# Patient Record
Sex: FEMALE | Race: BLACK OR AFRICAN AMERICAN | Employment: FULL TIME | URBAN - METROPOLITAN AREA
[De-identification: names, ages, dates, MRNs, and addresses within clinical notes are randomized per-mention and may not be internally consistent; named-entity substitution may affect disease eponyms.]

---

## 2017-01-16 ENCOUNTER — OFFICE VISIT (OUTPATIENT)
Dept: FAMILY MEDICINE CLINIC | Age: 32
End: 2017-01-16

## 2017-01-16 VITALS
SYSTOLIC BLOOD PRESSURE: 110 MMHG | BODY MASS INDEX: 30.65 KG/M2 | RESPIRATION RATE: 16 BRPM | DIASTOLIC BLOOD PRESSURE: 69 MMHG | WEIGHT: 202.2 LBS | HEART RATE: 86 BPM | OXYGEN SATURATION: 100 % | TEMPERATURE: 97.6 F | HEIGHT: 68 IN

## 2017-01-16 DIAGNOSIS — F41.8 DEPRESSION WITH ANXIETY: Primary | ICD-10-CM

## 2017-01-16 RX ORDER — VENLAFAXINE HYDROCHLORIDE 37.5 MG/1
37.5 CAPSULE, EXTENDED RELEASE ORAL DAILY
Qty: 30 CAP | Refills: 1 | Status: SHIPPED | OUTPATIENT
Start: 2017-01-16 | End: 2017-02-14 | Stop reason: SDUPTHER

## 2017-01-16 RX ORDER — ESCITALOPRAM OXALATE 10 MG/1
10 TABLET ORAL DAILY
COMMUNITY
Start: 2016-12-06 | End: 2017-01-16 | Stop reason: ALTCHOICE

## 2017-01-16 RX ORDER — AZITHROMYCIN 250 MG/1
TABLET, FILM COATED ORAL
COMMUNITY
Start: 2016-12-06 | End: 2017-01-16 | Stop reason: ALTCHOICE

## 2017-01-16 NOTE — MR AVS SNAPSHOT
Visit Information Date & Time Provider Department Dept. Phone Encounter #  
 1/16/2017  2:55 PM Dai Osullivan NP [facility] [phone] [id number] Follow-up Instructions Return in about 4 weeks (around 2/13/2017) for med f/u. Upcoming Health Maintenance Date Due  
 PAP AKA CERVICAL CYTOLOGY 2/2/2018 DTaP/Tdap/Td series (2 - Td) 1/16/2027 Allergies as of 1/16/2017  Review Complete On: 1/16/2017 By: Dai Osullivan NP No Known Allergies Current Immunizations  Never Reviewed No immunizations on file. Not reviewed this visit You Were Diagnosed With   
  
 Codes Comments Depression with anxiety    -  Primary ICD-10-CM: F41.8 ICD-9-CM: 300.4 Vitals BP Pulse Temp Resp Height(growth percentile) Weight(growth percentile) 110/69 (BP 1 Location: Right arm, BP Patient Position: Sitting) 86 97.6 °F (36.4 °C) (Oral) 16 5' 7.5\" (1.715 m) 202 lb 3.2 oz (91.7 kg) LMP SpO2 BMI Smoking Status 01/03/2017 (Approximate) 100% 31.2 kg/m2 Never Smoker BMI and BSA Data Body Mass Index Body Surface Area  
 31.2 kg/m 2 2.09 m 2 Preferred Pharmacy Pharmacy Name Phone [facility] [address] [place] [address] [phone] Your Updated Medication List  
  
   
This list is accurate as of: 1/16/17  3:43 PM.  Always use your most recent med list.  
  
  
  
  
 venlafaxine-SR 37.5 mg capsule Commonly known as:  EFFEXOR-XR Take 1 Cap by mouth daily. Prescriptions Sent to Pharmacy Refills  
 venlafaxine-SR (EFFEXOR-XR) 37.5 mg capsule 1 Sig: Take 1 Cap by mouth daily. Class: Normal  
 Pharmacy: [facility] [address] [place] [address] Ph #: [phone] Route: Oral  
  
We Performed the Following REFERRAL TO PSYCHOLOGY [JMX13 Custom] Comments:  
 Please evaluate patient for depression/anxiety counseling.  Please refer to [facility]. Follow-up Instructions Return in about 4 weeks (around 2/13/2017) for med f/u. Referral Information Referral ID Referred By Referred To  
  
 [id number] [facility] Not Available Visits Status Start Date End Date 1 New Request 1/16/17 1/16/18 If your referral has a status of pending review or denied, additional information will be sent to support the outcome of this decision. Patient Instructions Recovering From Depression: Care Instructions Your Care Instructions Taking good care of yourself is important as you recover from depression. In time, your symptoms will fade as your treatment takes hold. Do not give up. Instead, focus your energy on getting better. Your mood will improve. It just takes some time. Focus on things that can help you feel better, such as being with friends and family, eating well, and getting enough rest. But take things slowly. Do not do too much too soon. You will begin to feel better gradually. Follow-up care is a key part of your treatment and safety. Be sure to make and go to all appointments, and call your doctor if you are having problems. It's also a good idea to know your test results and keep a list of the medicines you take. How can you care for yourself at home? Be realistic · If you have a large task to do, break it up into smaller steps you can handle, and just do what you can. · You may want to put off important decisions until your depression has lifted. If you have plans that will have a major impact on your life, such as marriage, divorce, or a job change, try to wait a bit. Talk it over with friends and loved ones who can help you look at the overall picture first. 
· Reaching out to people for help is important. Do not isolate yourself. Let your family and friends help you. Find someone you can trust and confide in, and talk to that person. · Be patient, and be kind to yourself. Remember that depression is not your fault and is not something you can overcome with willpower alone. Treatment is necessary for depression, just like for any other illness. Feeling better takes time, and your mood will improve little by little. Stay active · Stay busy and get outside. Take a walk, or try some other light exercise. · Talk with your doctor about an exercise program. Exercise can help with mild depression. · Go to a movie or concert. Take part in a Taoism activity or other social gathering. Go to a [employer] game. · Ask a friend to have dinner with you. Take care of yourself · Eat a balanced diet with plenty of fresh fruits and vegetables, whole grains, and lean protein. If you have lost your appetite, eat small snacks rather than large meals. · Avoid drinking alcohol or using illegal drugs. Do not take medicines that have not been prescribed for you. They may interfere with medicines you may be taking for depression, or they may make your depression worse. · Take your medicines exactly as they are prescribed. You may start to feel better within 1 to 3 weeks of taking antidepressant medicine. But it can take as many as 6 to 8 weeks to see more improvement. If you have questions or concerns about your medicines, or if you do not notice any improvement by 3 weeks, talk to your doctor. · If you have any side effects from your medicine, tell your doctor. Antidepressants can make you feel tired, dizzy, or nervous. Some people have dry mouth, constipation, headaches, sexual problems, or diarrhea. Many of these side effects are mild and will go away on their own after you have been taking the medicine for a few weeks. Some may last longer. Talk to your doctor if side effects are bothering you too much. You might be able to try a different medicine. · Get enough sleep. If you have problems sleeping: ¨ Go to bed at the same time every night, and get up at the same time every morning. ¨ Keep your bedroom dark and quiet. ¨ Do not exercise after 5:00 p.m. ¨ Avoid drinks with caffeine after 5:00 p.m. · Avoid sleeping pills unless they are prescribed by the doctor treating your depression. Sleeping pills may make you groggy during the day, and they may interact with other medicine you are taking. · If you have any other illnesses, such as diabetes, heart disease, or high blood pressure, make sure to continue with your treatment. Tell your doctor about all of the medicines you take, including those with or without a prescription. · Keep the numbers for these national suicide hotlines: 0-142-800-TALK (0-874.543.3316) and 1-319-JAETWTT (7-278.418.6580). If you or someone you know talks about suicide or feeling hopeless, get help right away. When should you call for help? Call 911 anytime you think you may need emergency care. For example, call if: 
· You feel like hurting yourself or someone else. · Someone you know has depression and is about to attempt or is attempting suicide. Call your doctor now or seek immediate medical care if: 
· You hear voices. · Someone you know has depression and: 
¨ Starts to give away his or her possessions. ¨ Uses illegal drugs or drinks alcohol heavily. ¨ Talks or writes about death, including writing suicide notes or talking about guns, knives, or pills. ¨ Starts to spend a lot of time alone. ¨ Acts very aggressively or suddenly appears calm. Watch closely for changes in your health, and be sure to contact your doctor if: 
· You do not get better as expected. Where can you learn more? Go to http://mitch-radha.info/. Enter F228 in the search box to learn more about \"Recovering From Depression: Care Instructions. \" Current as of: July 26, 2016 Content Version: 11.1 © 7993-8196 [employer].  Care instructions adapted under license by [address] (which disclaims liability or warranty for this information). If you have questions about a medical condition or this instruction, always ask your healthcare professional. [address] any warranty or liability for your use of this information. Introducing \[facility]\"" & HEALTH SERVICES! Raheem Harrell introduces Antengo patient portal. Now you can access parts of your medical record, email your doctor's office, and request medication refills online. 1. In your internet browser, go to [email or web address] 2. Click on the First Time User? Click Here link in the Sign In box. You will see the New Member Sign Up page. 3. Enter your Antengo Access Code exactly as it appears below. You will not need to use this code after youve completed the sign-up process. If you do not sign up before the expiration date, you must request a new code. · Antengo Access Code: [email or web address] Expires: 4/16/2017  3:11 PM 
 
4. Enter the last four digits of your Social Security Number (xxxx) and Date of Birth (mm/dd/yyyy) as indicated and click Submit. You will be taken to the next sign-up page. 5. Create a Antengo ID. This will be your Antengo login ID and cannot be changed, so think of one that is secure and easy to remember. 6. Create a Antengo password. You can change your password at any time. 7. Enter your Password Reset Question and Answer. This can be used at a later time if you forget your password. 8. Enter your e-mail address. You will receive e-mail notification when new information is available in [address] 9. Click Sign Up. You can now view and download portions of your medical record. 10. Click the Download Summary menu link to download a portable copy of your medical information. If you have questions, please visit the Frequently Asked Questions section of the Antengo website.  Remember, Antengo is NOT to be used for urgent needs. For medical emergencies, dial 911. Now available from your iPhone and Android! Please provide this summary of care documentation to your next provider. Your primary care clinician is listed as Herrera Arita. If you have any questions after today's visit, please call [phone].

## 2017-01-16 NOTE — PROGRESS NOTES
HISTORY OF PRESENT ILLNESS  Rigoberto Varela is a 33 y.o. female. HPI  The patient presents today to establish care. Previous PCP was n/a. She is a  at [employer]. Medical Problems:  1. Denies    Current Specialists:  1. Guadalupe Mosher - OB/GYN at [facility] (at [facility])    Acute complaints today:  1. Anxiety/Depression - Thinks that her symptoms have gotten worse over the past year. Has had symptoms since her kids were born; she split with her  shortly after that. She moved, changed job, her dad is legally blind and has moved in. Her stepkids also come frequently. Work is also very stressful, as it has grown a lot. She has financial stressorss as well. She feels very overwhelmed. She is sleeping a lot. Has a short temper  Takes lexapro 10mg once daily, started by her OB/GYN March 2016. Feels like it was helping somewhat, but not enough. Some days are better than others. Preventative Care  Flu shot: already got this season  TDaP: declines  Pap smear: February 2015 with OB/GYN; normal  Denies any family hx of breast or colon CA. Healthy Habits  Patient is exercising 0x per week. Patient endorses unhealthy diet; avoids fatty/greasy foods, sugar-sweetened beverages. Eats a lot of take-out foot at work. Denies tobacco use. Social alcohol use. Denies illicit drug use. Sexually active with 1 male partner in last 12 months.; no concern for STIs today. History reviewed. No pertinent past medical history.   Past Surgical History   Procedure Laterality Date    Hx gyn  2011     Family History   Problem Relation Age of Onset    Hypertension Mother    [facility] Anxiety Mother     Neuropathy Mother     Elevated Lipids Mother     COPD Father    [facility] Glaucoma Father    [facility] Depression Father     Hypertension Sister     No Known Problems Sister     Lupus Sister     Thyroid Disease Sister      Social History     Social History    Marital status: SINGLE     Spouse name: N/A    Number of children: N/A    Years of education: N/A     Social History Main Topics    Smoking status: Never Smoker    Smokeless tobacco: Never Used    Alcohol use Yes      Comment: occasional    Drug use: No    Sexual activity: Yes     Partners: Male     Other Topics Concern    None     Social History Narrative    None     Patient Active Problem List   Diagnosis Code    Depression with anxiety F41.8     Outpatient Encounter Prescriptions as of 1/16/2017   Medication Sig Dispense Refill    venlafaxine-SR (EFFEXOR-XR) 37.5 mg capsule Take 1 Cap by mouth daily. 30 Cap 1    [DISCONTINUED] azithromycin (ZITHROMAX) 250 mg tablet       [DISCONTINUED] escitalopram oxalate (LEXAPRO) 10 mg tablet 10 mg daily. No facility-administered encounter medications on file as of 1/16/2017. Visit Vitals    /69 (BP 1 Location: Right arm, BP Patient Position: Sitting)    Pulse 86    Temp 97.6 °F (36.4 °C) (Oral)    Resp 16    Ht 5' 7.5\" (1.715 m)    Wt 202 lb 3.2 oz (91.7 kg)    LMP 01/03/2017 (Approximate)    SpO2 100%    BMI 31.2 kg/m2         Review of Systems   Constitutional: Negative for chills and fever. Respiratory: Negative for cough and shortness of breath. Cardiovascular: Negative for chest pain and palpitations. Psychiatric/Behavioral: Positive for depression. Negative for hallucinations, substance abuse and suicidal ideas. The patient is nervous/anxious. The patient does not have insomnia. Physical Exam   Constitutional: She is oriented to person, place, and time. She appears well-developed and well-nourished. No distress. Visibly anxious; tearful   Cardiovascular: Normal rate, normal heart sounds and intact distal pulses. Pulmonary/Chest: Effort normal and breath sounds normal. No respiratory distress. She has no wheezes. Neurological: She is alert and oriented to person, place, and time. Skin: Skin is warm and dry. She is not diaphoretic. Psychiatric: She has a normal mood and affect. Her behavior is normal. Judgment normal.   Nursing note and vitals reviewed. ASSESSMENT and PLAN    ICD-10-CM ICD-9-CM    1. Depression with anxiety F41.8 300.4 venlafaxine-SR (EFFEXOR-XR) 37.5 mg capsule      REFERRAL TO PSYCHOLOGY     1. Establish care  The patient's medications, allergies, and history were all updated today. 2. Depression with Anxiety  PHQ-9 score of 16 today indicates moderate depression; reinforced with history today. Discussed at length. Will switch from lexapro to effexor 37.5mg. Will also refer to counseling. Also discussed importance of social support, exercise, and having a feeling of purpose in management of depression/anxiety. Discussed possible SEs of new medication, and to seek care immediately if symptoms worsen or if SI develops. Return in 4 weeks for medication follow-up. Will plan for labs and a physical after depression/anxiety is better controlled. Follow-up Disposition:  Return in about 4 weeks (around 2/13/2017) for med f/u.

## 2017-01-16 NOTE — PATIENT INSTRUCTIONS
Recovering From Depression: Care Instructions  Your Care Instructions  Taking good care of yourself is important as you recover from depression. In time, your symptoms will fade as your treatment takes hold. Do not give up. Instead, focus your energy on getting better. Your mood will improve. It just takes some time. Focus on things that can help you feel better, such as being with friends and family, eating well, and getting enough rest. But take things slowly. Do not do too much too soon. You will begin to feel better gradually. Follow-up care is a key part of your treatment and safety. Be sure to make and go to all appointments, and call your doctor if you are having problems. It's also a good idea to know your test results and keep a list of the medicines you take. How can you care for yourself at home? Be realistic  · If you have a large task to do, break it up into smaller steps you can handle, and just do what you can. · You may want to put off important decisions until your depression has lifted. If you have plans that will have a major impact on your life, such as marriage, divorce, or a job change, try to wait a bit. Talk it over with friends and loved ones who can help you look at the overall picture first.  · Reaching out to people for help is important. Do not isolate yourself. Let your family and friends help you. Find someone you can trust and confide in, and talk to that person. · Be patient, and be kind to yourself. Remember that depression is not your fault and is not something you can overcome with willpower alone. Treatment is necessary for depression, just like for any other illness. Feeling better takes time, and your mood will improve little by little. Stay active  · Stay busy and get outside. Take a walk, or try some other light exercise. · Talk with your doctor about an exercise program. Exercise can help with mild depression. · Go to a movie or concert.  Take part in a Religious activity or other social gathering. Go to a [employer] game. · Ask a friend to have dinner with you. Take care of yourself  · Eat a balanced diet with plenty of fresh fruits and vegetables, whole grains, and lean protein. If you have lost your appetite, eat small snacks rather than large meals. · Avoid drinking alcohol or using illegal drugs. Do not take medicines that have not been prescribed for you. They may interfere with medicines you may be taking for depression, or they may make your depression worse. · Take your medicines exactly as they are prescribed. You may start to feel better within 1 to 3 weeks of taking antidepressant medicine. But it can take as many as 6 to 8 weeks to see more improvement. If you have questions or concerns about your medicines, or if you do not notice any improvement by 3 weeks, talk to your doctor. · If you have any side effects from your medicine, tell your doctor. Antidepressants can make you feel tired, dizzy, or nervous. Some people have dry mouth, constipation, headaches, sexual problems, or diarrhea. Many of these side effects are mild and will go away on their own after you have been taking the medicine for a few weeks. Some may last longer. Talk to your doctor if side effects are bothering you too much. You might be able to try a different medicine. · Get enough sleep. If you have problems sleeping:  ¨ Go to bed at the same time every night, and get up at the same time every morning. ¨ Keep your bedroom dark and quiet. ¨ Do not exercise after 5:00 p.m. ¨ Avoid drinks with caffeine after 5:00 p.m. · Avoid sleeping pills unless they are prescribed by the doctor treating your depression. Sleeping pills may make you groggy during the day, and they may interact with other medicine you are taking. · If you have any other illnesses, such as diabetes, heart disease, or high blood pressure, make sure to continue with your treatment.  Tell your doctor about all of the medicines you take, including those with or without a prescription. · Keep the numbers for these national suicide hotlines: 5-125-592-TALK (0-497.664.8422) and 4-307-YDPQZCX (0-239.224.5999). If you or someone you know talks about suicide or feeling hopeless, get help right away. When should you call for help? Call 911 anytime you think you may need emergency care. For example, call if:  · You feel like hurting yourself or someone else. · Someone you know has depression and is about to attempt or is attempting suicide. Call your doctor now or seek immediate medical care if:  · You hear voices. · Someone you know has depression and:  ¨ Starts to give away his or her possessions. ¨ Uses illegal drugs or drinks alcohol heavily. ¨ Talks or writes about death, including writing suicide notes or talking about guns, knives, or pills. ¨ Starts to spend a lot of time alone. ¨ Acts very aggressively or suddenly appears calm. Watch closely for changes in your health, and be sure to contact your doctor if:  · You do not get better as expected. Where can you learn more? Go to http://mitch-radha.info/. Enter Z146 in the search box to learn more about \"Recovering From Depression: Care Instructions. \"  Current as of: July 26, 2016  Content Version: 11.1  © 2237-8789 [employer]. Care instructions adapted under license by [employer] (which disclaims liability or warranty for this information). If you have questions about a medical condition or this instruction, always ask your healthcare professional. [place] [address] any warranty or liability for your use of this information.

## 2017-01-16 NOTE — PROGRESS NOTES
Chief Complaint   Patient presents with    Establish Care     anxiety and depression     Patient here to establish care. Previous PCP: none  at  n/a  Patient sees the following specialist Dr. Benjamin De La Fuente (OB/GYN)    Release of Medical Information signed by patient today: yes   Patient Concerns: Depression and anxiety  Patient concerned about constant movement possible related to anxiety. Samira Fields

## 2017-02-14 ENCOUNTER — OFFICE VISIT (OUTPATIENT)
Dept: FAMILY MEDICINE CLINIC | Age: 32
End: 2017-02-14

## 2017-02-14 VITALS
TEMPERATURE: 97.2 F | HEIGHT: 67 IN | WEIGHT: 202.4 LBS | RESPIRATION RATE: 16 BRPM | OXYGEN SATURATION: 99 % | DIASTOLIC BLOOD PRESSURE: 65 MMHG | BODY MASS INDEX: 31.77 KG/M2 | SYSTOLIC BLOOD PRESSURE: 127 MMHG | HEART RATE: 77 BPM

## 2017-02-14 DIAGNOSIS — Z00.00 PHYSICAL EXAM, ANNUAL: ICD-10-CM

## 2017-02-14 DIAGNOSIS — E66.9 OBESITY (BMI 30-39.9): ICD-10-CM

## 2017-02-14 DIAGNOSIS — F41.8 DEPRESSION WITH ANXIETY: Primary | ICD-10-CM

## 2017-02-14 RX ORDER — VENLAFAXINE HYDROCHLORIDE 37.5 MG/1
37.5 CAPSULE, EXTENDED RELEASE ORAL DAILY
Qty: 30 CAP | Refills: 0 | Status: SHIPPED | OUTPATIENT
Start: 2017-02-14 | End: 2017-04-04 | Stop reason: SDUPTHER

## 2017-02-14 NOTE — PROGRESS NOTES
HISTORY OF PRESENT ILLNESS  Tameka Mueller is a 33 y.o. female. HPI    Here for follow-up on her anxiety/depression. Was started on effexor 37.5mg daily at her last visit, changed from lexapro. PHQ-9 score was 16 at her last visit, PHQ-9 score 11 today. Is having some headaches off-and-on since starting the medication; not unbearable. Denies photophobia, weakness, \"worst headache of my life\". Symptoms resolve with taking BC powder, but headaches are happening nearly everyday. Takes the medication with a banana or an apple, doesn't drink much water during the day but instead lots of coffee. Has not started counseling yet, as she'd like to get the whole family involved in counseling. States that she is feeling much better on the new medication; not sure if she'd like to increase the dose at this point or not. Recently joined the gym and plans to start exercising regularly. History reviewed. No pertinent past medical history.   Past Surgical History   Procedure Laterality Date    Hx gyn  2011     Family History   Problem Relation Age of Onset    Hypertension Mother    Bettyjo Haus Anxiety Mother     Neuropathy Mother     Elevated Lipids Mother     COPD Father    Bettyjo Haus Glaucoma Father    Bettyjo Haus Depression Father     Hypertension Sister     No Known Problems Sister     Lupus Sister     Thyroid Disease Sister      Social History     Social History    Marital status: SINGLE     Spouse name: N/A    Number of children: N/A    Years of education: N/A     Social History Main Topics    Smoking status: Never Smoker    Smokeless tobacco: Never Used    Alcohol use Yes      Comment: occasional    Drug use: No    Sexual activity: Yes     Partners: Male     Other Topics Concern    None     Social History Narrative     Patient Active Problem List   Diagnosis Code    Depression with anxiety F41.8     Outpatient Encounter Prescriptions as of 2/14/2017   Medication Sig Dispense Refill    venlafaxine-SR (EFFEXOR-XR) 37.5 mg capsule Take 1 Cap by mouth daily. 30 Cap 0    [DISCONTINUED] venlafaxine-SR (EFFEXOR-XR) 37.5 mg capsule Take 1 Cap by mouth daily. 30 Cap 1     No facility-administered encounter medications on file as of 2/14/2017. Visit Vitals    /65 (BP 1 Location: Right arm, BP Patient Position: Sitting)    Pulse 77    Temp 97.2 °F (36.2 °C) (Oral)    Resp 16    Ht 5' 7\" (1.702 m)    Wt 202 lb 6.4 oz (91.8 kg)    LMP 01/03/2017 (Approximate)    SpO2 99%    BMI 31.7 kg/m2         Review of Systems   HENT: Negative for hearing loss. Cardiovascular: Negative for chest pain. Neurological: Positive for headaches. Negative for dizziness. Psychiatric/Behavioral: Positive for depression. Negative for substance abuse and suicidal ideas. The patient is nervous/anxious. The patient does not have insomnia. Physical Exam   Constitutional: She is oriented to person, place, and time. She appears well-developed and well-nourished. No distress. Cardiovascular: Normal rate, regular rhythm and normal heart sounds. Pulmonary/Chest: Effort normal and breath sounds normal. No respiratory distress. Neurological: She is alert and oriented to person, place, and time. Skin: Skin is warm and dry. She is not diaphoretic. Psychiatric: She has a normal mood and affect. Her behavior is normal. Judgment normal.   Nursing note and vitals reviewed. ASSESSMENT and PLAN    ICD-10-CM ICD-9-CM    1. Depression with anxiety F41.8 300.4 venlafaxine-SR (EFFEXOR-XR) 37.5 mg capsule   2. Physical exam, annual Z00.00 V70.0 CBC WITH AUTOMATED DIFF      METABOLIC PANEL, COMPREHENSIVE      TSH 3RD GENERATION      VITAMIN D, 25 HYDROXY      URINALYSIS W/ RFLX MICROSCOPIC      LIPID PANEL AND CHOL/HDL RATIO     1. Depression with anxiety  Symptoms definitely improved on effexor. Discussed increasing dose today, but patient would like to wait for now.  Recommended taking the medication with more food (especially protein) and increasing intake of water to help mitigate headache symptoms. If symptoms do not improve, will likely have to switch medication. Encouraged her to continue     Follow-up Disposition:  Return in about 1 month (around 3/14/2017) for fasting labs and CPE/lab review 1 week later.

## 2017-02-14 NOTE — PATIENT INSTRUCTIONS

## 2017-02-14 NOTE — MR AVS SNAPSHOT
Visit Information Date & Time Provider Department Dept. Phone Encounter #  
 2/14/2017  2:55 PM Nixon Wong NP [facility] [phone] [id number] Follow-up Instructions Return in about 1 month (around 3/14/2017) for fasting labs and CPE/lab review 1 week later. Upcoming Health Maintenance Date Due  
 PAP AKA CERVICAL CYTOLOGY 2/2/2018 DTaP/Tdap/Td series (2 - Td) 1/16/2027 Allergies as of 2/14/2017  Review Complete On: 2/14/2017 By: Nixon Wong NP No Known Allergies Current Immunizations  Never Reviewed No immunizations on file. Not reviewed this visit You Were Diagnosed With   
  
 Codes Comments Depression with anxiety    -  Primary ICD-10-CM: F41.8 ICD-9-CM: 300.4 Physical exam, annual     ICD-10-CM: Z00.00 ICD-9-CM: V70.0 Vitals BP Pulse Temp Resp Height(growth percentile) Weight(growth percentile) 127/65 (BP 1 Location: Right arm, BP Patient Position: Sitting) 77 97.2 °F (36.2 °C) (Oral) 16 5' 7\" (1.702 m) 202 lb 6.4 oz (91.8 kg) LMP SpO2 BMI Smoking Status 01/03/2017 (Approximate) 99% 31.7 kg/m2 Never Smoker BMI and BSA Data Body Mass Index Body Surface Area 31.7 kg/m 2 2.08 m 2 Preferred Pharmacy [facility] Name Phone [facility] [address] [place] [address] [phone] Your Updated Medication List  
  
   
This list is accurate as of: 2/14/17  3:29 PM.  Always use your most recent med list.  
  
  
  
  
 venlafaxine-SR 37.5 mg capsule Commonly known as:  EFFEXOR-XR Take 1 Cap by mouth daily. Prescriptions Sent to Pharmacy Refills  
 venlafaxine-SR (EFFEXOR-XR) 37.5 mg capsule 0 Sig: Take 1 Cap by mouth daily. Class: Normal  
 Pharmacy: [facility] [address] [place] [address], [address] [place] [address] #: [phone] Route: Oral  
  
Follow-up Instructions Return in about 1 month (around 3/14/2017) for fasting labs and CPE/lab review 1 week later. To-Do List   
 02/14/2017 Lab:  CBC WITH AUTOMATED DIFF   
  
 02/14/2017 Lab:  LIPID PANEL AND CHOL/HDL RATIO   
  
 02/14/2017 Lab:  METABOLIC PANEL, COMPREHENSIVE   
  
 02/14/2017 Lab:  TSH 3RD GENERATION   
  
 02/14/2017 Lab:  URINALYSIS W/ RFLX MICROSCOPIC   
  
 02/14/2017 Lab:  VITAMIN D, 25 HYDROXY Patient Instructions Anxiety Disorder: Care Instructions Your Care Instructions Anxiety is a normal reaction to stress. Difficult situations can cause you to have symptoms such as sweaty palms and a nervous feeling. In an anxiety disorder, the symptoms are far more severe. Constant worry, muscle tension, trouble sleeping, nausea and diarrhea, and other symptoms can make normal daily activities difficult or impossible. These symptoms may occur for no reason, and they can affect your work, school, or social life. Medicines, counseling, and self-care can all help. Follow-up care is a key part of your treatment and safety. Be sure to make and go to all appointments, and call your doctor if you are having problems. It's also a good idea to know your test results and keep a list of the medicines you take. How can you care for yourself at home? · Take medicines exactly as directed. Call your doctor if you think you are having a problem with your medicine. · Go to your counseling sessions and follow-up appointments. · Recognize and accept your anxiety. Then, when you are in a situation that makes you anxious, say to yourself, \"This is not an emergency. I feel uncomfortable, but I am not in danger. I can keep going even if I feel anxious. \" · Be kind to your body: ¨ Relieve tension with exercise or a massage. ¨ Get enough rest. 
¨ Avoid alcohol, caffeine, nicotine, and illegal drugs. They can increase your anxiety level and cause sleep problems. ¨ Learn and do relaxation techniques. See below for more about these techniques. · Engage your mind. Get out and do something you enjoy. Go to a funny movie, or take a walk or hike. Plan your day. Having too much or too little to do can make you anxious. · Keep a record of your symptoms. Discuss your fears with a good friend or family member, or join a support group for people with similar problems. Talking to others sometimes relieves stress. · Get involved in social groups, or volunteer to help others. Being alone sometimes makes things seem worse than they are. · Get at least 30 minutes of exercise on most days of the week to relieve stress. Walking is a good choice. You also may want to do other activities, such as running, swimming, cycling, or playing tennis or team sports. Relaxation techniques Do relaxation exercises 10 to 20 minutes a day. You can play soothing, relaxing music while you do them, if you wish. · Tell others in your house that you are going to do your relaxation exercises. Ask them not to disturb you. · Find a comfortable place, away from all distractions and noise. · Lie down on your back, or sit with your back straight. · Focus on your breathing. Make it slow and steady. · Breathe in through your nose. Breathe out through either your nose or mouth. · Breathe deeply, filling up the area between your navel and your rib cage. Breathe so that your belly goes up and down. · Do not hold your breath. · Breathe like this for 5 to 10 minutes. Notice the feeling of calmness throughout your whole body. As you continue to breathe slowly and deeply, relax by doing the following for another 5 to 10 minutes: · Tighten and relax each muscle group in your body. You can begin at your toes and work your way up to your head. · Imagine your muscle groups relaxing and becoming heavy. · Empty your mind of all thoughts. · Let yourself relax more and more deeply. · Become aware of the state of calmness that surrounds you. · When your relaxation time is over, you can bring yourself back to alertness by moving your fingers and toes and then your hands and feet and then stretching and moving your entire body. Sometimes people fall asleep during relaxation, but they usually wake up shortly afterward. · Always give yourself time to return to full alertness before you drive a car or do anything that might cause an accident if you are not fully alert. Never play a relaxation tape while you drive a car. When should you call for help? Call 911 anytime you think you may need emergency care. For example, call if: 
· You feel you cannot stop from hurting yourself or someone else. Keep the numbers for these national suicide hotlines: 7-345-103-TALK (1-979.213.8755) and 7-475-AFFFYOQ (7-199.759.9437). If you or someone you know talks about suicide or feeling hopeless, get help right away. Watch closely for changes in your health, and be sure to contact your doctor if: 
· You have anxiety or fear that affects your life. · You have symptoms of anxiety that are new or different from those you had before. Where can you learn more? Go to http://mitch-radha.info/. Enter P754 in the search box to learn more about \"Anxiety Disorder: Care Instructions. \" Current as of: July 26, 2016 Content Version: 11.1 © 0614-0105 [employer]. Care instructions adapted under license by [employer] (which disclaims liability or warranty for this information). If you have questions about a medical condition or this instruction, always ask your healthcare professional. [place] [address] any warranty or liability for your use of this information. Introducing hospitals & HEALTH SERVICES! Cheri Paul introduces [employer] patient portal. Now you can access parts of your medical record, email your doctor's office, and request medication refills online. 1. In your internet browser, go to [email or web address] 2. Click on the First Time User? Click Here link in the Sign In box. You will see the New Member Sign Up page. 3. Enter your Shoptiques Access Code exactly as it appears below. You will not need to use this code after youve completed the sign-up process. If you do not sign up before the expiration date, you must request a new code. · Shoptiques Access Code: [email or web address] Expires: 4/16/2017  3:11 PM 
 
4. Enter the last four digits of your Social Security Number (xxxx) and Date of Birth (mm/dd/yyyy) as indicated and click Submit. You will be taken to the next sign-up page. 5. Create a BettrLifet ID. This will be your Shoptiques login ID and cannot be changed, so think of one that is secure and easy to remember. 6. Create a Shoptiques password. You can change your password at any time. 7. Enter your Password Reset Question and Answer. This can be used at a later time if you forget your password. 8. Enter your e-mail address. You will receive e-mail notification when new information is available in [address] 9. Click Sign Up. You can now view and download portions of your medical record. 10. Click the Download Summary menu link to download a portable copy of your medical information. If you have questions, please visit the Frequently Asked Questions section of the Shoptiques website. Remember, Shoptiques is NOT to be used for urgent needs. For medical emergencies, dial 911. Now available from your iPhone and Android! Please provide this summary of care documentation to your next provider. Your primary care clinician is listed as Wil Dawson. If you have any questions after today's visit, please call [phone].

## 2017-02-14 NOTE — PROGRESS NOTES
Chief Complaint   Patient presents with    Medication Management     Patient states headaches and low energy level. Works at [email or web address].

## 2017-02-23 ENCOUNTER — DOCUMENTATION ONLY (OUTPATIENT)
Dept: FAMILY MEDICINE CLINIC | Age: 32
End: 2017-02-23

## 2017-02-23 NOTE — PROGRESS NOTES
Records recevied from Dr. Reyes Flaming, OB/GYN. Normal thyroid and vit D labs drawn May 2016. Vit B12 high (previous note recommends starting vit B12 OTC). Low Hgb (consistent with SAYDA) February 2016. Normal pap, negative HPV February 3, 2016. Will send records for scanning.

## 2017-03-07 ENCOUNTER — DOCUMENTATION ONLY (OUTPATIENT)
Dept: FAMILY MEDICINE CLINIC | Age: 32
End: 2017-03-07

## 2017-03-07 NOTE — PROGRESS NOTES
Patient contacted office today to verify if able to go to [facility] Draw station. Lab orders verified in patient's chart as to be done. Patient instructed by psr to fast and drink plenty of water.

## 2017-03-15 ENCOUNTER — TELEPHONE (OUTPATIENT)
Dept: FAMILY MEDICINE CLINIC | Age: 32
End: 2017-03-15

## 2017-03-15 NOTE — TELEPHONE ENCOUNTER
Chago calling from [facility] [phone] is requesting the paper work to be fax to there office

## 2017-03-15 NOTE — TELEPHONE ENCOUNTER
Spoke with Jenni Hess at [employer] to send a fax copy to [employer] at [phone] for patient's orders. Jenni Hess states she does not have access to [address] [place] [address] and orders.   Patient drawn this morning per Jenni Hess at [facility].

## 2017-03-20 ENCOUNTER — TELEPHONE (OUTPATIENT)
Dept: FAMILY MEDICINE CLINIC | Age: 32
End: 2017-03-20

## 2017-03-20 NOTE — TELEPHONE ENCOUNTER
Message left on patient voice mail [phone] asking for return call. Patient has not had labwork drawn per instructions.

## 2017-03-22 ENCOUNTER — TELEPHONE (OUTPATIENT)
Dept: FAMILY MEDICINE CLINIC | Age: 32
End: 2017-03-22

## 2017-03-22 NOTE — TELEPHONE ENCOUNTER
----- Message from Rach Bates sent at 3/22/2017  7:19 AM EDT -----  Regarding: Dr. Cam Del Rosario  Contact: [phone]  Patient stated she went to [facility] this morning to have lab work done and the order was not there, Patient is requesting to have order fax to her attention @ [phone]), Best contact number for patient is [phone].

## 2017-03-22 NOTE — TELEPHONE ENCOUNTER
Per patient request lab orders faxed to patient at [phone]. Phone number [phone] called. Unable leave message as mail box was full per voice mail.

## 2017-03-25 LAB
25(OH)D3+25(OH)D2 SERPL-MCNC: 29.9 NG/ML (ref 30–100)
ALBUMIN SERPL-MCNC: 4.2 G/DL (ref 3.5–5.5)
ALBUMIN/GLOB SERPL: 1.2 {RATIO} (ref 1.2–2.2)
ALP SERPL-CCNC: 67 IU/L (ref 39–117)
ALT SERPL-CCNC: 19 IU/L (ref 0–32)
APPEARANCE UR: CLEAR
AST SERPL-CCNC: 17 IU/L (ref 0–40)
BASOPHILS # BLD AUTO: 0 X10E3/UL (ref 0–0.2)
BASOPHILS NFR BLD AUTO: 1 %
BILIRUB SERPL-MCNC: 0.2 MG/DL (ref 0–1.2)
BILIRUB UR QL STRIP: NEGATIVE
BUN SERPL-MCNC: 11 MG/DL (ref 6–20)
BUN/CREAT SERPL: 15 (ref 8–20)
CALCIUM SERPL-MCNC: 8.9 MG/DL (ref 8.7–10.2)
CHLORIDE SERPL-SCNC: 96 MMOL/L (ref 96–106)
CHOLEST SERPL-MCNC: 149 MG/DL (ref 100–199)
CHOLEST/HDLC SERPL: 2.4 RATIO UNITS (ref 0–4.4)
CO2 SERPL-SCNC: 25 MMOL/L (ref 18–29)
COLOR UR: YELLOW
CREAT SERPL-MCNC: 0.71 MG/DL (ref 0.57–1)
EOSINOPHIL # BLD AUTO: 0.1 X10E3/UL (ref 0–0.4)
EOSINOPHIL NFR BLD AUTO: 2 %
ERYTHROCYTE [DISTWIDTH] IN BLOOD BY AUTOMATED COUNT: 14.9 % (ref 12.3–15.4)
EST. AVERAGE GLUCOSE BLD GHB EST-MCNC: 117 MG/DL
GLOBULIN SER CALC-MCNC: 3.4 G/DL (ref 1.5–4.5)
GLUCOSE SERPL-MCNC: 88 MG/DL (ref 65–99)
GLUCOSE UR QL: NEGATIVE
HBA1C MFR BLD: 5.7 % (ref 4.8–5.6)
HCT VFR BLD AUTO: 35.9 % (ref 34–46.6)
HDLC SERPL-MCNC: 61 MG/DL
HGB BLD-MCNC: 10.6 G/DL (ref 11.1–15.9)
HGB UR QL STRIP: NEGATIVE
IMM GRANULOCYTES # BLD: 0 X10E3/UL (ref 0–0.1)
IMM GRANULOCYTES NFR BLD: 0 %
INTERPRETATION, 910389: NORMAL
KETONES UR QL STRIP: NEGATIVE
LDLC SERPL CALC-MCNC: 81 MG/DL (ref 0–99)
LEUKOCYTE ESTERASE UR QL STRIP: NEGATIVE
LYMPHOCYTES # BLD AUTO: 1.6 X10E3/UL (ref 0.7–3.1)
LYMPHOCYTES NFR BLD AUTO: 26 %
MCH RBC QN AUTO: 22.9 PG (ref 26.6–33)
MCHC RBC AUTO-ENTMCNC: 29.5 G/DL (ref 31.5–35.7)
MCV RBC AUTO: 78 FL (ref 79–97)
MICRO URNS: NORMAL
MONOCYTES # BLD AUTO: 0.5 X10E3/UL (ref 0.1–0.9)
MONOCYTES NFR BLD AUTO: 9 %
NEUTROPHILS # BLD AUTO: 3.8 X10E3/UL (ref 1.4–7)
NEUTROPHILS NFR BLD AUTO: 62 %
NITRITE UR QL STRIP: NEGATIVE
PH UR STRIP: 6.5 [PH] (ref 5–7.5)
PLATELET # BLD AUTO: 318 X10E3/UL (ref 150–379)
POTASSIUM SERPL-SCNC: 4.4 MMOL/L (ref 3.5–5.2)
PROT SERPL-MCNC: 7.6 G/DL (ref 6–8.5)
PROT UR QL STRIP: NEGATIVE
RBC # BLD AUTO: 4.63 X10E6/UL (ref 3.77–5.28)
SODIUM SERPL-SCNC: 135 MMOL/L (ref 134–144)
SP GR UR: 1 (ref 1–1.03)
TRIGL SERPL-MCNC: 36 MG/DL (ref 0–149)
TSH SERPL DL<=0.005 MIU/L-ACNC: 0.75 UIU/ML (ref 0.45–4.5)
UROBILINOGEN UR STRIP-MCNC: 0.2 MG/DL (ref 0.2–1)
VLDLC SERPL CALC-MCNC: 7 MG/DL (ref 5–40)
WBC # BLD AUTO: 6 X10E3/UL (ref 3.4–10.8)

## 2017-04-04 ENCOUNTER — OFFICE VISIT (OUTPATIENT)
Dept: FAMILY MEDICINE CLINIC | Age: 32
End: 2017-04-04

## 2017-04-04 VITALS
BODY MASS INDEX: 31.96 KG/M2 | HEIGHT: 67 IN | SYSTOLIC BLOOD PRESSURE: 115 MMHG | TEMPERATURE: 98.7 F | OXYGEN SATURATION: 99 % | HEART RATE: 87 BPM | WEIGHT: 203.6 LBS | RESPIRATION RATE: 16 BRPM | DIASTOLIC BLOOD PRESSURE: 65 MMHG

## 2017-04-04 DIAGNOSIS — F41.8 DEPRESSION WITH ANXIETY: ICD-10-CM

## 2017-04-04 DIAGNOSIS — R73.03 PREDIABETES: ICD-10-CM

## 2017-04-04 DIAGNOSIS — Z00.00 PHYSICAL EXAM, ANNUAL: Primary | ICD-10-CM

## 2017-04-04 DIAGNOSIS — E55.9 VITAMIN D DEFICIENCY: ICD-10-CM

## 2017-04-04 DIAGNOSIS — L70.0 ACNE VULGARIS: ICD-10-CM

## 2017-04-04 DIAGNOSIS — D50.9 IRON DEFICIENCY ANEMIA, UNSPECIFIED IRON DEFICIENCY ANEMIA TYPE: ICD-10-CM

## 2017-04-04 DIAGNOSIS — G43.909 MIGRAINE WITHOUT STATUS MIGRAINOSUS, NOT INTRACTABLE, UNSPECIFIED MIGRAINE TYPE: ICD-10-CM

## 2017-04-04 DIAGNOSIS — E66.9 OBESITY (BMI 30-39.9): ICD-10-CM

## 2017-04-04 RX ORDER — SUMATRIPTAN 50 MG/1
50 TABLET, FILM COATED ORAL
Qty: 9 TAB | Refills: 0 | Status: SHIPPED | OUTPATIENT
Start: 2017-04-04 | End: 2017-06-05

## 2017-04-04 RX ORDER — ERGOCALCIFEROL 1.25 MG/1
50000 CAPSULE ORAL
Qty: 12 CAP | Refills: 1 | Status: SHIPPED | OUTPATIENT
Start: 2017-04-04 | End: 2018-02-16

## 2017-04-04 RX ORDER — NITROFURANTOIN 25; 75 MG/1; MG/1
CAPSULE ORAL
COMMUNITY
Start: 2017-03-17 | End: 2017-04-04 | Stop reason: ALTCHOICE

## 2017-04-04 RX ORDER — METRONIDAZOLE 500 MG/1
TABLET ORAL
COMMUNITY
Start: 2017-03-21 | End: 2017-04-04 | Stop reason: ALTCHOICE

## 2017-04-04 RX ORDER — VENLAFAXINE HYDROCHLORIDE 37.5 MG/1
37.5 CAPSULE, EXTENDED RELEASE ORAL DAILY
Qty: 90 CAP | Refills: 1 | Status: SHIPPED | OUTPATIENT
Start: 2017-04-04 | End: 2017-10-23 | Stop reason: SDUPTHER

## 2017-04-04 NOTE — PATIENT INSTRUCTIONS
Migraine Headache: Care Instructions  Your Care Instructions  Migraines are painful, throbbing headaches that often start on one side of the head. They may cause nausea and vomiting and make you sensitive to light, sound, or smell. Without treatment, migraines can last from 4 hours to a few days. Medicines can help prevent migraines or stop them after they have started. Your doctor can help you find which ones work best for you. Follow-up care is a key part of your treatment and safety. Be sure to make and go to all appointments, and call your doctor if you are having problems. It's also a good idea to know your test results and keep a list of the medicines you take. How can you care for yourself at home? · Do not drive if you have taken a prescription pain medicine. · Rest in a quiet, dark room until your headache is gone. Close your eyes, and try to relax or go to sleep. Don't watch TV or read. · Put a cold, moist cloth or cold pack on the painful area for 10 to 20 minutes at a time. Put a thin cloth between the cold pack and your skin. · Use a warm, moist towel or a heating pad set on low to relax tight shoulder and neck muscles. · Have someone gently massage your neck and shoulders. · Take your medicines exactly as prescribed. Call your doctor if you think you are having a problem with your medicine. You will get more details on the specific medicines your doctor prescribes. · Be careful not to take pain medicine more often than the instructions allow. You could get worse or more frequent headaches when the medicine wears off. To prevent migraines  · Keep a headache diary so you can figure out what triggers your headaches. Avoiding triggers may help you prevent headaches. Record when each headache began, how long it lasted, and what the pain was like.  (Was it throbbing, aching, stabbing, or dull?) Write down any other symptoms you had with the headache, such as nausea, flashing lights or dark spots, or sensitivity to bright light or loud noise. Note if the headache occurred near your period. List anything that might have triggered the headache. Triggers may include certain foods (chocolate, cheese, wine) or odors, smoke, bright light, stress, or lack of sleep. · If your doctor has prescribed medicine for your migraines, take it as directed. You may have medicine that you take only when you get a migraine and medicine that you take all the time to help prevent migraines. ¨ If your doctor has prescribed medicine for when you get a headache, take it at the first sign of a migraine, unless your doctor has given you other instructions. ¨ If your doctor has prescribed medicine to prevent migraines, take it exactly as prescribed. Call your doctor if you think you are having a problem with your medicine. · Find healthy ways to deal with stress. Migraines are most common during or right after stressful times. Take time to relax before and after you do something that has caused a migraine in the past.  · Try to keep your muscles relaxed by keeping good posture. Check your jaw, face, neck, and shoulder muscles for tension. Try to relax them. When you sit at a desk, change positions often. And make sure to stretch for 30 seconds each hour. · Get plenty of sleep and exercise. · Eat meals on a regular schedule. Avoid foods and drinks that often trigger migraines. These include chocolate, alcohol (especially red wine and port), aspartame, monosodium glutamate (MSG), and some additives found in foods (such as hot dogs, mcgowan, cold cuts, aged cheeses, and pickled foods). · Limit caffeine. Don't drink too much coffee, tea, or soda. But don't quit caffeine suddenly. That can also give you migraines. · Do not smoke or allow others to smoke around you. If you need help quitting, talk to your doctor about stop-smoking programs and medicines. These can increase your chances of quitting for good.   · If you are taking birth control pills or hormone therapy, talk to your doctor about whether they are triggering your migraines. When should you call for help? Call 911 anytime you think you may need emergency care. For example, call if:  · You have signs of a stroke. These may include:  ¨ Sudden numbness, paralysis, or weakness in your face, arm, or leg, especially on only one side of your body. ¨ Sudden vision changes. ¨ Sudden trouble speaking. ¨ Sudden confusion or trouble understanding simple statements. ¨ Sudden problems with walking or balance. ¨ A sudden, severe headache that is different from past headaches. Call your doctor now or seek immediate medical care if:  · You have new or worse nausea and vomiting. · You have a new or higher fever. · Your headache gets much worse. Watch closely for changes in your health, and be sure to contact your doctor if:  · You are not getting better after 2 days (48 hours). Where can you learn more? Go to http://mitch-radha.info/. Enter Y960 in the search box to learn more about \"Migraine Headache: Care Instructions. \"  Current as of: October 14, 2016  Content Version: 11.2  © 3316-8334 [employer]. Care instructions adapted under license by [employer] (which disclaims liability or warranty for this information). If you have questions about a medical condition or this instruction, always ask your healthcare professional. [address] any warranty or liability for your use of this information.

## 2017-04-04 NOTE — PROGRESS NOTES
HISTORY OF PRESENT ILLNESS  Rachele Hamman is a 33 y.o. female. HPI    Here for physical exam and lab review. Had fasting labs drawn last week. Anxiety/Depression  Currently taking effexor 37.5mg once daily, started in January. PHQ-9 score of 16 initially, 11 at last visit, 5 today. Feels so much better since starting the effexor and exercising. Has had symptoms since her kids were born; she split with her  shortly after that. She moved, changed job, her dad is legally blind and has moved in. Her stepkids also come frequently. Work is also very stressful, as it has grown a lot. She has financial stressors as well. Some days are better than others. Headaches  Started getting headaches earlier this year, right before starting effexor. Pain is over top of her eyes. Isn't sure if it is an eye headache; going to eye doctor next month. Pain is almost a \"stabbing\" pain, forces her to close her eyes. Occurs 1 - 2x per week, sometimes more depending on the week. Has not noticed any triggers; thought it was stress or the effexor, but not noticing any triggers at this point. Will take BC powder, lay down in a dark room, put a warm compress and they will subside eventually. (+) photophobia  (-) phonophobia, nausea, vomiting, weakness/numbness/tingling  Starts off mild and then progresses; denies thunderclap headache or \"worst headache of her life. \"   No history of headaches in the past. No family of any brain disorders. Adult Acne  Thought the lexapro was causing adult acne, but it has persisted even since switching to effexor. Using Sukhjinder's vinegar and black soap. Was using clearasil, which hasn't helped. Worse around her menses. Painful acne. Has never seen a dermatologist, but would like to. Preventative Care  Flu shot: already got this season  TDaP: declines  Pap smear: February 2016 with OB/GYN; normal with negative HPV  Denies any family hx of breast or colon CA.      Healthy Habits  Patient is exercising 0x per week, going to the gym twice a week on Tuesdays and Thursdays. She is doing some light cardio and weight-training. Feels good,feels \"more alive. \"   Patient endorses unhealthy diet; avoids fatty/greasy foods, sugar-sweetened beverages. Eats a lot of take-out food at work. Has been working on portion control over the past 2 weeks. Denies tobacco use. Social alcohol use. Denies illicit drug use. Sexually active with 1 male partner in last 12 months.; no concern for STIs today. Lab Review  Normals: CMP, UA, TSH, lipid panel  Abnormals: low Hgb (10.6), impaired fasting glucose (A1C 5.7%), low vit D     Lab Results  Component Value Date/Time   WBC 6.0 03/24/2017 12:00 AM   HGB 10.6 03/24/2017 12:00 AM   HCT 35.9 03/24/2017 12:00 AM   PLATELET 138 29/40/7185 12:00 AM   MCV 78 03/24/2017 12:00 AM       Lab Results  Component Value Date/Time   Cholesterol, total 149 03/24/2017 12:00 AM   HDL Cholesterol 61 03/24/2017 12:00 AM   LDL, calculated 81 03/24/2017 12:00 AM   Triglyceride 36 03/24/2017 12:00 AM       Lab Results  Component Value Date/Time   TSH 0.746 03/24/2017 12:00 AM      Lab Results   Component Value Date/Time    Sodium 135 03/24/2017 12:00 AM    Potassium 4.4 03/24/2017 12:00 AM    Chloride 96 03/24/2017 12:00 AM    CO2 25 03/24/2017 12:00 AM    Glucose 88 03/24/2017 12:00 AM    BUN 11 03/24/2017 12:00 AM    Creatinine 0.71 03/24/2017 12:00 AM    BUN/Creatinine ratio 15 03/24/2017 12:00 AM    GFR est  03/24/2017 12:00 AM    GFR est non- 03/24/2017 12:00 AM    Calcium 8.9 03/24/2017 12:00 AM    Bilirubin, total 0.2 03/24/2017 12:00 AM    ALT (SGPT) 19 03/24/2017 12:00 AM    AST (SGOT) 17 03/24/2017 12:00 AM    Alk.  phosphatase 67 03/24/2017 12:00 AM    Protein, total 7.6 03/24/2017 12:00 AM    Albumin 4.2 03/24/2017 12:00 AM    A-G Ratio 1.2 03/24/2017 12:00 AM      Lab Results   Component Value Date/Time    Hemoglobin A1c 5.7 03/24/2017 12:00 AM Past Medical History:   Diagnosis Date    Depression      Past Surgical History:   Procedure Laterality Date    HX GYN  2011     Family History   Problem Relation Age of Onset    Hypertension Mother    [employer] [place] Anxiety Mother     Neuropathy Mother     Elevated Lipids Mother     COPD Father    [employer] [place] Glaucoma Father     Depression Father     Hypertension Sister     No Known Problems Sister     Lupus Sister     Thyroid Disease Sister      Social History     Social History    Marital status: SINGLE     Spouse name: N/A    Number of children: N/A    Years of education: N/A     Social History Main Topics    Smoking status: Never Smoker    Smokeless tobacco: Never Used    Alcohol use Yes      Comment: occasional    Drug use: No    Sexual activity: Yes     Partners: Male     Birth control/ protection: Surgical     Other Topics Concern    None     Social History Narrative     Patient Active Problem List   Diagnosis Code    Depression with anxiety F41.8    Obesity (BMI 30-39. 9) E66.9    Iron deficiency anemia D50.9    Prediabetes R73.03    Vitamin D deficiency E55.9     Outpatient Encounter Prescriptions as of 4/4/2017   Medication Sig Dispense Refill    SUMAtriptan (IMITREX) 50 mg tablet Take 1 Tab by mouth once as needed for Migraine for up to 1 dose. Indications: MIGRAINE 9 Tab 0    Iron, Cbn & Gluc-FA-B12-C-DSS (FERRALET 90 DUAL-IRON DELIVERY) 90-1-12-50 mg-mg-mcg-mg tab Take 1 Tab by mouth daily. 30 Tab 3    ergocalciferol (ERGOCALCIFEROL) 50,000 unit capsule Take 1 Cap by mouth every seven (7) days. 12 Cap 1    salicylic acid 2% 2 % topical cream Apply  to affected area daily. 18 g 1    venlafaxine-SR (EFFEXOR-XR) 37.5 mg capsule Take 1 Cap by mouth daily. 90 Cap 1    [DISCONTINUED] metroNIDAZOLE (FLAGYL) 500 mg tablet       [DISCONTINUED] nitrofurantoin, macrocrystal-monohydrate, (MACROBID) 100 mg capsule       [DISCONTINUED] venlafaxine-SR (EFFEXOR-XR) 37.5 mg capsule Take 1 Cap by mouth daily. 30 Cap 0     No facility-administered encounter medications on file as of 4/4/2017. Visit Vitals    /65 (BP 1 Location: Right arm, BP Patient Position: Sitting)    Pulse 87    Temp 98.7 °F (37.1 °C) (Oral)    Resp 16    Ht 5' 7\" (1.702 m)    Wt 203 lb 9.6 oz (92.4 kg)    LMP 03/24/2017 (Approximate)    SpO2 99%    BMI 31.89 kg/m2         Review of Systems   Constitutional: Negative for chills, fever and malaise/fatigue. HENT: Negative for congestion, ear pain and sore throat. Eyes: Negative for blurred vision, double vision, pain and discharge. Respiratory: Negative for cough, shortness of breath and wheezing. Cardiovascular: Negative for chest pain, palpitations and leg swelling. Gastrointestinal: Negative for abdominal pain, blood in stool, constipation, diarrhea, heartburn, melena, nausea and vomiting. Genitourinary: Negative for dysuria, frequency and hematuria. Musculoskeletal: Negative for myalgias. Neurological: Positive for headaches. Negative for tingling, tremors, sensory change, speech change, focal weakness, seizures, loss of consciousness and weakness. Endo/Heme/Allergies: Does not bruise/bleed easily. Psychiatric/Behavioral: Positive for depression. Negative for substance abuse and suicidal ideas. The patient is not nervous/anxious. Physical Exam   Constitutional: She is oriented to person, place, and time. She appears well-developed and well-nourished. No distress. HENT:   Head: Normocephalic and atraumatic. Right Ear: External ear normal.   Left Ear: External ear normal.   Nose: Nose normal.   Mouth/Throat: Oropharynx is clear and moist. No oropharyngeal exudate. Eyes: Conjunctivae and EOM are normal. Pupils are equal, round, and reactive to light. Right eye exhibits no discharge. Left eye exhibits no discharge. Neck: Normal range of motion. Neck supple. No thyromegaly present.    Cardiovascular: Normal rate, regular rhythm, normal heart sounds and intact distal pulses. No murmur heard. Pulmonary/Chest: Effort normal and breath sounds normal. No respiratory distress. She has no wheezes. She has no rales. Abdominal: Soft. Bowel sounds are normal. She exhibits no distension and no mass. There is no tenderness. There is no rebound and no guarding. Musculoskeletal: Normal range of motion. Lymphadenopathy:     She has no cervical adenopathy. Neurological: She is alert and oriented to person, place, and time. She has normal reflexes. No cranial nerve deficit. Coordination normal.   Skin: Skin is warm and dry. She is not diaphoretic. Psychiatric: She has a normal mood and affect. Her behavior is normal. Judgment normal.   Nursing note and vitals reviewed. ASSESSMENT and PLAN    ICD-10-CM ICD-9-CM    1. Physical exam, annual Z00.00 V70.0    2. Iron deficiency anemia, unspecified iron deficiency anemia type D50.9 280.9 Iron, Cbn & Gluc-FA-B12-C-DSS (FERRALET 90 DUAL-IRON DELIVERY) 90-1-12-50 mg-mg-mcg-mg tab      FERRITIN      IRON PROFILE      CBC WITH AUTOMATED DIFF   3. Prediabetes R73.03 790.29 AMB POC HEMOGLOBIN R6H      METABOLIC PANEL, COMPREHENSIVE   4. Depression with anxiety F41.8 300.4 venlafaxine-SR (EFFEXOR-XR) 37.5 mg capsule   5. Obesity (BMI 30-39. 9) E66.9 278.00    6. Vitamin D deficiency E55.9 268.9 ergocalciferol (ERGOCALCIFEROL) 50,000 unit capsule      VITAMIN D, 25 HYDROXY   7. Migraine without status migrainosus, not intractable, unspecified migraine type G43.909 346.90 SUMAtriptan (IMITREX) 50 mg tablet   8. Acne vulgaris A70.1 566.2 salicylic acid 2% 2 % topical cream      REFERRAL TO DERMATOLOGY     1. CPE  Normal CPE today. Breast/pelvic exam done at gyn last week, so deferred today. Preventative care UTD. 2. Depression with anxiety  Greatly improved on effexor. Continue at current dose. 3. Iron deficiency anemia  Patient endorses hx of this, was on iron in the past but it made her constipated.  Will trial Ferralet; Rx coupon given. If too expensive, will start iron and docusate separately. 4. Migraines  No alarm findings in history or on exam, though new onset is concerning. For now, will try abortive therapy with imitrex. If no improvement, will send for brain imaging and/or to neurology. Patient declines prophylactic medication at this time. 5. Adult acne  Stop OTC home remedies. Trial salicylic acid cream. Referral placed to dermatology. 6. Vit D deficiency  Start high-dose vit D supplementation once weekly. 7. Pre-diabetes  Discussed diet/exercise changes. Recheck in 3 months. Return in 3 months for non-fasting labs and med f/u 1 week later. Follow-up Disposition:  Return in about 3 months (around 7/4/2017) for non-fasting labs and med f/u 1 week later.    Minus KEYANA Carpenter

## 2017-04-04 NOTE — PROGRESS NOTES
Chief Complaint   Patient presents with    Complete Physical     lab review     Patient here for CPE. Pap smear and breast exam done by GYN. Patient would life referral to dermatology for adult acne. Patient concerned about migraines.

## 2017-04-04 NOTE — MR AVS SNAPSHOT
Visit Information Date & Time Provider Department Dept. Phone Encounter #  
 4/4/2017  3:30 PM Claudean Jeans, NP [facility] [phone] [id number] Follow-up Instructions Return in about 3 months (around 7/4/2017) for non-fasting labs and med f/u 1 week later. Upcoming Health Maintenance Date Due  
 PAP AKA CERVICAL CYTOLOGY 2/2/2018 DTaP/Tdap/Td series (2 - Td) 1/16/2027 Allergies as of 4/4/2017  Review Complete On: 4/4/2017 By: Tera Guzman LPN No Known Allergies Current Immunizations  Never Reviewed No immunizations on file. Not reviewed this visit You Were Diagnosed With   
  
 Codes Comments Physical exam, annual    -  Primary ICD-10-CM: Z00.00 ICD-9-CM: V70.0 Iron deficiency anemia, unspecified iron deficiency anemia type     ICD-10-CM: D50.9 ICD-9-CM: 280.9 Prediabetes     ICD-10-CM: R73.03 
ICD-9-CM: 790.29 Depression with anxiety     ICD-10-CM: F41.8 ICD-9-CM: 300.4 Obesity (BMI 30-39. 9)     ICD-10-CM: E66.9 ICD-9-CM: 278.00 Vitamin D deficiency     ICD-10-CM: E55.9 ICD-9-CM: 268.9 Migraine without status migrainosus, not intractable, unspecified migraine type     ICD-10-CM: G43.909 ICD-9-CM: 346.90 Acne vulgaris     ICD-10-CM: L70.0 ICD-9-CM: 706.1 Vitals BP Pulse Temp Resp Height(growth percentile) Weight(growth percentile)  
 115/65 (BP 1 Location: Right arm, BP Patient Position: Sitting) 87 98.7 °F (37.1 °C) (Oral) 16 5' 7\" (1.702 m) 203 lb 9.6 oz (92.4 kg) LMP SpO2 BMI OB Status Smoking Status 03/24/2017 (Approximate) 99% 31.89 kg/m2 Having regular periods Never Smoker BMI and BSA Data Body Mass Index Body Surface Area  
 31.89 kg/m 2 2.09 m 2 Preferred Pharmacy Pharmacy Name Phone [facility] [address] [place] [address] [phone] Your Updated Medication List  
  
   
 This list is accurate as of: 4/4/17  4:10 PM.  Always use your most recent med list.  
  
  
  
  
 ergocalciferol 50,000 unit capsule Commonly known as:  ERGOCALCIFEROL Take 1 Cap by mouth every seven (7) days. Iron, Cbn & Gluc-FA-B12-C-DSS 90-1-12-50 mg-mg-mcg-mg Tab Commonly known as:  FERRALET 90 DUAL-IRON DELIVERY Take 1 Tab by mouth daily. salicylic acid 2% 2 % topical cream  
Apply  to affected area daily. SUMAtriptan 50 mg tablet Commonly known as:  IMITREX Take 1 Tab by mouth once as needed for Migraine for up to 1 dose. Indications: MIGRAINE  
  
 venlafaxine-SR 37.5 mg capsule Commonly known as:  EFFEXOR-XR Take 1 Cap by mouth daily. Prescriptions Sent to Pharmacy Refills SUMAtriptan (IMITREX) 50 mg tablet 0 Sig: Take 1 Tab by mouth once as needed for Migraine for up to 1 dose. Indications: MIGRAINE Class: Normal  
 Pharmacy: [address] Ph #: [phone] Route: Oral  
 Iron, Cbn & Gluc-FA-B12-C-DSS (FERRALET 90 DUAL-IRON DELIVERY) 90-1-12-50 mg-mg-mcg-mg tab 3 Sig: Take 1 Tab by mouth daily. Class: Normal  
 Pharmacy: [address] Ph #: [phone] Route: Oral  
 ergocalciferol (ERGOCALCIFEROL) 50,000 unit capsule 1 Sig: Take 1 Cap by mouth every seven (7) days. Class: Normal  
 Pharmacy: [address] Ph #: [phone] Route: Oral  
 salicylic acid 2% 2 % topical cream 1 Sig: Apply  to affected area daily. Class: Normal  
 Pharmacy: [address] Ph #: [phone] Route: Topical  
 venlafaxine-SR (EFFEXOR-XR) 37.5 mg capsule 1 Sig: Take 1 Cap by mouth daily. Class: Normal  
 Pharmacy: [address] Ph #: [phone] Route: Oral  
  
We Performed the Following REFERRAL TO DERMATOLOGY [REF19 Custom] Comments:  
 Please evaluate patient for acne. Follow-up Instructions Return in about 3 months (around 7/4/2017) for non-fasting labs and med f/u 1 week later. To-Do List   
 07/04/2017 Point of Care Testing:  AMB POC HEMOGLOBIN A1C   
  
 07/04/2017 Lab:  CBC WITH AUTOMATED DIFF   
  
 07/04/2017 Lab:  FERRITIN   
  
 07/04/2017 Lab:  IRON PROFILE   
  
 07/04/2017 Lab:  METABOLIC PANEL, COMPREHENSIVE   
  
 07/04/2017 Lab:  VITAMIN D, 25 HYDROXY Referral Information Referral ID Referred By Referred To  
  
 [id number] [place], [address]  Magui Chaparro MD   
   [address] [facility] [address] [place], [facility] Phone: [phone] Fax: [phone] Visits Status Start Date End Date 1 New Request 4/4/17 4/4/18 If your referral has a status of pending review or denied, additional information will be sent to support the outcome of this decision. Patient Instructions Migraine Headache: Care Instructions Your Care Instructions Migraines are painful, throbbing headaches that often start on one side of the head. They may cause nausea and vomiting and make you sensitive to light, sound, or smell. Without treatment, migraines can last from 4 hours to a few days. Medicines can help prevent migraines or stop them after they have started. Your doctor can help you find which ones work best for you. Follow-up care is a key part of your treatment and safety. Be sure to make and go to all appointments, and call your doctor if you are having problems. It's also a good idea to know your test results and keep a list of the medicines you take. How can you care for yourself at home? · Do not drive if you have taken a prescription pain medicine. · Rest in a quiet, dark room until your headache is gone. Close your eyes, and try to relax or go to sleep. Don't watch TV or read. · Put a cold, moist cloth or cold pack on the painful area for 10 to 20 minutes at a time. Put a thin cloth between the cold pack and your skin. · Use a warm, moist towel or a heating pad set on low to relax tight shoulder and neck muscles. · Have someone gently massage your neck and shoulders. · Take your medicines exactly as prescribed. Call your doctor if you think you are having a problem with your medicine. You will get more details on the specific medicines your doctor prescribes. · Be careful not to take pain medicine more often than the instructions allow. You could get worse or more frequent headaches when the medicine wears off. To prevent migraines · Keep a headache diary so you can figure out what triggers your headaches. Avoiding triggers may help you prevent headaches. Record when each headache began, how long it lasted, and what the pain was like. (Was it throbbing, aching, stabbing, or dull?) Write down any other symptoms you had with the headache, such as nausea, flashing lights or dark spots, or sensitivity to bright light or loud noise. Note if the headache occurred near your period. List anything that might have triggered the headache. Triggers may include certain foods (chocolate, cheese, wine) or odors, smoke, bright light, stress, or lack of sleep. · If your doctor has prescribed medicine for your migraines, take it as directed. You may have medicine that you take only when you get a migraine and medicine that you take all the time to help prevent migraines. ¨ If your doctor has prescribed medicine for when you get a headache, take it at the first sign of a migraine, unless your doctor has given you other instructions. ¨ If your doctor has prescribed medicine to prevent migraines, take it exactly as prescribed. Call your doctor if you think you are having a problem with your medicine. · Find healthy ways to deal with stress.  Migraines are most common during or right after stressful times. Take time to relax before and after you do something that has caused a migraine in the past. 
· Try to keep your muscles relaxed by keeping good posture. Check your jaw, face, neck, and shoulder muscles for tension. Try to relax them. When you sit at a desk, change positions often. And make sure to stretch for 30 seconds each hour. · Get plenty of sleep and exercise. · Eat meals on a regular schedule. Avoid foods and drinks that often trigger migraines. These include chocolate, alcohol (especially red wine and port), aspartame, monosodium glutamate (MSG), and some additives found in foods (such as hot dogs, mcgowan, cold cuts, aged cheeses, and pickled foods). · Limit caffeine. Don't drink too much coffee, tea, or soda. But don't quit caffeine suddenly. That can also give you migraines. · Do not smoke or allow others to smoke around you. If you need help quitting, talk to your doctor about stop-smoking programs and medicines. These can increase your chances of quitting for good. · If you are taking birth control pills or hormone therapy, talk to your doctor about whether they are triggering your migraines. When should you call for help? Call 911 anytime you think you may need emergency care. For example, call if: 
· You have signs of a stroke. These may include: 
¨ Sudden numbness, paralysis, or weakness in your face, arm, or leg, especially on only one side of your body. ¨ Sudden vision changes. ¨ Sudden trouble speaking. ¨ Sudden confusion or trouble understanding simple statements. ¨ Sudden problems with walking or balance. ¨ A sudden, severe headache that is different from past headaches. Call your doctor now or seek immediate medical care if: 
· You have new or worse nausea and vomiting. · You have a new or higher fever. · Your headache gets much worse. Watch closely for changes in your health, and be sure to contact your doctor if: · You are not getting better after 2 days (48 hours). Where can you learn more? Go to http://mitch-radha.info/. Enter D048 in the search box to learn more about \"Migraine Headache: Care Instructions. \" Current as of: October 14, 2016 Content Version: 11.2 © 3894-8422 [employer]. Care instructions adapted under license by [employer] (which disclaims liability or warranty for this information). If you have questions about a medical condition or this instruction, always ask your healthcare professional. [place] [address] any warranty or liability for your use of this information. Introducing [facility]! [address] introduces [facility] patient portal. Now you can access parts of your medical record, email your doctor's office, and request medication refills online. 1. In your internet browser, go to [email or web address] 2. Click on the First Time User? Click Here link in the Sign In box. You will see the New Member Sign Up page. 3. Enter your [facility] Access Code exactly as it appears below. You will not need to use this code after youve completed the sign-up process. If you do not sign up before the expiration date, you must request a new code. · [facility] Access Code: [email or web address] Expires: 4/16/2017  4:11 PM 
 
4. Enter the last four digits of your Social Security Number (xxxx) and Date of Birth (mm/dd/yyyy) as indicated and click Submit. You will be taken to the next sign-up page. 5. Create a [employer] ID. This will be your [facility] login ID and cannot be changed, so think of one that is secure and easy to remember. 6. Create a [facility] password. You can change your password at any time. 7. Enter your Password Reset Question and Answer. This can be used at a later time if you forget your password. 8. Enter your e-mail address.  You will receive e-mail notification when new information is available in [employer]. 9. Click Sign Up. You can now view and download portions of your medical record. 10. Click the Download Summary menu link to download a portable copy of your medical information. If you have questions, please visit the Frequently Asked Questions section of the [employer] website. Remember, [employer] is NOT to be used for urgent needs. For medical emergencies, dial 911. Now available from your iPhone and Android! Please provide this summary of care documentation to your next provider. Your primary care clinician is listed as Amber Maciel. If you have any questions after today's visit, please call [phone].

## 2017-04-13 ENCOUNTER — TELEPHONE (OUTPATIENT)
Dept: FAMILY MEDICINE CLINIC | Age: 32
End: 2017-04-13

## 2017-04-13 NOTE — TELEPHONE ENCOUNTER
----- Message from Reid Michaud sent at 4/12/2017  5:01 PM EDT -----  Regarding: Abhishek Murillo NP/prescription request  Pt needs for Abhishek Murillo NP to call the [address] [place] [address] p) [phone] to provide clarification on the directions for the Toprocall cream. Pt can be reached at [phone].

## 2017-04-13 NOTE — TELEPHONE ENCOUNTER
Spoke with [employer] regarding salicylic acid topical cream 2% for patient's acne. Per Pharmacist at [facility] this strength is not available for the warehouse that [facility] gets their supplies from. Pharmacist gave two options 1)change strength to 1% or 3% or 5% or option 2) check with different pharmacy who uses a different supplier and may have med. Pharmacist at [facility] notified that prescriber is out of office until 4/18. Patient also notified that pcp is out of office until 4/18 and will instruct nurse of preference upon her return to office. Per patient if a second pharmacy is needed we may use [facility] on [address]. Patient notified that I will call her on Tues 4/18 with a status update. Patient verbalized understanding.

## 2017-04-18 ENCOUNTER — TELEPHONE (OUTPATIENT)
Dept: FAMILY MEDICINE CLINIC | Age: 32
End: 2017-04-18

## 2017-04-18 NOTE — TELEPHONE ENCOUNTER
Per [facility] ( patient's back up plan pharmacy) they do not have the salicylic acid in stock. [facility] uses the same  for medications as [facility]. Spoke with pharmacist at [facility] to confirm salicylic acid prescription strength changed from 2% to 3% per Heather Raymond NP. Message left on patient's voice mail requesting a return call.

## 2017-04-18 NOTE — TELEPHONE ENCOUNTER
Patient notified that prescription for salicylic acid strength changed from 2% to 3% . Prescription strength changed to 3% due to lower strength not available. Patient verbalized understanding.

## 2017-06-05 ENCOUNTER — OFFICE VISIT (OUTPATIENT)
Dept: FAMILY MEDICINE CLINIC | Age: 32
End: 2017-06-05

## 2017-06-05 VITALS
OXYGEN SATURATION: 99 % | RESPIRATION RATE: 16 BRPM | TEMPERATURE: 98.3 F | DIASTOLIC BLOOD PRESSURE: 62 MMHG | SYSTOLIC BLOOD PRESSURE: 104 MMHG | BODY MASS INDEX: 31.67 KG/M2 | HEART RATE: 82 BPM | HEIGHT: 67 IN | WEIGHT: 201.8 LBS

## 2017-06-05 DIAGNOSIS — R30.0 DYSURIA: ICD-10-CM

## 2017-06-05 DIAGNOSIS — N30.01 ACUTE CYSTITIS WITH HEMATURIA: Primary | ICD-10-CM

## 2017-06-05 LAB
BILIRUB UR QL STRIP: NEGATIVE
GLUCOSE UR-MCNC: NEGATIVE MG/DL
KETONES P FAST UR STRIP-MCNC: NEGATIVE MG/DL
PH UR STRIP: 5.5 [PH] (ref 4.6–8)
PROT UR QL STRIP: NEGATIVE MG/DL
SP GR UR STRIP: 1.01 (ref 1–1.03)
UA UROBILINOGEN AMB POC: NORMAL (ref 0.2–1)
URINALYSIS CLARITY POC: CLEAR
URINALYSIS COLOR POC: YELLOW
URINE BLOOD POC: NORMAL
URINE LEUKOCYTES POC: NORMAL
URINE NITRITES POC: NEGATIVE

## 2017-06-05 RX ORDER — NITROFURANTOIN 25; 75 MG/1; MG/1
100 CAPSULE ORAL 2 TIMES DAILY
Qty: 14 CAP | Refills: 0 | Status: SHIPPED | OUTPATIENT
Start: 2017-06-05 | End: 2017-06-12

## 2017-06-05 RX ORDER — FLUCONAZOLE 150 MG/1
TABLET ORAL
COMMUNITY
Start: 2017-04-10 | End: 2017-06-05 | Stop reason: ALTCHOICE

## 2017-06-05 RX ORDER — CLINDAMYCIN PHOSPHATE AND BENZOYL PEROXIDE 10; 50 MG/G; MG/G
GEL TOPICAL
COMMUNITY
Start: 2017-05-18 | End: 2018-06-15 | Stop reason: SDUPTHER

## 2017-06-05 RX ORDER — PHENAZOPYRIDINE HYDROCHLORIDE 200 MG/1
200 TABLET, FILM COATED ORAL
Qty: 9 TAB | Refills: 0 | Status: SHIPPED | OUTPATIENT
Start: 2017-06-05 | End: 2017-06-08

## 2017-06-05 RX ORDER — TRETINOIN 1 MG/G
CREAM TOPICAL
COMMUNITY
Start: 2017-05-17 | End: 2018-06-15 | Stop reason: SDUPTHER

## 2017-06-05 NOTE — MR AVS SNAPSHOT
Visit Information Date & Time Provider Department Dept. Phone Encounter #  
 6/5/2017  3:00 PM Serena Constantino NP [facility] [phone] [id number] Follow-up Instructions Return if symptoms worsen or fail to improve. Your Appointments 7/7/2017  3:00 PM  
Any with Serena Constantino NP [facility]) Appt Note:  med, lab f/u  
 [address] [facility] [id number] [phone] [address] Upcoming Health Maintenance Date Due INFLUENZA AGE 9 TO ADULT 8/1/2017 PAP AKA CERVICAL CYTOLOGY 2/2/2018 DTaP/Tdap/Td series (2 - Td) 1/16/2027 Allergies as of 6/5/2017  Review Complete On: 6/5/2017 By: Zayda Leyva LPN No Known Allergies Current Immunizations  Never Reviewed No immunizations on file. Not reviewed this visit You Were Diagnosed With   
  
 Codes Comments Acute cystitis with hematuria    -  Primary ICD-10-CM: N30.01 
ICD-9-CM: 595.0 Dysuria     ICD-10-CM: R30.0 ICD-9-CM: 456. 1 Vitals BP Pulse Temp Resp Height(growth percentile) Weight(growth percentile) 104/62 (BP 1 Location: Right arm, BP Patient Position: Sitting) 82 98.3 °F (36.8 °C) (Oral) 16 5' 7\" (1.702 m) 201 lb 12.8 oz (91.5 kg) LMP SpO2 BMI OB Status Smoking Status 05/25/2017 (Approximate) 99% 31.61 kg/m2 Having regular periods Never Smoker BMI and BSA Data Body Mass Index Body Surface Area  
 31.61 kg/m 2 2.08 m 2 Preferred Pharmacy Pharmacy Name Phone [facility]armen [facility]ez [facility], [address] [place] [address] [phone] Your Updated Medication List  
  
   
This list is accurate as of: 6/5/17  3:22 PM.  Always use your most recent med list.  
  
  
  
  
 clindamycin-benzoyl Peroxide 1.2 %(1 % base) -5 % SR topical gel Commonly known as:  DUAC  
  
 ergocalciferol 50,000 unit capsule Commonly known as:  ERGOCALCIFEROL Take 1 Cap by mouth every seven (7) days. Iron, Cbn & Gluc-FA-B12-C-DSS 90-1-12-50 mg-mg-mcg-mg Tab Commonly known as:  FERRALET 90 DUAL-IRON DELIVERY Take 1 Tab by mouth daily. nitrofurantoin (macrocrystal-monohydrate) 100 mg capsule Commonly known as:  MACROBID Take 1 Cap by mouth two (2) times a day for 7 days. Indications: BACTERIAL URINARY TRACT INFECTION  
  
 phenazopyridine 200 mg tablet Commonly known as:  PYRIDIUM Take 1 Tab by mouth three (3) times daily as needed for Pain for up to 3 days. Indications: Dysuria  
  
 tretinoin 0.1 % topical cream  
Commonly known as:  RETIN-A  
  
 venlafaxine-SR 37.5 mg capsule Commonly known as:  EFFEXOR-XR Take 1 Cap by mouth daily. Prescriptions Sent to Pharmacy Refills  
 nitrofurantoin, macrocrystal-monohydrate, (MACROBID) 100 mg capsule 0 Sig: Take 1 Cap by mouth two (2) times a day for 7 days. Indications: BACTERIAL URINARY TRACT INFECTION Class: Normal  
 Pharmacy: [address] Ph #: [phone] Route: Oral  
 phenazopyridine (PYRIDIUM) 200 mg tablet 0 Sig: Take 1 Tab by mouth three (3) times daily as needed for Pain for up to 3 days. Indications: Dysuria Class: Normal  
 Pharmacy: [address] Ph #: [phone] Route: Oral  
  
We Performed the Following AMB POC URINALYSIS DIP STICK AUTO W/ MICRO [42059 CPT(R)] CULTURE, URINE [id number] CPT(R)] Follow-up Instructions Return if symptoms worsen or fail to improve. Patient Instructions Urinary Tract Infection in Women: Care Instructions Your Care Instructions A urinary tract infection, or UTI, is a general term for an infection anywhere between the kidneys and the urethra (where urine comes out). Most UTIs are bladder infections. They often cause pain or burning when you urinate. UTIs are caused by bacteria and can be cured with antibiotics. Be sure to complete your treatment so that the infection goes away. Follow-up care is a key part of your treatment and safety. Be sure to make and go to all appointments, and call your doctor if you are having problems. It's also a good idea to know your test results and keep a list of the medicines you take. How can you care for yourself at home? · Take your antibiotics as directed. Do not stop taking them just because you feel better. You need to take the full course of antibiotics. · Drink extra water and other fluids for the next day or two. This may help wash out the bacteria that are causing the infection. (If you have kidney, heart, or liver disease and have to limit fluids, talk with your doctor before you increase your fluid intake.) · Avoid drinks that are carbonated or have caffeine. They can irritate the bladder. · Urinate often. Try to empty your bladder each time. · To relieve pain, take a hot bath or lay a heating pad set on low over your lower belly or genital area. Never go to sleep with a heating pad in place. To prevent UTIs · Drink plenty of water each day. This helps you urinate often, which clears bacteria from your system. (If you have kidney, heart, or liver disease and have to limit fluids, talk with your doctor before you increase your fluid intake.) · Urinate when you need to. · Urinate right after you have sex. · Change sanitary pads often. · Avoid douches, bubble baths, feminine hygiene sprays, and other feminine hygiene products that have deodorants. · After going to the bathroom, wipe from front to back. When should you call for help? Call your doctor now or seek immediate medical care if: · Symptoms such as fever, chills, nausea, or vomiting get worse or appear for the first time. · You have new pain in your back just below your rib cage. This is called flank pain. · There is new blood or pus in your urine. · You have any problems with your antibiotic medicine. Watch closely for changes in your health, and be sure to contact your doctor if: 
· You are not getting better after taking an antibiotic for 2 days. · Your symptoms go away but then come back. Where can you learn more? Go to http://mitch-radha.info/. Enter J904 in the search box to learn more about \"Urinary Tract Infection in Women: Care Instructions. \" Current as of: November 28, 2016 Content Version: 11.2 © 6261-8812 [employer]. Care instructions adapted under license by [employer] (which disclaims liability or warranty for this information). If you have questions about a medical condition or this instruction, always ask your healthcare professional. [address] any warranty or liability for your use of this information. Introducing [facility] & HEALTH SERVICES! Oksana Beebe introduces [employer] patient portal. Now you can access parts of your medical record, email your doctor's office, and request medication refills online. 1. In your internet browser, go to [email or web address] 2. Click on the First Time User? Click Here link in the Sign In box. You will see the New Member Sign Up page. 3. Enter your [employer] Access Code exactly as it appears below. You will not need to use this code after youve completed the sign-up process. If you do not sign up before the expiration date, you must request a new code. · [employer] Access Code: [email or web address] Expires: 9/3/2017  3:09 PM 
 
4. Enter the last four digits of your Social Security Number (xxxx) and Date of Birth (mm/dd/yyyy) as indicated and click Submit. You will be taken to the next sign-up page. 5. Create a [employer] ID. This will be your [employer] login ID and cannot be changed, so think of one that is secure and easy to remember. 6. Create a Public Mobile password. You can change your password at any time. 7. Enter your Password Reset Question and Answer. This can be used at a later time if you forget your password. 8. Enter your e-mail address. You will receive e-mail notification when new information is available in [address] 9. Click Sign Up. You can now view and download portions of your medical record. 10. Click the Download Summary menu link to download a portable copy of your medical information. If you have questions, please visit the Frequently Asked Questions section of the [employer] website. Remember, [employer] is NOT to be used for urgent needs. For medical emergencies, dial 911. Now available from your iPhone and Android! Please provide this summary of care documentation to your next provider. Your primary care clinician is listed as Mayda Frank. If you have any questions after today's visit, please call [phone].

## 2017-06-05 NOTE — PROGRESS NOTES
Chief Complaint   Patient presents with    Urinary Frequency     Patient here for urinary frequency, pressure and burning sensation.

## 2017-06-05 NOTE — PROGRESS NOTES
HISTORY OF PRESENT ILLNESS  Abby Boss is a 33 y.o. female. HPI  Here for an acute visit. Has a history of bladder infections and feels like she is developing one. Patient is here with complaints of dysuria. Symptoms x 1 day. (+) burning, urgency, frequency, lower abdominal pressure   (-) fevers, chills, flank pain, hematuria, vaginal discharge    No history of recent catheterization. Patient does not douche routinely. Also, everything else is going well. Doing family counseling once weekly, which is going very well, and feels great on the effexor. Saw Dr. Dinorah Valderrama, dermatology, who put her on Retin-A and a benzoyl peroxide cream, which are really helping her acne. Past Medical History:   Diagnosis Date    Depression      Past Surgical History:   Procedure Laterality Date    HX GYN  2011     Family History   Problem Relation Age of Onset    Hypertension Mother    Mitra Juan Francisco Anxiety Mother     Neuropathy Mother     Elevated Lipids Mother     COPD Father    Mitra Juan Francisco Glaucoma Father     Depression Father     Hypertension Sister     No Known Problems Sister     Lupus Sister     Thyroid Disease Sister      Social History     Social History    Marital status: SINGLE     Spouse name: N/A    Number of children: N/A    Years of education: N/A     Social History Main Topics    Smoking status: Never Smoker    Smokeless tobacco: Never Used    Alcohol use Yes      Comment: occasional    Drug use: No    Sexual activity: Yes     Partners: Male     Birth control/ protection: Surgical     Other Topics Concern    None     Social History Narrative     Patient Active Problem List   Diagnosis Code    Depression with anxiety F41.8    Obesity (BMI 30-39. 9) E66.9    Iron deficiency anemia D50.9    Prediabetes R73.03    Vitamin D deficiency E55.9     Outpatient Encounter Prescriptions as of 6/5/2017   Medication Sig Dispense Refill    clindamycin-benzoyl Peroxide (DUAC) 1.2 %(1 % base) -5 % SR topical gel       tretinoin (RETIN-A) 0.1 % topical cream       nitrofurantoin, macrocrystal-monohydrate, (MACROBID) 100 mg capsule Take 1 Cap by mouth two (2) times a day for 7 days. Indications: BACTERIAL URINARY TRACT INFECTION 14 Cap 0    phenazopyridine (PYRIDIUM) 200 mg tablet Take 1 Tab by mouth three (3) times daily as needed for Pain for up to 3 days. Indications: Dysuria 9 Tab 0    Iron, Cbn & Gluc-FA-B12-C-DSS (FERRALET 90 DUAL-IRON DELIVERY) 90-1-12-50 mg-mg-mcg-mg tab Take 1 Tab by mouth daily. 30 Tab 3    ergocalciferol (ERGOCALCIFEROL) 50,000 unit capsule Take 1 Cap by mouth every seven (7) days. 12 Cap 1    venlafaxine-SR (EFFEXOR-XR) 37.5 mg capsule Take 1 Cap by mouth daily. 90 Cap 1    [DISCONTINUED] fluconazole (DIFLUCAN) 150 mg tablet       [DISCONTINUED] SUMAtriptan (IMITREX) 50 mg tablet Take 1 Tab by mouth once as needed for Migraine for up to 1 dose. Indications: MIGRAINE 9 Tab 0    [DISCONTINUED] salicylic acid 2% 2 % topical cream Apply  to affected area daily. 18 g 1     No facility-administered encounter medications on file as of 6/5/2017. Visit Vitals    /62 (BP 1 Location: Right arm, BP Patient Position: Sitting)    Pulse 82    Temp 98.3 °F (36.8 °C) (Oral)    Resp 16    Ht 5' 7\" (1.702 m)    Wt 201 lb 12.8 oz (91.5 kg)    LMP 05/25/2017 (Approximate)    SpO2 99%    BMI 31.61 kg/m2         Review of Systems   Constitutional: Negative for chills, fever and malaise/fatigue. Genitourinary: Positive for dysuria, frequency and urgency. Negative for flank pain and hematuria. Physical Exam   Constitutional: She is oriented to person, place, and time. She appears well-developed and well-nourished. No distress. HENT:   Head: Normocephalic and atraumatic. Cardiovascular: Normal rate, regular rhythm and normal heart sounds. Abdominal:   Mild suprapubic tenderness; no CVA tenderness   Neurological: She is alert and oriented to person, place, and time.    Skin: Skin is warm and dry. She is not diaphoretic. Psychiatric: She has a normal mood and affect. Her behavior is normal. Judgment normal.   Nursing note and vitals reviewed. ASSESSMENT and PLAN    ICD-10-CM ICD-9-CM    1. Acute cystitis with hematuria N30.01 595.0 CULTURE, URINE      nitrofurantoin, macrocrystal-monohydrate, (MACROBID) 100 mg capsule      phenazopyridine (PYRIDIUM) 200 mg tablet   2. Dysuria R30.0 788.1 AMB POC URINALYSIS DIP STICK AUTO W/ MICRO     1. Acute cystitis  Treat with macrobid and pyridium; will send urine for culture. Follow up PRN. Follow-up Disposition:  Return if symptoms worsen or fail to improve.    Rob Lock, NP

## 2017-06-09 ENCOUNTER — TELEPHONE (OUTPATIENT)
Dept: FAMILY MEDICINE CLINIC | Age: 32
End: 2017-06-09

## 2017-06-09 NOTE — TELEPHONE ENCOUNTER
Patient notified of normal urine culture. Patient states the pressure feels the same as symptoms at March office visit. Patient to finish medication and if no better on Monday contact office and per pcp will put in a referral for urology. Patient verbalized understanding.

## 2017-06-09 NOTE — TELEPHONE ENCOUNTER
Pt would like a call back about her most resent results. And also she states that she feels pressure when she urinate.

## 2017-07-13 LAB — BACTERIA UR CULT: NORMAL

## 2017-10-23 DIAGNOSIS — F41.8 DEPRESSION WITH ANXIETY: ICD-10-CM

## 2017-10-23 RX ORDER — VENLAFAXINE HYDROCHLORIDE 37.5 MG/1
37.5 CAPSULE, EXTENDED RELEASE ORAL DAILY
Qty: 90 CAP | Refills: 0 | Status: SHIPPED | OUTPATIENT
Start: 2017-10-23 | End: 2018-02-16 | Stop reason: DRUGHIGH

## 2017-10-23 NOTE — TELEPHONE ENCOUNTER
Patient notified of prescription refill sent to pharmacy on file. Patient also notified of need for follow up appointment on meds for February upon pcp return from maternity leave. Patient verbalized understanding. Alan Loza

## 2018-02-16 ENCOUNTER — OFFICE VISIT (OUTPATIENT)
Dept: FAMILY MEDICINE CLINIC | Age: 33
End: 2018-02-16

## 2018-02-16 VITALS
RESPIRATION RATE: 16 BRPM | HEART RATE: 93 BPM | OXYGEN SATURATION: 100 % | WEIGHT: 198.8 LBS | SYSTOLIC BLOOD PRESSURE: 117 MMHG | HEIGHT: 67 IN | BODY MASS INDEX: 31.2 KG/M2 | DIASTOLIC BLOOD PRESSURE: 59 MMHG | TEMPERATURE: 98.6 F

## 2018-02-16 DIAGNOSIS — E66.9 OBESITY (BMI 30-39.9): ICD-10-CM

## 2018-02-16 DIAGNOSIS — R73.03 PREDIABETES: ICD-10-CM

## 2018-02-16 DIAGNOSIS — D50.9 IRON DEFICIENCY ANEMIA, UNSPECIFIED IRON DEFICIENCY ANEMIA TYPE: ICD-10-CM

## 2018-02-16 DIAGNOSIS — E55.9 VITAMIN D DEFICIENCY: ICD-10-CM

## 2018-02-16 DIAGNOSIS — F41.8 DEPRESSION WITH ANXIETY: Primary | ICD-10-CM

## 2018-02-16 LAB — HBA1C MFR BLD HPLC: 5.1 %

## 2018-02-16 RX ORDER — CLINDAMYCIN PHOSPHATE 20 MG/G
CREAM VAGINAL
COMMUNITY
Start: 2018-02-05 | End: 2018-02-16

## 2018-02-16 RX ORDER — VENLAFAXINE HYDROCHLORIDE 75 MG/1
75 CAPSULE, EXTENDED RELEASE ORAL DAILY
Qty: 30 CAP | Refills: 2 | Status: SHIPPED | OUTPATIENT
Start: 2018-02-16 | End: 2018-03-26 | Stop reason: SDUPTHER

## 2018-02-16 NOTE — MR AVS SNAPSHOT
[facility] 
 
 
 [address] [facility] [address] [id number] [phone] Patient: Kaycee Gellre MRN: [id number] :1985 Visit Information Date & Time Provider Department Dept. Phone Encounter #  
 2018  3:00 PM Henry Grace NP [facility] [phone] [id number] Follow-up Instructions Return in about 4 weeks (around 3/16/2018) for CPE/lab review. Upcoming Health Maintenance Date Due  
 PAP AKA CERVICAL CYTOLOGY 2018 DTaP/Tdap/Td series (2 - Td) 2027 Allergies as of 2018  Review Complete On: 2018 By: Elena Carlson LPN No Known Allergies Current Immunizations  Never Reviewed No immunizations on file. Not reviewed this visit You Were Diagnosed With   
  
 Codes Comments Depression with anxiety    -  Primary ICD-10-CM: F41.8 ICD-9-CM: 300.4 Iron deficiency anemia, unspecified iron deficiency anemia type     ICD-10-CM: D50.9 ICD-9-CM: 280.9 Prediabetes     ICD-10-CM: R73.03 
ICD-9-CM: 790.29 Vitamin D deficiency     ICD-10-CM: E55.9 ICD-9-CM: 268.9 Obesity (BMI 30-39. 9)     ICD-10-CM: E66.9 ICD-9-CM: 278.00 Vitals BP Pulse Temp Resp Height(growth percentile) Weight(growth percentile) 117/59 (BP 1 Location: Right arm, BP Patient Position: Sitting) 93 98.6 °F (37 °C) (Oral) 16 5' 7\" (1.702 m) 198 lb 12.8 oz (90.2 kg) LMP SpO2 BMI OB Status Smoking Status 2018 100% 31.14 kg/m2 Having regular periods Never Smoker BMI and BSA Data Body Mass Index Body Surface Area  
 31.14 kg/m 2 2.06 m 2 Preferred Pharmacy Pharmacy Name Phone [facility] Pool [address] , [address] [facility] [phone] Your Updated Medication List  
  
   
This list is accurate as of: 18  3:36 PM.  Always use your most recent med list.  
  
  
  
  
 clindamycin-benzoyl Peroxide 1.2 %(1 % base) -5 % SR topical gel Commonly known as:  DUAC  
  
 tretinoin 0.1 % topical cream  
Commonly known as:  RETIN-A Apply  to affected area two (2) times daily as needed. venlafaxine-SR 75 mg capsule Commonly known as:  EFFEXOR-XR Take 1 Cap by mouth daily. Prescriptions Sent to Pharmacy Refills  
 venlafaxine-SR (EFFEXOR-XR) 75 mg capsule 2 Sig: Take 1 Cap by mouth daily. Class: Normal  
 Pharmacy: Maryanne Esposito [address] [facility], [address] [place] [address] Ph #: [phone] Route: Oral  
  
We Performed the Following AMB POC HEMOGLOBIN A1C [75984 CPT(R)] CBC W/O DIFF [80169 CPT(R)] FERRITIN [06133 CPT(R)] IRON PROFILE [id number] CPT(R)] LIPID PANEL AND CHOL/HDL RATIO [42133 CPT(R)] METABOLIC PANEL, COMPREHENSIVE [52107 CPT(R)] REFERRAL TO BEHAVIORAL HEALTH [REF8 Custom] VITAMIN D, 25 HYDROXY [id number] CPT(R)] Follow-up Instructions Return in about 4 weeks (around 3/16/2018) for CPE/lab review. Referral Information Referral ID Referred By Referred To  
  
 [id number] [place], [address] [place], [address] Phone: [phone] Fax: [phone] Visits Status Start Date End Date 1 New Request 2/16/18 2/16/19 If your referral has a status of pending review or denied, additional information will be sent to support the outcome of this decision. Introducing [facility] & HEALTH SERVICES! Derrell Koch introduces [employer] patient portal. Now you can access parts of your medical record, email your doctor's office, and request medication refills online. 1. In your internet browser, go to [email or web address] 2. Click on the First Time User? Click Here link in the Sign In box. You will see the New Member Sign Up page. 3. Enter your [employer] Access Code exactly as it appears below.  You will not need to use this code after youve completed the sign-up process. If you do not sign up before the expiration date, you must request a new code. · [employer] Access Code: [email or web address] Expires: 5/17/2018  3:02 PM 
 
4. Enter the last four digits of your Social Security Number (xxxx) and Date of Birth (mm/dd/yyyy) as indicated and click Submit. You will be taken to the next sign-up page. 5. Create a [employer] ID. This will be your [employer] login ID and cannot be changed, so think of one that is secure and easy to remember. 6. Create a [employer] password. You can change your password at any time. 7. Enter your Password Reset Question and Answer. This can be used at a later time if you forget your password. 8. Enter your e-mail address. You will receive e-mail notification when new information is available in [address] 9. Click Sign Up. You can now view and download portions of your medical record. 10. Click the Download Summary menu link to download a portable copy of your medical information. If you have questions, please visit the Frequently Asked Questions section of the [employer] website. Remember, [employer] is NOT to be used for urgent needs. For medical emergencies, dial 911. Now available from your iPhone and Android! Please provide this summary of care documentation to your next provider. Your primary care clinician is listed as Jagjit Arredondo. If you have any questions after today's visit, please call [phone].

## 2018-02-16 NOTE — PROGRESS NOTES
HISTORY OF PRESENT ILLNESS  Toro Early is a 27 y.o. female. HPI  Here for medication follow-up. Missed her last regular appt in summer 2017. Was due for repeat labs at that time, but would like to do them today. Anxiety/Depression  Currently taking effexor 37.5mg once daily. Had wanted to be off the medication, but feels like things are really rough right now. PHQ-9 score of 8 today, EMEKA score of 15 (severe anxiety). Has had symptoms since her kids were born; she split with her  shortly after that. She moved, changed job, her dad is legally blind and has moved in. Her stepkids also come frequently. Work is also very stressful, as it has grown a lot. She has financial stressors as well. Some days are better than others. Dad was just diagnosed with prostate CA, and 's job ended. Having marital difficulties. Feels like her anxiety is worse now, especially with all the stressors going on at home and at work. Thinks that she might need something for when her anxiety gets \"out of control. \" [place] seeing a counselor, [place] counselor. Acne  Saw Dr. Gibran Link, who prescribed topical therapy, which is really helping. Preventative Care  Flu shot: already got this season  TDaP: declines  Pap smear: February 2016 with OB/GYN; normal with negative HPV; has appt with OB/GYN Mar 2018  Denies any family hx of breast or colon CA. Healthy Habits  Patient is exercising 0x per week, [place]. Patient endorses unhealthy diet; avoids fatty/greasy foods, sugar-sweetened beverages. Eats a lot of take-out food at work. Has been working on portion control over the past 2 weeks. Denies tobacco use. Social alcohol use. Denies illicit drug use. Sexually active with 1 male partner in last 12 months.; no concern for STIs today.      Past Medical History:   Diagnosis Date    Depression      Past Surgical History:   Procedure Laterality Date    HX GYN  2011     Family History   Problem Relation Age of Onset    Hypertension Mother    Leanor Feli Anxiety Mother     Neuropathy Mother     Elevated Lipids Mother     COPD Father     Glaucoma Father     Depression Father     Prostate Cancer Father     Hypertension Sister     No Known Problems Sister     Lupus Sister     Thyroid Disease Sister      Social History     Social History    Marital status: SINGLE     Spouse name: N/A    Number of children: N/A    Years of education: N/A     Social History Main Topics    Smoking status: Never Smoker    Smokeless tobacco: Never Used    Alcohol use Yes      Comment: occasional    Drug use: No    Sexual activity: Yes     Partners: Male     Birth control/ protection: Surgical     Other Topics Concern    None     Social History Narrative     Patient Active Problem List   Diagnosis Code    Depression with anxiety F41.8    Obesity (BMI 30-39. 9) E66.9    Iron deficiency anemia D50.9    Prediabetes R73.03    Vitamin D deficiency E55.9     Outpatient Encounter Prescriptions as of 2/16/2018   Medication Sig Dispense Refill    venlafaxine-SR (EFFEXOR-XR) 75 mg capsule Take 1 Cap by mouth daily. 30 Cap 2    clindamycin-benzoyl Peroxide (DUAC) 1.2 %(1 % base) -5 % SR topical gel       [DISCONTINUED] clindamycin (CLEOCIN) 2 % vaginal cream       [DISCONTINUED] venlafaxine-SR (EFFEXOR-XR) 37.5 mg capsule Take 1 Cap by mouth daily. 90 Cap 0    tretinoin (RETIN-A) 0.1 % topical cream Apply  to affected area two (2) times daily as needed.  [DISCONTINUED] Iron, Cbn & Gluc-FA-B12-C-DSS (FERRALET 90 DUAL-IRON DELIVERY) 90-1-12-50 mg-mg-mcg-mg tab Take 1 Tab by mouth daily. 30 Tab 3    [DISCONTINUED] ergocalciferol (ERGOCALCIFEROL) 50,000 unit capsule Take 1 Cap by mouth every seven (7) days. 12 Cap 1     No facility-administered encounter medications on file as of 2/16/2018.       Visit Vitals    /59 (BP 1 Location: Right arm, BP Patient Position: Sitting)    Pulse 93    Temp 98.6 °F (37 °C) (Oral)    Resp 16    Ht 5' 7\" (1.702 m)    Wt 198 lb 12.8 oz (90.2 kg)    LMP 02/08/2018    SpO2 100%    BMI 31.14 kg/m2               Review of Systems   Constitutional: Negative for chills, fever and malaise/fatigue. Eyes: Negative for blurred vision and double vision. Respiratory: Negative for cough and shortness of breath. Cardiovascular: Negative for chest pain, palpitations, orthopnea and leg swelling. Neurological: Negative for dizziness and headaches. Psychiatric/Behavioral: Positive for depression. Negative for substance abuse and suicidal ideas. The patient is nervous/anxious. The patient does not have insomnia. Physical Exam   Constitutional: She is oriented to person, place, and time. She appears well-developed and well-nourished. No distress. HENT:   Head: Normocephalic and atraumatic. Cardiovascular: Normal rate, regular rhythm and normal heart sounds. Pulmonary/Chest: Effort normal and breath sounds normal. No respiratory distress. She has no wheezes. Neurological: She is alert and oriented to person, place, and time. Skin: Skin is warm and dry. She is not diaphoretic. Psychiatric: She has a normal mood and affect. Her behavior is normal. Judgment normal.   tearful   Nursing note and vitals reviewed. ASSESSMENT and PLAN    ICD-10-CM ICD-9-CM    1. Depression with anxiety F41.8 300.4 REFERRAL TO BEHAVIORAL HEALTH      venlafaxine-SR [facility] 75 mg capsule   2. Iron deficiency anemia, unspecified iron deficiency anemia type Q46.0 670.2 METABOLIC PANEL, COMPREHENSIVE      CBC W/O DIFF      FERRITIN      IRON PROFILE   3. Prediabetes E71.60 148.13 METABOLIC PANEL, COMPREHENSIVE      AMB POC HEMOGLOBIN A1C   4. Vitamin D deficiency E55.9 268.9 VITAMIN D, 25 HYDROXY   5. Obesity (BMI 30-39. 9) T00.7 028.59 METABOLIC PANEL, COMPREHENSIVE      LIPID PANEL AND CHOL/HDL RATIO Depression/anxiety poorly controlled. Will increase effexor to 75mg daily, and refer to Chyna Gay for counseling/access to resources (lost her counselor after she moved, but thinks it would be helpful). Discussed at length that if her goal is to get off effexor, we can try to do that at some point in the future, but it seems like now isn't the time to do that, with all the stressors in her life. Also encouraged exercise, social support. Will check labs today and have patient return in 1 month for CPE with lab review, along with med follow-up. Follow-up Disposition:  Return in about 4 weeks (around 3/16/2018) for CPE/lab review.    Jeremaih Hassan NP

## 2018-02-18 LAB
25(OH)D3+25(OH)D2 SERPL-MCNC: 17.9 NG/ML (ref 30–100)
ALBUMIN SERPL-MCNC: 3.9 G/DL (ref 3.5–5.5)
ALBUMIN/GLOB SERPL: 1.3 {RATIO} (ref 1.2–2.2)
ALP SERPL-CCNC: 61 IU/L (ref 39–117)
ALT SERPL-CCNC: 17 IU/L (ref 0–32)
AST SERPL-CCNC: 17 IU/L (ref 0–40)
BILIRUB SERPL-MCNC: <0.2 MG/DL (ref 0–1.2)
BUN SERPL-MCNC: 14 MG/DL (ref 6–20)
BUN/CREAT SERPL: 18 (ref 9–23)
CALCIUM SERPL-MCNC: 9 MG/DL (ref 8.7–10.2)
CHLORIDE SERPL-SCNC: 99 MMOL/L (ref 96–106)
CHOLEST SERPL-MCNC: 157 MG/DL (ref 100–199)
CHOLEST/HDLC SERPL: 2.5 RATIO UNITS (ref 0–4.4)
CO2 SERPL-SCNC: 26 MMOL/L (ref 18–29)
CREAT SERPL-MCNC: 0.76 MG/DL (ref 0.57–1)
ERYTHROCYTE [DISTWIDTH] IN BLOOD BY AUTOMATED COUNT: 15.4 % (ref 12.3–15.4)
FERRITIN SERPL-MCNC: 5 NG/ML (ref 15–150)
GFR SERPLBLD CREATININE-BSD FMLA CKD-EPI: 103 ML/MIN/1.73
GFR SERPLBLD CREATININE-BSD FMLA CKD-EPI: 118 ML/MIN/1.73
GLOBULIN SER CALC-MCNC: 3.1 G/DL (ref 1.5–4.5)
GLUCOSE SERPL-MCNC: 84 MG/DL (ref 65–99)
HCT VFR BLD AUTO: 31.2 % (ref 34–46.6)
HDLC SERPL-MCNC: 62 MG/DL
HGB BLD-MCNC: 9.3 G/DL (ref 11.1–15.9)
INTERPRETATION, 910389: NORMAL
IRON SATN MFR SERPL: 3 % (ref 15–55)
IRON SERPL-MCNC: 14 UG/DL (ref 27–159)
LDLC SERPL CALC-MCNC: 84 MG/DL (ref 0–99)
MCH RBC QN AUTO: 22.1 PG (ref 26.6–33)
MCHC RBC AUTO-ENTMCNC: 29.8 G/DL (ref 31.5–35.7)
MCV RBC AUTO: 74 FL (ref 79–97)
PLATELET # BLD AUTO: 387 X10E3/UL (ref 150–379)
POTASSIUM SERPL-SCNC: 4.3 MMOL/L (ref 3.5–5.2)
PROT SERPL-MCNC: 7 G/DL (ref 6–8.5)
RBC # BLD AUTO: 4.2 X10E6/UL (ref 3.77–5.28)
SODIUM SERPL-SCNC: 140 MMOL/L (ref 134–144)
TIBC SERPL-MCNC: 417 UG/DL (ref 250–450)
TRIGL SERPL-MCNC: 55 MG/DL (ref 0–149)
UIBC SERPL-MCNC: 403 UG/DL (ref 131–425)
VLDLC SERPL CALC-MCNC: 11 MG/DL (ref 5–40)
WBC # BLD AUTO: 6 X10E3/UL (ref 3.4–10.8)

## 2018-02-19 ENCOUNTER — TELEPHONE (OUTPATIENT)
Dept: FAMILY MEDICINE CLINIC | Age: 33
End: 2018-02-19

## 2018-02-19 NOTE — TELEPHONE ENCOUNTER
Message left on patient voice mail requesting a return call regarding lab results and low iron. Patient needs to start iron supplement OTC.

## 2018-02-19 NOTE — TELEPHONE ENCOUNTER
----- Message from Tigist Bowling NP sent at 2/19/2018  7:33 AM EST -----  Please call patient and have her start an OTC iron supplement (ferrous sulfate 325mg) twice daily right away. Her iron is VERY low, which is making her hemoglobin low, which is likely contributing to her fatigue. We should definitely plan to recheck when she comes back next month.   Thanks -  CAB

## 2018-02-19 NOTE — PROGRESS NOTES
Please call patient and have her start an OTC iron supplement (ferrous sulfate 325mg) twice daily right away. Her iron is VERY low, which is making her hemoglobin low, which is likely contributing to her fatigue. We should definitely plan to recheck when she comes back next month.   Thanks -  CAB

## 2018-02-20 NOTE — TELEPHONE ENCOUNTER
Patient notified of iron level and to start ferrous sulfate 325 mg one tablet twice daily. This can purchased OTC. Will recheck blood work at next visit. Patient verbalized understanding.

## 2018-03-26 ENCOUNTER — OFFICE VISIT (OUTPATIENT)
Dept: FAMILY MEDICINE CLINIC | Age: 33
End: 2018-03-26

## 2018-03-26 VITALS
HEIGHT: 67 IN | SYSTOLIC BLOOD PRESSURE: 102 MMHG | TEMPERATURE: 99 F | DIASTOLIC BLOOD PRESSURE: 58 MMHG | RESPIRATION RATE: 16 BRPM | HEART RATE: 78 BPM | BODY MASS INDEX: 31.14 KG/M2 | WEIGHT: 198.4 LBS | OXYGEN SATURATION: 98 %

## 2018-03-26 DIAGNOSIS — E66.9 OBESITY (BMI 30-39.9): ICD-10-CM

## 2018-03-26 DIAGNOSIS — F32.1 MODERATE MAJOR DEPRESSION (HCC): ICD-10-CM

## 2018-03-26 DIAGNOSIS — Z00.00 PHYSICAL EXAM, ANNUAL: Primary | ICD-10-CM

## 2018-03-26 DIAGNOSIS — B96.89 BACTERIAL VAGINOSIS: ICD-10-CM

## 2018-03-26 DIAGNOSIS — E55.9 VITAMIN D DEFICIENCY: ICD-10-CM

## 2018-03-26 DIAGNOSIS — N76.0 BACTERIAL VAGINOSIS: ICD-10-CM

## 2018-03-26 DIAGNOSIS — F41.8 DEPRESSION WITH ANXIETY: ICD-10-CM

## 2018-03-26 DIAGNOSIS — D50.9 IRON DEFICIENCY ANEMIA, UNSPECIFIED IRON DEFICIENCY ANEMIA TYPE: ICD-10-CM

## 2018-03-26 RX ORDER — ACETAMINOPHEN 500 MG
2000 TABLET ORAL DAILY
Qty: 30 CAP | Refills: 11 | Status: SHIPPED | OUTPATIENT
Start: 2018-03-26 | End: 2021-04-03

## 2018-03-26 RX ORDER — VENLAFAXINE HYDROCHLORIDE 75 MG/1
75 CAPSULE, EXTENDED RELEASE ORAL DAILY
Qty: 30 CAP | Refills: 3 | Status: SHIPPED | OUTPATIENT
Start: 2018-03-26 | End: 2018-10-16 | Stop reason: SDUPTHER

## 2018-03-26 NOTE — PROGRESS NOTES
HISTORY OF PRESENT ILLNESS  Cristina Wooten is a 27 y.o. female. HPI  Here for CPE and med follow-up. Thinks she may have a bacterial vaginal infection. Saw OB/GYN in early February, was Rx'ed clindamycin capsules but only for 3 days. Still having itching. Has also tried Monistat. Having brownish discharge with an \"unpleasant odor. \"     Anxiety/Depression  Currently taking effexor 75mg once daily, increased at her last visit 6 weeks ago. Had wanted to be off the medication, but feels like things are really rough right now. 2/16/18:  PHQ-9 score of 8 today, EMEKA score of 15 (severe anxiety). Has had symptoms since her kids were born; she split with her  shortly after that. She moved, changed job, her dad is legally blind and has moved in. Her stepkids also come frequently. Work is also very stressful, as it has grown a lot. She has financial stressors as well. Some days are better than others. Dad was just diagnosed with prostate CA, and 's job ended. Having marital difficulties. Feels like her anxiety is worse now, especially with all the stressors going on at home and at work. Thinks that she might need something for when her anxiety gets \"out of control. \" [place] seeing a counselor, [place] counselor. Update 3/26/18: Thinks that the dose of effexor is effective; sees significant improvement since dose was increased 6 weeks ago. Feels much better today. Going to see a counselor early next month. PHQ-9 score of 5 today, EMEKA score of 10. Doesn't think the dose needs to be increased. Iron deficiency anemia  Was found on routine labs Feb 2018. Taking iron once daily at this point; difficult on her stomach, but taking daily. Acne  Saw Dr. Claire Olivas, who prescribed topical therapy, which is really helping.      Preventative Care  Flu shot: already got this season  TDaP: declines  Pap smear: February 2016 with OB/GYN; normal with negative HPV; has appt with OB/GYN Mar 2018  Denies any family hx of breast or colon CA. Healthy Habits  Patient is exercising 0x per week, occasionally walks around her [place] complex. Patient endorses unhealthy diet; avoids fatty/greasy foods, sugar-sweetened beverages. Eats a lot of take-out food at work. Has been working on portion control over the past few weeks. Denies tobacco use. Social alcohol use. Denies illicit drug use. Sexually active with 1 male partner in last 12 months.; no concern for STIs today. Hx tubal ligation. Lab Review  Normals: CMP, HgA1C, lipid panel  Abnormals: vit D (17.9), CBC (Hgb 9.3, ), ferritin (5)    Past Medical History:   Diagnosis Date    Anemia     iron deficiency anemia    Depression      Past Surgical History:   Procedure Laterality Date    HX GYN  2011     Family History   Problem Relation Age of Onset    Hypertension Mother    [facility] Anxiety Mother     Neuropathy Mother     Elevated Lipids Mother     COPD Father     Glaucoma Father     Depression Father     Prostate Cancer Father     Hypertension Sister     No Known Problems Sister     Lupus Sister     Thyroid Disease Sister      Social History     Social History    Marital status: SINGLE     Spouse name: N/A    Number of children: N/A    Years of education: N/A     Social History Main Topics    Smoking status: Never Smoker    Smokeless tobacco: Never Used    Alcohol use Yes      Comment: occasional    Drug use: No    Sexual activity: Yes     Partners: Male     Birth control/ protection: Surgical     Other Topics Concern    None     Social History Narrative     Patient Active Problem List   Diagnosis Code    Depression with anxiety F41.8    Obesity (BMI 30-39. 9) E66.9    Iron deficiency anemia D50.9    Prediabetes R73.03    Vitamin D deficiency E55.9    Moderate major depression ([facility] [address] F32.1     Outpatient Encounter Prescriptions as of 3/26/2018   Medication Sig Dispense Refill  Ferrous Sulfate (SLOW FE) 47.5 mg iron TbER tablet Take 1 Tab by mouth daily. Indications: Iron Deficiency Anemia 30 Tab 2    Cholecalciferol, Vitamin D3, (VITAMIN D3) 2,000 unit cap capsule Take 2,000 Units by mouth daily. 30 Cap 11    venlafaxine-SR (EFFEXOR-XR) 75 mg capsule Take 1 Cap by mouth daily. 30 Cap 3    clindamycin (CLEOCIN) 100 mg vaginal suppository Insert 1 Suppository into vagina nightly for 7 days. 7 Suppository 0    clindamycin-benzoyl Peroxide (DUAC) 1.2 %(1 % base) -5 % SR topical gel       tretinoin (RETIN-A) 0.1 % topical cream Apply  to affected area two (2) times daily as needed.  [DISCONTINUED] venlafaxine-SR (EFFEXOR-XR) 75 mg capsule Take 1 Cap by mouth daily. 30 Cap 2     No facility-administered encounter medications on file as of 3/26/2018. Visit Vitals    /58 (BP 1 Location: Right arm, BP Patient Position: Sitting)    Pulse 78    Temp 99 °F (37.2 °C) (Oral)    Resp 16    Ht 5' 7\" (1.702 m)    Wt 198 lb 6.4 oz (90 kg)    LMP 03/12/2018 (Approximate)    SpO2 98%    BMI 31.07 kg/m2         Review of Systems   Constitutional: Negative for chills, diaphoresis, fever, malaise/fatigue and weight loss. HENT: Negative for congestion, ear pain, hearing loss, sinus pain and sore throat. Eyes: Negative for blurred vision, double vision and photophobia. Respiratory: Negative for cough, sputum production, shortness of breath and wheezing. Cardiovascular: Negative for chest pain, palpitations, orthopnea and leg swelling. Gastrointestinal: Negative for abdominal pain, blood in stool, constipation, diarrhea, heartburn, melena, nausea and vomiting. Genitourinary: Negative for dysuria, frequency, hematuria and urgency. Musculoskeletal: Negative for falls and myalgias. Skin: Positive for itching. Negative for rash. Neurological: Negative for dizziness, tingling, sensory change, speech change, focal weakness, weakness and headaches. Endo/Heme/Allergies: Negative for environmental allergies and polydipsia. Does not bruise/bleed easily. Psychiatric/Behavioral: Negative for depression, substance abuse and suicidal ideas. The patient does not have insomnia. Physical Exam   Constitutional: She is oriented to person, place, and time. She appears well-developed and well-nourished. No distress. HENT:   Head: Normocephalic and atraumatic. Right Ear: External ear normal.   Left Ear: External ear normal.   Nose: Nose normal.   Mouth/Throat: Oropharynx is clear and moist. No oropharyngeal exudate. Eyes: Conjunctivae and EOM are normal. Pupils are equal, round, and reactive to light. Right eye exhibits no discharge. Left eye exhibits no discharge. Neck: Normal range of motion. Neck supple. No thyromegaly present. Cardiovascular: Normal rate, regular rhythm, normal heart sounds and intact distal pulses. No murmur heard. Pulmonary/Chest: Effort normal and breath sounds normal. No respiratory distress. She has no wheezes. Abdominal: Soft. Bowel sounds are normal. She exhibits no distension and no mass. There is no tenderness. There is no rebound and no guarding. Genitourinary:   Genitourinary Comments: Breast exam declined as patient seeing gyn next week  Whitish-grey vaginal discharge, + whiff test   Musculoskeletal: Normal range of motion. Lymphadenopathy:     She has no cervical adenopathy. Neurological: She is alert and oriented to person, place, and time. She has normal reflexes. No cranial nerve deficit. Skin: Skin is warm and dry. She is not diaphoretic. Psychiatric: She has a normal mood and affect. Her behavior is normal. Judgment normal.   Nursing note and vitals reviewed. ASSESSMENT and PLAN    ICD-10-CM ICD-9-CM    1. Physical exam, annual Z00.00 V70.0    2.  Iron deficiency anemia, unspecified iron deficiency anemia type D50.9 280.9 Ferrous Sulfate (SLOW FE) 47.5 mg iron TbER tablet      CBC WITH AUTOMATED DIFF   3. Vitamin D deficiency E55.9 268.9 Cholecalciferol, Vitamin D3, (VITAMIN D3) 2,000 unit cap capsule   4. Moderate major depression (HCC) F32.1 296.22    5. Obesity (BMI 30-39. 9) E66.9 278.00    6. Depression with anxiety F41.8 300.4 venlafaxine-SR (EFFEXOR-XR) 75 mg capsule   7. Bacterial vaginosis N76.0 616.10 clindamycin (CLEOCIN) 100 mg vaginal suppository    B96.89 041.9      Normal CPE today; breast exam declined as she is seeing gyn next week. Labs reviewed at length and overall look great. Start vit D 2000 units once daily. Switch to slow-Fe today for improved GI tolerability. Check repeat CBC today. Advised patient to discuss with OB/GYN Cali Casas) next week as well. Depression significantly improved on effexor 75mg once daily; continue. Rx for clindamycin for BV. Return in 3 months for lab/med follow-up, or sooner if needed based on lab results. Follow-up Disposition:  Return in about 3 months (around 6/26/2018) for med f/u.    Lizzeth Evangelista NP

## 2018-03-26 NOTE — PATIENT INSTRUCTIONS

## 2018-03-26 NOTE — MR AVS SNAPSHOT
[facility] 
 
 
 [address] [facility] [id number] [phone] Patient: Ned Hunter MRN: [id number] :1985 Visit Information Date & Time Provider Department Dept. Phone Encounter #  
 3/26/2018  3:30 PM Yuri Howell NP [facility] [phone] [id number] Follow-up Instructions Return in about 3 months (around 2018) for med f/u. Upcoming Health Maintenance Date Due  
 PAP AKA CERVICAL CYTOLOGY 2018 DTaP/Tdap/Td series (2 - Td) 2027 Allergies as of 3/26/2018  Review Complete On: 3/26/2018 By: Yuri Howell NP No Known Allergies Current Immunizations  Never Reviewed No immunizations on file. Not reviewed this visit You Were Diagnosed With   
  
 Codes Comments Physical exam, annual    -  Primary ICD-10-CM: Z00.00 ICD-9-CM: V70.0 Iron deficiency anemia, unspecified iron deficiency anemia type     ICD-10-CM: D50.9 ICD-9-CM: 280.9 Vitamin D deficiency     ICD-10-CM: E55.9 ICD-9-CM: 268.9 Moderate major depression (HCC)     ICD-10-CM: F32.1 ICD-9-CM: 296.22 Obesity (BMI 30-39. 9)     ICD-10-CM: E66.9 ICD-9-CM: 278.00 Depression with anxiety     ICD-10-CM: F41.8 ICD-9-CM: 300.4 Bacterial vaginosis     ICD-10-CM: N76.0, B96.89 
ICD-9-CM: 616.10, 041.9 Vitals BP Pulse Temp Resp Height(growth percentile) Weight(growth percentile) 102/58 (BP 1 Location: Right arm, BP Patient Position: Sitting) 78 99 °F (37.2 °C) (Oral) 16 5' 7\" (1.702 m) 198 lb 6.4 oz (90 kg) LMP SpO2 BMI OB Status Smoking Status 2018 (Approximate) 98% 31.07 kg/m2 Having regular periods Never Smoker BMI and BSA Data Body Mass Index Body Surface Area 31.07 kg/m 2 2.06 m 2 Preferred Pharmacy Pharmacy Name Phone [facility] [address], [address] [facility] [phone] Your Updated Medication List  
  
 This list is accurate as of 3/26/18  4:10 PM.  Always use your most recent med list.  
  
  
  
  
 Cholecalciferol (Vitamin D3) 2,000 unit Cap capsule Commonly known as:  VITAMIN D3 Take 2,000 Units by mouth daily. clindamycin 100 mg vaginal suppository Commonly known as:  CLEOCIN Insert 1 Suppository into vagina nightly for 7 days. clindamycin-benzoyl Peroxide 1.2 %(1 % base) -5 % SR topical gel Commonly known as:  DUAC Ferrous Sulfate 47.5 mg iron Tber tablet Commonly known as:  SLOW FE Take 1 Tab by mouth daily. Indications: Iron Deficiency Anemia  
  
 tretinoin 0.1 % topical cream  
Commonly known as:  RETIN-A Apply  to affected area two (2) times daily as needed. venlafaxine-SR 75 mg capsule Commonly known as:  EFFEXOR-XR Take 1 Cap by mouth daily. Prescriptions Sent to Pharmacy Refills Ferrous Sulfate (SLOW FE) 47.5 mg iron TbER tablet 2 Sig: Take 1 Tab by mouth daily. Indications: Iron Deficiency Anemia Class: Normal  
 Pharmacy: [address] Ph #: [phone] Route: Oral  
 Cholecalciferol, Vitamin D3, (VITAMIN D3) 2,000 unit cap capsule 11 Sig: Take 2,000 Units by mouth daily. Class: Normal  
 Pharmacy: [address] Ph #: [phone] Route: Oral  
 venlafaxine-SR (EFFEXOR-XR) 75 mg capsule 3 Sig: Take 1 Cap by mouth daily. Class: Normal  
 Pharmacy: [address] Ph #: [phone] Route: Oral  
 clindamycin (CLEOCIN) 100 mg vaginal suppository 0 Sig: Insert 1 Suppository into vagina nightly for 7 days. Class: Normal  
 Pharmacy: [address] Ph #: [phone] Route: Vaginal  
  
We Performed the Following CBC WITH AUTOMATED DIFF [35376 CPT(R)] Follow-up Instructions Return in about 3 months (around 6/26/2018) for med f/u. Patient Instructions Well Visit, Ages 25 to 48: Care Instructions Your Care Instructions Physical exams can help you stay healthy. Your doctor has checked your overall health and may have suggested ways to take good care of yourself. He or she also may have recommended tests. At home, you can help prevent illness with healthy eating, regular exercise, and other steps. Follow-up care is a key part of your treatment and safety. Be sure to make and go to all appointments, and call your doctor if you are having problems. It's also a good idea to know your test results and keep a list of the medicines you take. How can you care for yourself at home? · Reach and stay at a healthy weight. This will lower your risk for many problems, such as obesity, diabetes, heart disease, and high blood pressure. · Get at least 30 minutes of physical activity on most days of the week. Walking is a good choice. You also may want to do other activities, such as running, swimming, cycling, or playing tennis or team sports. Discuss any changes in your exercise program with your doctor. · Do not smoke or allow others to smoke around you. If you need help quitting, talk to your doctor about stop-smoking programs and medicines. These can increase your chances of quitting for good. · Talk to your doctor about whether you have any risk factors for sexually transmitted infections (STIs). Having one sex partner (who does not have STIs and does not have sex with anyone else) is a good way to avoid these infections. · Use birth control if you do not want to have children at this time. Talk with your doctor about the choices available and what might be best for you. · Protect your skin from too much sun.  When you're outdoors from 10 a.m. to 4 p.m., stay in the shade or cover up with clothing and a hat with a wide brim. Wear sunglasses that block UV rays. Even when it's cloudy, put broad-spectrum sunscreen (SPF 30 or higher) on any exposed skin. · See a dentist one or two times a year for checkups and to have your teeth cleaned. · Wear a seat belt in the car. · Drink alcohol in moderation, if at all. That means no more than 2 drinks a day for men and 1 drink a day for women. Follow your doctor's advice about when to have certain tests. These tests can spot problems early. For everyone · Cholesterol. Have the fat (cholesterol) in your blood tested after age 21. Your doctor will tell you how often to have this done based on your age, family history, or other things that can increase your risk for heart disease. · Blood pressure. Have your blood pressure checked during a routine doctor visit. Your doctor will tell you how often to check your blood pressure based on your age, your blood pressure results, and other factors. · Vision. Talk with your doctor about how often to have a glaucoma test. 
· Diabetes. Ask your doctor whether you should have tests for diabetes. · Colon cancer. Have a test for colon cancer at age 48. You may have one of several tests. If you are younger than 48, you may need a test earlier if you have any risk factors. Risk factors include whether you already had a precancerous polyp removed from your colon or whether your parent, brother, sister, or child has had colon cancer. For women · Breast exam and mammogram. Talk to your doctor about when you should have a clinical breast exam and a mammogram. Medical experts differ on whether and how often women under 50 should have these tests. Your doctor can help you decide what is right for you. · Pap test and pelvic exam. Begin Pap tests at age 24. A Pap test is the best way to find cervical cancer.  The test often is part of a pelvic exam. Ask how often to have this test. 
 · Tests for sexually transmitted infections (STIs). Ask whether you should have tests for STIs. You may be at risk if you have sex with more than one person, especially if your partners do not wear condoms. For men · Tests for sexually transmitted infections (STIs). Ask whether you should have tests for STIs. You may be at risk if you have sex with more than one person, especially if you do not wear a condom. · Testicular cancer exam. Ask your doctor whether you should check your testicles regularly. · Prostate exam. Talk to your doctor about whether you should have a blood test (called a PSA test) for prostate cancer. Experts differ on whether and when men should have this test. Some experts suggest it if you are older than 39 and are -American or have a father or brother who got prostate cancer when he was younger than 72. When should you call for help? Watch closely for changes in your health, and be sure to contact your doctor if you have any problems or symptoms that concern you. Where can you learn more? Go to http://mitch-radha.info/. Enter P072 in the search box to learn more about \"Well Visit, Ages 25 to 48: Care Instructions. \" Current as of: May 12, 2017 Content Version: 11.4 © 8526-0450 Healthwise, [employer] Care instructions adapted under license by [employer] (which disclaims liability or warranty for this information). If you have questions about a medical condition or this instruction, always ask your healthcare professional. [place] [address] any warranty or liability for your use of this information. Introducing hospitals & HEALTH SERVICES! Sharri Dominguez introduces [employer] patient portal. Now you can access parts of your medical record, email your doctor's office, and request medication refills online. 1. In your internet browser, go to [email or web address] 2. Click on the First Time User? Click Here link in the Sign In box. You will see the New Member Sign Up page. 3. Enter your HedgeChatter Access Code exactly as it appears below. You will not need to use this code after youve completed the sign-up process. If you do not sign up before the expiration date, you must request a new code. · HedgeChatter Access Code: [email or web address] Expires: 5/17/2018  4:02 PM 
 
4. Enter the last four digits of your Social Security Number (xxxx) and Date of Birth (mm/dd/yyyy) as indicated and click Submit. You will be taken to the next sign-up page. 5. Create a HedgeChatter ID. This will be your HedgeChatter login ID and cannot be changed, so think of one that is secure and easy to remember. 6. Create a HedgeChatter password. You can change your password at any time. 7. Enter your Password Reset Question and Answer. This can be used at a later time if you forget your password. 8. Enter your e-mail address. You will receive e-mail notification when new information is available in [address] 9. Click Sign Up. You can now view and download portions of your medical record. 10. Click the Download Summary menu link to download a portable copy of your medical information. If you have questions, please visit the Frequently Asked Questions section of the HedgeChatter website. Remember, HedgeChatter is NOT to be used for urgent needs. For medical emergencies, dial 911. Now available from your iPhone and Android! Please provide this summary of care documentation to your next provider. Your primary care clinician is listed as Da Coburn. If you have any questions after today's visit, please call [phone].

## 2018-03-26 NOTE — PROGRESS NOTES
Chief Complaint   Patient presents with    Complete Physical     Patient here for cpe and follow up on meds. Patient sees gyn next week and last eye  Appointment was in Jan/2018. Patient does request medicine for vaginal itching.

## 2018-03-27 ENCOUNTER — TELEPHONE (OUTPATIENT)
Dept: FAMILY MEDICINE CLINIC | Age: 33
End: 2018-03-27

## 2018-03-27 LAB
BASOPHILS # BLD AUTO: 0 X10E3/UL (ref 0–0.2)
BASOPHILS NFR BLD AUTO: 1 %
EOSINOPHIL # BLD AUTO: 0.2 X10E3/UL (ref 0–0.4)
EOSINOPHIL NFR BLD AUTO: 3 %
ERYTHROCYTE [DISTWIDTH] IN BLOOD BY AUTOMATED COUNT: 19.5 % (ref 12.3–15.4)
HCT VFR BLD AUTO: 35.8 % (ref 34–46.6)
HGB BLD-MCNC: 10.7 G/DL (ref 11.1–15.9)
IMM GRANULOCYTES # BLD: 0 X10E3/UL (ref 0–0.1)
IMM GRANULOCYTES NFR BLD: 0 %
LYMPHOCYTES # BLD AUTO: 2.1 X10E3/UL (ref 0.7–3.1)
LYMPHOCYTES NFR BLD AUTO: 31 %
MCH RBC QN AUTO: 23.5 PG (ref 26.6–33)
MCHC RBC AUTO-ENTMCNC: 29.9 G/DL (ref 31.5–35.7)
MCV RBC AUTO: 79 FL (ref 79–97)
MONOCYTES # BLD AUTO: 0.6 X10E3/UL (ref 0.1–0.9)
MONOCYTES NFR BLD AUTO: 9 %
NEUTROPHILS # BLD AUTO: 3.9 X10E3/UL (ref 1.4–7)
NEUTROPHILS NFR BLD AUTO: 56 %
PLATELET # BLD AUTO: 290 X10E3/UL (ref 150–379)
RBC # BLD AUTO: 4.56 X10E6/UL (ref 3.77–5.28)
WBC # BLD AUTO: 6.8 X10E3/UL (ref 3.4–10.8)

## 2018-03-27 NOTE — TELEPHONE ENCOUNTER
Called and spoke to patient to inform her of improving CBC results. No need to repeat labs until June appt, continue iron supplements. Patient verbalized understanding.   Da Coburn NP

## 2018-04-06 ENCOUNTER — DOCUMENTATION ONLY (OUTPATIENT)
Dept: FAMILY MEDICINE CLINIC | Age: 33
End: 2018-04-06

## 2018-04-06 NOTE — PROGRESS NOTES
Records received from [facility]. Pap smear done 3/21/17. NIL, negative HPV. Will send to scanning.

## 2018-04-23 ENCOUNTER — DOCUMENTATION ONLY (OUTPATIENT)
Dept: FAMILY MEDICINE CLINIC | Age: 33
End: 2018-04-23

## 2018-06-15 RX ORDER — CLINDAMYCIN PHOSPHATE AND BENZOYL PEROXIDE 10; 50 MG/G; MG/G
GEL TOPICAL
Qty: 1 TUBE | Refills: 0 | Status: SHIPPED | OUTPATIENT
Start: 2018-06-15 | End: 2018-11-27 | Stop reason: SDUPTHER

## 2018-06-15 RX ORDER — TRETINOIN 1 MG/G
CREAM TOPICAL
Qty: 20 G | Refills: 0 | Status: SHIPPED | OUTPATIENT
Start: 2018-06-15 | End: 2021-04-08

## 2018-06-15 NOTE — TELEPHONE ENCOUNTER
Patient contacted office requesting a refill on acne medications DUAC and Retin-A. Dermatology will not refill without an office visit per patient and that copay is fifty dollars and patient does not want to pay that amount but if required by pcp will schedule office visit for refills. Patient notified med refill request will be forwarded to pcp for review.

## 2018-10-16 DIAGNOSIS — F41.8 DEPRESSION WITH ANXIETY: ICD-10-CM

## 2018-10-16 RX ORDER — VENLAFAXINE HYDROCHLORIDE 75 MG/1
75 CAPSULE, EXTENDED RELEASE ORAL DAILY
Qty: 30 CAP | Refills: 0 | Status: SHIPPED | OUTPATIENT
Start: 2018-10-16 | End: 2018-11-21 | Stop reason: SDUPTHER

## 2018-10-16 NOTE — TELEPHONE ENCOUNTER
Last Visit: 3/26  Next Appt: no show-6/28  Previous Refill Encounter: 3/26-30+3    Requested Prescriptions     Pending Prescriptions Disp Refills    venlafaxine-SR (EFFEXOR-XR) 75 mg capsule 30 Cap 3     Sig: Take 1 Cap by mouth daily.

## 2018-11-21 ENCOUNTER — TELEPHONE (OUTPATIENT)
Dept: FAMILY MEDICINE CLINIC | Age: 33
End: 2018-11-21

## 2018-11-21 DIAGNOSIS — F41.8 DEPRESSION WITH ANXIETY: ICD-10-CM

## 2018-11-21 NOTE — TELEPHONE ENCOUNTER
Last Visit: 3/26  Next Appt: no show-6/28  Previous Refill Encounter: 10/16-30+0    Requested Prescriptions     Pending Prescriptions Disp Refills    venlafaxine-SR (EFFEXOR-XR) 75 mg capsule 30 Cap 0     Sig: Take 1 Cap by mouth daily. APPT REQUIRED FOR FURTHER REFILLS.

## 2018-11-21 NOTE — TELEPHONE ENCOUNTER
Patient called stating that she scheduled an appointment for 11/27 and would like to know if she would be able to get a refill on venlafaxine-SR [facility] 75 mg capsule a little early because she is currently out. Patient told the medication was pending with Nito Villalobos and Ciara Willson would give her a call on Friday whether or not she would be able to give it to her. Patient can be reached at [phone].

## 2018-11-23 RX ORDER — VENLAFAXINE HYDROCHLORIDE 75 MG/1
CAPSULE, EXTENDED RELEASE ORAL
Qty: 30 CAP | Refills: 0 | Status: SHIPPED | OUTPATIENT
Start: 2018-11-23 | End: 2018-11-27 | Stop reason: SDUPTHER

## 2018-11-23 RX ORDER — VENLAFAXINE HYDROCHLORIDE 75 MG/1
75 CAPSULE, EXTENDED RELEASE ORAL DAILY
Qty: 30 CAP | Refills: 0 | Status: SHIPPED | OUTPATIENT
Start: 2018-11-23 | End: 2018-11-27

## 2018-11-27 ENCOUNTER — OFFICE VISIT (OUTPATIENT)
Dept: FAMILY MEDICINE CLINIC | Age: 33
End: 2018-11-27

## 2018-11-27 VITALS
WEIGHT: 200.6 LBS | TEMPERATURE: 98.8 F | HEART RATE: 98 BPM | SYSTOLIC BLOOD PRESSURE: 114 MMHG | HEIGHT: 67 IN | BODY MASS INDEX: 31.48 KG/M2 | DIASTOLIC BLOOD PRESSURE: 61 MMHG | OXYGEN SATURATION: 98 % | RESPIRATION RATE: 16 BRPM

## 2018-11-27 DIAGNOSIS — E55.9 VITAMIN D DEFICIENCY: ICD-10-CM

## 2018-11-27 DIAGNOSIS — Z87.42 HISTORY OF RECURRENT VAGINAL DISCHARGE: ICD-10-CM

## 2018-11-27 DIAGNOSIS — E66.9 OBESITY (BMI 30-39.9): ICD-10-CM

## 2018-11-27 DIAGNOSIS — R73.03 PREDIABETES: ICD-10-CM

## 2018-11-27 DIAGNOSIS — F32.1 MODERATE MAJOR DEPRESSION (HCC): Primary | ICD-10-CM

## 2018-11-27 DIAGNOSIS — D50.9 IRON DEFICIENCY ANEMIA, UNSPECIFIED IRON DEFICIENCY ANEMIA TYPE: ICD-10-CM

## 2018-11-27 DIAGNOSIS — F41.8 DEPRESSION WITH ANXIETY: ICD-10-CM

## 2018-11-27 DIAGNOSIS — Z00.00 ANNUAL PHYSICAL EXAM: ICD-10-CM

## 2018-11-27 LAB — HBA1C MFR BLD HPLC: 4.9 %

## 2018-11-27 RX ORDER — CLINDAMYCIN PHOSPHATE AND BENZOYL PEROXIDE 10; 50 MG/G; MG/G
GEL TOPICAL
Qty: 1 TUBE | Refills: 2 | Status: SHIPPED | OUTPATIENT
Start: 2018-11-27 | End: 2019-06-07 | Stop reason: SDUPTHER

## 2018-11-27 RX ORDER — CLINDAMYCIN PHOSPHATE AND BENZOYL PEROXIDE 10; 50 MG/G; MG/G
GEL TOPICAL
Qty: 1 TUBE | Refills: 0 | Status: CANCELLED | OUTPATIENT
Start: 2018-11-27

## 2018-11-27 RX ORDER — CLINDAMYCIN PHOSPHATE 20 MG/G
1 CREAM VAGINAL
Qty: 40 G | Refills: 1 | Status: CANCELLED | OUTPATIENT
Start: 2018-11-27

## 2018-11-27 RX ORDER — CLINDAMYCIN PHOSPHATE 20 MG/G
1 CREAM VAGINAL
COMMUNITY
End: 2019-06-07 | Stop reason: ALTCHOICE

## 2018-11-27 RX ORDER — VENLAFAXINE HYDROCHLORIDE 75 MG/1
75 CAPSULE, EXTENDED RELEASE ORAL DAILY
Qty: 30 CAP | Refills: 5 | Status: SHIPPED | OUTPATIENT
Start: 2018-11-27 | End: 2019-06-07 | Stop reason: DRUGHIGH

## 2018-11-27 NOTE — PROGRESS NOTES
Chief Complaint Patient presents with  Follow-up Patient here for routine follow up. No ER visits. Seen by gyn - patient having reoccuring BV episodes per patient.

## 2018-11-27 NOTE — PROGRESS NOTES
HISTORY OF PRESENT ILLNESS Kary Ash is a 26 y.o. female. HPI Here for med follow-up. Has not been seen since March 2018. Weight is stable from her last visit in March 2018. Anxiety/Depression Has been taking 75mg effexor-XR daily for the past 6+ months. Feels like things are going very well. PHQ-2 score of 2 today. Denies SI/HI. Her divorce was finalized in June, and she feels a sense of freedom since that happened. Wants to continue the effexor at the current dose. Iron deficiency anemia Was found on routine labs Feb 2018. Was switched to slow-Fe after having GI distress on regular iron supplements; endorses good adherence to medication regimen. Acne Saw Dr. Cindy Lorenzo, who prescribed topical therapy, which is really helping. Requesting refill on this today. Preventative Care Flu shot: will get at work TDaP: declines Pap smear: March 2017, NILM Denies any family hx of breast or colon CA. Healthy Habits Patient is exercising 3 - 4x per week, doing elliptical and weights. Feeling great. Patient endorses improving diet; working on decreasing sweet intake and more keto-light diet. Denies tobacco use. Social alcohol use. Denies illicit drug use. Sexually active with 1 male partner in last 12 months.; no concern for STIs today. Hx tubal ligation. Has had recurrent BV, for which she is working with Bayron Kay, her GYN. Requesting Rx for probiotic today. Past Medical History:  
Diagnosis Date  Anemia   
 iron deficiency anemia  Depression Past Surgical History:  
Procedure Laterality Date Re[facility] Meyer G[facility]  2011 Family History Problem Relation Age of Onset  Hypertension Mother  Anxiety Mother  Neuropathy Mother  Elevated Lipids Mother  COPD Father  Glaucoma Father  Depression Father  Prostate Cancer Father  Hypertension Sister  No Known Problems Sister  Lupus Sister  Thyroid Disease Sister Social History Socioeconomic History  Marital status: SINGLE Spouse name: Not on file  Number of children: Not on file  Years of education: Not on file  Highest education level: Not on file Tobacco Use  Smoking status: Never Smoker  Smokeless tobacco: Never Used Substance and Sexual Activity  Alcohol use: Yes Comment: occasional  
 Drug use: No  
 Sexual activity: Yes  
  Partners: Male Birth control/protection: Surgical  
 
Patient Active Problem List  
Diagnosis Code  Depression with anxiety F41.8  Obesity (BMI 30-39. 9) E66.9  Iron deficiency anemia D50.9  Prediabetes R73.03  
 Vitamin D deficiency E55.9  Moderate major depression (HCC) F32.1 Outpatient Encounter Medications as of 11/27/2018 Medication Sig Dispense Refill  clindamycin (CLEOCIN) 2 % vaginal cream Insert 1 Applicator into vagina nightly.  venlafaxine-SR (EFFEXOR-XR) 75 mg capsule Take 1 Cap by mouth daily. 30 Cap 5  clindamycin-benzoyl Peroxide (DUAC) 1.2 %(1 % base) -5 % SR topical gel Apply  to affected area nightly. 1 Tube 2  
 lactobacillus rhamnosus gg 15 billion cell (CULTURELLE) 15 billion cell capsule Take 1 Cap by mouth daily. 30 Cap 11  Ferrous Sulfate (SLOW FE) 47.5 mg iron TbER tablet Take 1 Tab by mouth daily. Indications: Iron Deficiency Anemia 30 Tab 2  Cholecalciferol, Vitamin D3, (VITAMIN D3) 2,000 unit cap capsule Take 2,000 Units by mouth daily. 30 Cap 11  
 [DISCONTINUED] venlafaxine-SR (EFFEXOR-XR) 75 mg capsule Take 1 Cap by mouth daily. APPT REQUIRED FOR FURTHER REFILLS. 30 Cap 0  
 [DISCONTINUED] venlafaxine-SR (EFFEXOR-XR) 75 mg capsule TAKE ONE CAPSULE BY MOUTH DAILY 30 Cap 0  
 tretinoin (RETIN-A) 0.1 % topical cream Apply  to affected area two (2) times daily as needed. 20 g 0  
 [DISCONTINUED] clindamycin-benzoyl Peroxide (DUAC) 1.2 %(1 % base) -5 % SR topical gel Apply  to affected area nightly.  1 Tube 0  
 
 No facility-administered encounter medications on file as of 11/27/2018. Visit Vitals /61 (BP 1 Location: Left arm, BP Patient Position: Sitting) Pulse 98 Temp 98.8 °F (37.1 °C) (Oral) Resp 16 Ht 5' 7\" (1.702 m) Wt 200 lb 9.6 oz (91 kg) LMP 11/19/2018 (Approximate) SpO2 98% BMI 31.42 kg/m² Review of Systems Constitutional: Negative for chills, fever and malaise/fatigue. Eyes: Negative for blurred vision and double vision. Respiratory: Negative for cough and shortness of breath. Cardiovascular: Negative for chest pain, palpitations, orthopnea and leg swelling. Genitourinary: Negative for hematuria. Neurological: Negative for dizziness and headaches. Psychiatric/Behavioral: Negative for depression and suicidal ideas. The patient is not nervous/anxious and does not have insomnia. Physical Exam  
Constitutional: She is oriented to person, place, and time. She appears well-developed and well-nourished. No distress. HENT:  
Head: Normocephalic and atraumatic. Mouth/Throat: Oropharynx is clear and moist. No oropharyngeal exudate. Cardiovascular: Normal rate, regular rhythm and normal heart sounds. Pulmonary/Chest: Effort normal and breath sounds normal. No respiratory distress. She has no wheezes. Neurological: She is alert and oriented to person, place, and time. Skin: Skin is warm and dry. She is not diaphoretic. Psychiatric: She has a normal mood and affect. Her behavior is normal. Judgment normal.  
Nursing note and vitals reviewed. ASSESSMENT and PLAN 
  ICD-10-CM ICD-9-CM 1. Moderate major depression (HCC) F32.1 296.22   
2. Iron deficiency anemia, unspecified iron deficiency anemia type D50.9 280.9 CBC W/O DIFF [address] 3. Prediabetes R73.03 790.29 AMB POC HEMOGLOBIN A1C  
4. Vitamin D deficiency E55.9 268.9 VITAMIN D, 25 HYDROXY 5. Obesity (BMI 30-39. 9) E66.9 278.00   
 6. Depression with anxiety F41.8 300.4 venlafaxine-SR (EFFEXOR-XR) 75 mg capsule 7. History of recurrent vaginal discharge Z87.42 V13.29 lactobacillus rhamnosus gg 15 billion cell (CULTURELLE) 15 billion cell capsule 1. Depression -- Doing very well on effexor 75mg daily; refills provided. Congratulated patient on positive changes in her life, including increased exercise and attention to diet. 2. SAYDA - Tolerating slow-Fe well. Recheck labs today. 3. Pre-diabetes - Recheck A1C today. I suspect it will be improved with changes in diet/exercise. 4. Recurrent vaginal discharge - I advised her to stop douching and to follow up with her OB/GYN if needed for management of recurrent BV. Probiotic sent to pharmacy on file. Return in 4 months for fasting labs and 1 week later for CPE/lab review, sooner PRN. Follow-up Disposition: 
Return in about 4 months (around 3/27/2019) for CPE with fasting labs 1 week prior, sooner PRN.

## 2018-11-28 LAB
25(OH)D3+25(OH)D2 SERPL-MCNC: 27 NG/ML (ref 30–100)
ERYTHROCYTE [DISTWIDTH] IN BLOOD BY AUTOMATED COUNT: 14.2 % (ref 12.3–15.4)
FERRITIN SERPL-MCNC: 10 NG/ML (ref 15–150)
HCT VFR BLD AUTO: 38 % (ref 34–46.6)
HGB BLD-MCNC: 11.8 G/DL (ref 11.1–15.9)
IRON SATN MFR SERPL: 10 % (ref 15–55)
IRON SERPL-MCNC: 39 UG/DL (ref 27–159)
MCH RBC QN AUTO: 26 PG (ref 26.6–33)
MCHC RBC AUTO-ENTMCNC: 31.1 G/DL (ref 31.5–35.7)
MCV RBC AUTO: 84 FL (ref 79–97)
PLATELET # BLD AUTO: 307 X10E3/UL (ref 150–379)
RBC # BLD AUTO: 4.53 X10E6/UL (ref 3.77–5.28)
TIBC SERPL-MCNC: 392 UG/DL (ref 250–450)
UIBC SERPL-MCNC: 353 UG/DL (ref 131–425)
WBC # BLD AUTO: 8.5 X10E3/UL (ref 3.4–10.8)

## 2018-11-29 NOTE — PROGRESS NOTES
Please let patient know that her labs from the other day look great. Her blood counts are improved, though her iron remains a bit on the low side, her hemoglobin is normal. Her vitamin D remains a bit low, so I'd recommend doubling up on her vit D to 4000 units daily. Her blood sugar average has improved significantly, too, likely due to her improved lifestyle habits. Hooray! Thanks! CAB

## 2018-11-30 ENCOUNTER — TELEPHONE (OUTPATIENT)
Dept: FAMILY MEDICINE CLINIC | Age: 33
End: 2018-11-30

## 2018-11-30 NOTE — TELEPHONE ENCOUNTER
----- Message from Saman Fountain NP sent at 11/29/2018  3:16 PM EST -----  Please let patient know that her labs from the other day look great. Her blood counts are improved, though her iron remains a bit on the low side, her hemoglobin is normal. Her vitamin D remains a bit low, so I'd recommend doubling up on her vit D to 4000 units daily. Her blood sugar average has improved significantly, too, likely due to her improved lifestyle habits. Hooray! Thanks!   CAB

## 2019-06-07 ENCOUNTER — OFFICE VISIT (OUTPATIENT)
Dept: FAMILY MEDICINE CLINIC | Age: 34
End: 2019-06-07

## 2019-06-07 VITALS
SYSTOLIC BLOOD PRESSURE: 114 MMHG | HEART RATE: 83 BPM | HEIGHT: 67 IN | WEIGHT: 211.8 LBS | OXYGEN SATURATION: 100 % | DIASTOLIC BLOOD PRESSURE: 73 MMHG | BODY MASS INDEX: 33.24 KG/M2 | RESPIRATION RATE: 14 BRPM | TEMPERATURE: 99 F

## 2019-06-07 DIAGNOSIS — F32.1 MODERATE MAJOR DEPRESSION (HCC): Primary | ICD-10-CM

## 2019-06-07 DIAGNOSIS — E66.9 OBESITY (BMI 30-39.9): ICD-10-CM

## 2019-06-07 RX ORDER — VENLAFAXINE HYDROCHLORIDE 37.5 MG/1
37.5 CAPSULE, EXTENDED RELEASE ORAL DAILY
Qty: 30 CAP | Refills: 0 | Status: SHIPPED | OUTPATIENT
Start: 2019-06-07 | End: 2019-07-01 | Stop reason: SDUPTHER

## 2019-06-07 RX ORDER — CLINDAMYCIN PHOSPHATE AND BENZOYL PEROXIDE 10; 50 MG/G; MG/G
GEL TOPICAL
Qty: 1 TUBE | Refills: 2 | Status: CANCELLED | OUTPATIENT
Start: 2019-06-07

## 2019-06-07 RX ORDER — CLINDAMYCIN PHOSPHATE AND BENZOYL PEROXIDE 10; 50 MG/G; MG/G
GEL TOPICAL
Qty: 1 TUBE | Refills: 2 | Status: SHIPPED | OUTPATIENT
Start: 2019-06-07 | End: 2021-04-03

## 2019-06-07 RX ORDER — VENLAFAXINE HYDROCHLORIDE 75 MG/1
75 CAPSULE, EXTENDED RELEASE ORAL DAILY
Qty: 30 CAP | Refills: 5 | Status: CANCELLED | OUTPATIENT
Start: 2019-06-07

## 2019-06-07 NOTE — PROGRESS NOTES
HISTORY OF PRESENT ILLNESS  Memory Fermin is a 26 y.o. female. HPI  Here for med follow-up. Last seen November 2018, due for CPE in March 2019. Weight is up about 11 lbs since her last visit in November 2018, which she attributes to poor dietary habits. Anxiety/Depression  Has been taking 75mg effexor-XR daily. . Feels like things are going very well. PHQ-2 score of 0 today. Denies SI/HI. Her divorce was finalized in June 2018, and she feels a sense of freedom since that happened. She is here today to discuss coming off the effexor. She reports that things are going very well and she no longer thinks that she needs the effexor. She tried to skip a day or two but had bad side effects so would like to discuss how to do so safely. Iron deficiency anemia  Was found on routine labs Feb 2018. Was switched to slow-Fe after having GI distress on regular iron supplements; endorses good adherence to medication regimen. Hgb WNL November 2018. Acne  Saw Dr. Keshav Rutherford, who prescribed topical therapy, which is really helping. Requesting refill on this today. Preventative Care  TDaP: declines  Pap smear: March 2017, NILM  Denies any family hx of breast or colon CA. Healthy Habits  Patient is exercising 3 - 4x per week, doing elliptical and weights. Feeling great. Patient endorses improving diet; working on decreasing sweet intake and more keto-light diet. Denies tobacco use. Social alcohol use. Denies illicit drug use. Sexually active with 1 male partner in last 12 months.; no concern for STIs today. Hx tubal ligation. LMP 5/20/2019.     Visit Vitals  /73   Pulse 83   Temp 99 °F (37.2 °C) (Oral)   Resp 14   Ht 5' 7\" (1.702 m)   Wt 211 lb 12.8 oz (96.1 kg)   LMP 05/24/2019 (Approximate)   SpO2 100%   BMI 33.17 kg/m²     Current Outpatient Medications on File Prior to Visit   Medication Sig Dispense Refill    lactobacillus rhamnosus gg 15 billion cell (CULTURELLE) 15 billion cell capsule Take 1 Cap by mouth daily. 30 Cap 11    Ferrous Sulfate (SLOW FE) 47.5 mg iron TbER tablet Take 1 Tab by mouth daily. Indications: Iron Deficiency Anemia 30 Tab 2    Cholecalciferol, Vitamin D3, (VITAMIN D3) 2,000 unit cap capsule Take 2,000 Units by mouth daily. 30 Cap 11    tretinoin (RETIN-A) 0.1 % topical cream Apply  to affected area two (2) times daily as needed. 20 g 0     No current facility-administered medications on file prior to visit. Review of Systems   Constitutional: Negative for chills, fever and malaise/fatigue. Eyes: Negative for blurred vision and double vision. Respiratory: Negative for cough and shortness of breath. Cardiovascular: Negative for chest pain, palpitations, orthopnea and leg swelling. Neurological: Negative for dizziness and headaches. Psychiatric/Behavioral: Negative for depression, hallucinations, substance abuse and suicidal ideas. The patient is not nervous/anxious and does not have insomnia. Physical Exam   Constitutional: She is oriented to person, place, and time. She appears well-developed and well-nourished. No distress. HENT:   Head: Normocephalic and atraumatic. Pulmonary/Chest: Effort normal. No respiratory distress. Neurological: She is alert and oriented to person, place, and time. Skin: Skin is warm and dry. She is not diaphoretic. Psychiatric: She has a normal mood and affect. Her behavior is normal. Judgment normal.   Nursing note and vitals reviewed. ASSESSMENT and PLAN    ICD-10-CM ICD-9-CM    1. Moderate major depression (HCC) F32.1 296.22 venlafaxine-SR (EFFEXOR-XR) 37.5 mg capsule   2. Obesity (BMI 30-39. 9) E66.9 278.00      She is doing very well. We will taper her effexor over 6 weeks; a calendar was provided to do this to minimize side effects, though she was encouraged to call if she needs any assistance. Discussed strategies to prevent relapse prevention.   Encouraged her to continu e her work on diet and exercise, with the goal of being < 200 lbs at her next visit. Return in 3 months for CPE, sooner PRN. Follow-up and Dispositions    · Return in about 3 months (around 9/7/2019) for CPE, sooner PRN.

## 2019-06-07 NOTE — PATIENT INSTRUCTIONS
Preventing a Relapse of Depression: Care Instructions  Your Care Instructions    A relapse of depression means your symptoms have come back after you have gotten better. This illness often comes and goes during a lifetime. But there are many things you can do to keep it from coming back. Follow-up care is a key part of your treatment and safety. Be sure to make and go to all appointments, and call your doctor if you are having problems. It's also a good idea to know your test results and keep a list of the medicines you take. What do you need to know? Know your risk of relapse  Talk to your doctor to find out if you are at risk of relapse. Many things can make a person more likely to relapse into depression. These include having a family member with depression, dealing with serious problems in a relationship or a job, having a serious medical condition, or abusing drugs or alcohol. It is important to know your risk and to recognize warning signs of relapse. Once you know these things, you will be better able to keep it from happening to you. Know the warning signs of relapse  The two most common signs of relapse are:  · Feeling sad or hopeless. · Losing interest in your daily activities. You may have other symptoms, such as:  · You lose or gain weight. · You sleep too much or not enough. · You feel restless and unable to sit still. · You feel unable to move. · You feel tired all the time. · You feel unworthy or guilty without an obvious reason. · You have problems concentrating, remembering, or making decisions. · You think often about death or suicide. · You feel angry or have panic attacks. How can you care for yourself at home? · Take your medicine as prescribed. Call your doctor if you have any problems with your medicine. Many people take their medicines for at least 6 months after they have recovered. This often helps keep symptoms from coming back.  However, if your depression keeps coming back, you may have to take medicine for the rest of your life. · Continue counseling even after you have stopped taking medicine. · Eat healthy foods. Include fruits, vegetables, beans, and whole grains in your diet each day. · Get at least 30 minutes of exercise on most days of the week. Walking is a good choice. You also may want to do other activities, such as running, swimming, cycling, or playing tennis or team sports. · See your doctor right away if you have new symptoms or feel that your depression is coming back. · Keep a regular sleep schedule. Try for 8 hours of sleep a night. · Avoid alcohol and illegal drugs. · Keep the numbers for these national suicide hotlines: 0-584-584-TALK (4-500.981.4980) and 4-407-YEYCIFG (1-397.277.7455). If you or someone you know talks about suicide or feeling hopeless, get help right away. When should you call for help? Call 911 anytime you think you may need emergency care.  For example, call if:    · You are thinking about suicide or are threatening suicide.     · You feel you cannot stop from hurting yourself or someone else.     · You hear or see things that aren't real.     · You think or speak in a bizarre way that is not like your usual behavior.    Call your doctor now or seek immediate medical care if:    · You are drinking a lot of alcohol or using illegal drugs.     · You are talking or writing about death.    Watch closely for changes in your health, and be sure to contact your doctor if:    · You find it hard or it's getting harder to deal with school, a job, family, or friends.     · You think your treatment is not helping or you are not getting better.     · Your symptoms get worse or you get new symptoms.     · You have any problems with your antidepressant medicines, such as side effects, or you are thinking about stopping your medicine.     · You are having manic behavior, such as having very high energy, needing less sleep than normal, or showing risky behavior such as spending money you don't have or abusing others verbally or physically. Where can you learn more? Go to http://mitch-radha.info/. Enter Q245 in the search box to learn more about \"Preventing a Relapse of Depression: Care Instructions. \"  Current as of: September 11, 2018  Content Version: 11.9  © 9920-5747 [employer], [employer]. Care instructions adapted under license by [employer] (which disclaims liability or warranty for this information). If you have questions about a medical condition or this instruction, always ask your healthcare professional. [place] [address] any warranty or liability for your use of this information.

## 2019-06-07 NOTE — PROGRESS NOTES
Chief Complaint   Patient presents with    Medication Evaluation     issue with Effexor    Depression     1. Have you been to the ER, urgent care clinic since your last visit? Hospitalized since your last visit? No    2. Have you seen or consulted any other health care providers outside of the [address] [place] [address] since your last visit? Include any pap smears or colon screening. No     There are no preventive care reminders to display for this patient.

## 2019-07-01 ENCOUNTER — TELEPHONE (OUTPATIENT)
Dept: FAMILY MEDICINE CLINIC | Age: 34
End: 2019-07-01

## 2019-07-01 DIAGNOSIS — F32.1 MODERATE MAJOR DEPRESSION (HCC): ICD-10-CM

## 2019-07-01 RX ORDER — VENLAFAXINE HYDROCHLORIDE 37.5 MG/1
37.5 CAPSULE, EXTENDED RELEASE ORAL DAILY
Qty: 30 CAP | Refills: 0 | Status: SHIPPED | OUTPATIENT
Start: 2019-07-01 | End: 2019-08-06 | Stop reason: SDUPTHER

## 2019-07-01 NOTE — TELEPHONE ENCOUNTER
Per patient she wanted to come off of Effexor and at last visit dose was reduced to 37.5 mg. Patient states that she got to to point of taking every other day as directed but when it got time for \"skip day\" she experienced headache, nausea and upset stomach so she is still taking daily but \"pushing back\" the time she takes it.   For example if she takes at 7:00 pm one day then next will be 8:00 pm etc.  Will review with pcp and advise of med refill request.

## 2019-07-01 NOTE — TELEPHONE ENCOUNTER
That is fine. She can go ahead and take 2 days, skip 1 day, take 2 days, skip 1 day for an additional 2 weeks to slow down the taper, and then go to every-other-day for 2 - 3 weeks, then every 3rd day for 2-3 weeks, then stop. Thanks.   Tc Beltran NP

## 2019-07-01 NOTE — TELEPHONE ENCOUNTER
----- Message from Nannette Streling sent at 7/1/2019  9:43 AM EDT -----  Regarding: Yvette Yip/jeff  Pt is requesting a refill for her \"Effexor\" be sent to the [address] [place] [address] on [employer]. She would also like to discuss the side effects from her dosage being changed.  Best contact number is [phone]

## 2019-08-06 DIAGNOSIS — F32.1 MODERATE MAJOR DEPRESSION (HCC): ICD-10-CM

## 2019-08-06 RX ORDER — VENLAFAXINE HYDROCHLORIDE 37.5 MG/1
37.5 CAPSULE, EXTENDED RELEASE ORAL DAILY
Qty: 30 CAP | Refills: 1 | Status: SHIPPED | OUTPATIENT
Start: 2019-08-06 | End: 2019-10-07 | Stop reason: SDUPTHER

## 2019-08-06 NOTE — TELEPHONE ENCOUNTER
----- Message from Jack Stafford sent at 8/6/2019  9:53 AM EDT -----  Regarding: NP Alvaro / Refill  Medication Refill    Caller (if not patient):  PATIENT    Relationship of caller (if not patient):  SELF    Best contact number(s):  [phone]    Name of medication and dosage if known:  EFFEXOR     Is patient out of this medication (yes/no):  YES    Pharmacy name:  [facility] listed in chart? (yes/no):  YES     Pharmacy phone number:  [phone]      Details to clarify the request:  N/A     Jack Stafford

## 2019-10-07 DIAGNOSIS — F32.1 MODERATE MAJOR DEPRESSION (HCC): ICD-10-CM

## 2019-10-07 NOTE — TELEPHONE ENCOUNTER
Med refill request forwarded to Dr. Padma Hess. PCP on maternity leave. Patient notified of same day only appointments for due to too many no show appointments.

## 2019-10-07 NOTE — TELEPHONE ENCOUNTER
Patient called stating that she needs a refill on venlafaxine-SR [facility] 37.5 mg capsule . Patient can be reached at [phone].

## 2019-10-08 RX ORDER — VENLAFAXINE HYDROCHLORIDE 37.5 MG/1
37.5 CAPSULE, EXTENDED RELEASE ORAL DAILY
Qty: 30 CAP | Refills: 1 | Status: SHIPPED | OUTPATIENT
Start: 2019-10-08 | End: 2019-12-06 | Stop reason: SDUPTHER

## 2019-12-06 ENCOUNTER — OFFICE VISIT (OUTPATIENT)
Dept: FAMILY MEDICINE CLINIC | Age: 34
End: 2019-12-06

## 2019-12-06 VITALS
DIASTOLIC BLOOD PRESSURE: 60 MMHG | BODY MASS INDEX: 32.49 KG/M2 | HEIGHT: 67 IN | RESPIRATION RATE: 16 BRPM | SYSTOLIC BLOOD PRESSURE: 118 MMHG | OXYGEN SATURATION: 100 % | HEART RATE: 90 BPM | WEIGHT: 207 LBS | TEMPERATURE: 98.3 F

## 2019-12-06 DIAGNOSIS — F41.8 DEPRESSION WITH ANXIETY: Primary | ICD-10-CM

## 2019-12-06 RX ORDER — VENLAFAXINE HYDROCHLORIDE 37.5 MG/1
37.5 CAPSULE, EXTENDED RELEASE ORAL DAILY
Qty: 90 CAP | Refills: 0 | Status: SHIPPED | OUTPATIENT
Start: 2019-12-06 | End: 2020-03-12

## 2019-12-06 RX ORDER — VENLAFAXINE HYDROCHLORIDE 37.5 MG/1
37.5 CAPSULE, EXTENDED RELEASE ORAL DAILY
Qty: 90 CAP | Refills: 0 | Status: SHIPPED | OUTPATIENT
Start: 2019-12-06 | End: 2019-12-06 | Stop reason: SDUPTHER

## 2019-12-06 NOTE — PROGRESS NOTES
HISTORY OF PRESENT ILLNESS  Aleksey Galdamez is a 37 y.o. female. HPI   For med refill. Pt of Otf Mendoza needs refill on Effexor. Anxiety/Depression  Has been taking 75mg effexor-XR daily. Feels like things are going very well. PHQ-2 score of 0 today. Denies SI/HI. She reports that things are going very well. Tried to come off of the effexor by weaning it back to every other day but she did not like how it was making her feel. Patient Active Problem List   Diagnosis Code    Depression with anxiety F41.8    Obesity (BMI 30-39. 9) E66.9    Iron deficiency anemia D50.9    Prediabetes R73.03    Vitamin D deficiency E55.9    Moderate major depression (HCC) F32.1       Current Outpatient Medications   Medication Sig Dispense Refill    venlafaxine-SR (EFFEXOR-XR) 37.5 mg capsule Take 1 Cap by mouth daily. Take according to calendar directions. 90 Cap 0    clindamycin-benzoyl Peroxide (DUAC) 1.2 %(1 % base) -5 % SR topical gel Apply  to affected area nightly. 1 Tube 2    tretinoin (RETIN-A) 0.1 % topical cream Apply  to affected area two (2) times daily as needed. 20 g 0    Cholecalciferol, Vitamin D3, (VITAMIN D3) 2,000 unit cap capsule Take 2,000 Units by mouth daily. 30 Cap 11    lactobacillus rhamnosus gg 15 billion cell (CULTURELLE) 15 billion cell capsule Take 1 Cap by mouth daily. 30 Cap 11    Ferrous Sulfate (SLOW FE) 47.5 mg iron TbER tablet Take 1 Tab by mouth daily.  Indications: Iron Deficiency Anemia 30 Tab 2       No Known Allergies    Past Medical History:   Diagnosis Date    Anemia     iron deficiency anemia    Depression        Past Surgical History:   Procedure Laterality Date    HX GYN  2011       Family History   Problem Relation Age of Onset    Hypertension Mother    [address] [facility] Anxiety Mother     Neuropathy Mother     Elevated Lipids Mother     COPD Father     Glaucoma Father     Depression Father     Prostate Cancer Father     Hypertension Sister     No Known Problems Sister     Lupus Sister     Thyroid Disease Sister        Social History     Tobacco Use    Smoking status: Never Smoker    Smokeless tobacco: Never Used   Substance Use Topics    Alcohol use: Yes     Comment: occasional        Review of Systems   Constitutional: Negative for malaise/fatigue. HENT: Negative for congestion. Eyes: Negative for blurred vision. Respiratory: Negative for cough and shortness of breath. Cardiovascular: Negative for chest pain, palpitations and leg swelling. Gastrointestinal: Negative for abdominal pain, constipation and heartburn. Genitourinary: Negative for dysuria, frequency and urgency. Musculoskeletal: Negative for back pain and joint pain. Neurological: Negative for dizziness, tingling and headaches. Endo/Heme/Allergies: Negative for environmental allergies. Psychiatric/Behavioral: Negative for depression. The patient does not have insomnia. Physical Exam  Vitals signs and nursing note reviewed. Constitutional:       Appearance: Normal appearance. She is well-developed. Comments: /60 (BP 1 Location: Left arm, BP Patient Position: Sitting)   Pulse 90   Temp 98.3 °F (36.8 °C) (Oral)   Resp 16   Ht 5' 7\" (1.702 m)   Wt 207 lb (93.9 kg)   LMP 12/03/2019   SpO2 100%   BMI 32.42 kg/m²    HENT:      Right Ear: Tympanic membrane and ear canal normal.      Left Ear: Tympanic membrane and ear canal normal.      Nose: No mucosal edema or rhinorrhea. Neck:      Musculoskeletal: Normal range of motion and neck supple. Thyroid: No thyromegaly. Cardiovascular:      Rate and Rhythm: Normal rate and regular rhythm. Heart sounds: Normal heart sounds. No gallop. Pulmonary:      Effort: Pulmonary effort is normal.      Breath sounds: Normal breath sounds. Abdominal:      General: Bowel sounds are normal.      Palpations: Abdomen is soft. There is no mass. Tenderness: There is no tenderness.    Musculoskeletal: Normal range of motion. Lymphadenopathy:      Cervical: No cervical adenopathy. Skin:     General: Skin is warm and dry. ASSESSMENT and PLAN  Diagnoses and all orders for this visit:    1. Depression with anxiety  -     venlafaxine-SR (EFFEXOR-XR) 37.5 mg capsule; Take 1 Cap by mouth daily. She will follow up with Mandeep Duenas for her next visit. Follow-up and Dispositions    · Return in about 2 months (around 2/6/2020). reviewed medications and side effects in detail    Through the use of shared decision making we have agreed to the above plan. The patient has received an after-visit summary and questions were answered concerning future plans and follow up. Advise pt of any urgent changes then to proceed to the ER.

## 2019-12-06 NOTE — PROGRESS NOTES
Chief Complaint   Patient presents with    Medication Refill       Patient states she will get flu vaccine at work. 1. Have you been to the ER, urgent care clinic since your last visit? Hospitalized since your last visit? No    2. Have you seen or consulted any other health care providers outside of the [address] [place] [address] since your last visit? Include any pap smears or colon screening.  No

## 2020-03-12 ENCOUNTER — OFFICE VISIT (OUTPATIENT)
Dept: FAMILY MEDICINE CLINIC | Age: 35
End: 2020-03-12

## 2020-03-12 VITALS
BODY MASS INDEX: 32.8 KG/M2 | HEART RATE: 88 BPM | TEMPERATURE: 96.9 F | DIASTOLIC BLOOD PRESSURE: 68 MMHG | SYSTOLIC BLOOD PRESSURE: 117 MMHG | WEIGHT: 209 LBS | RESPIRATION RATE: 22 BRPM | HEIGHT: 67 IN | OXYGEN SATURATION: 95 %

## 2020-03-12 DIAGNOSIS — J06.9 VIRAL URI WITH COUGH: ICD-10-CM

## 2020-03-12 DIAGNOSIS — F41.8 DEPRESSION WITH ANXIETY: ICD-10-CM

## 2020-03-12 DIAGNOSIS — F32.1 MODERATE MAJOR DEPRESSION (HCC): Primary | ICD-10-CM

## 2020-03-12 RX ORDER — VENLAFAXINE 37.5 MG/1
37.5 TABLET ORAL 2 TIMES DAILY
Qty: 180 TAB | Refills: 0 | Status: SHIPPED | OUTPATIENT
Start: 2020-03-12 | End: 2021-04-03

## 2020-03-12 RX ORDER — BENZONATATE 200 MG/1
200 CAPSULE ORAL
Qty: 21 CAP | Refills: 0 | Status: SHIPPED | OUTPATIENT
Start: 2020-03-12 | End: 2020-03-19

## 2020-03-12 NOTE — PROGRESS NOTES
Chief Complaint   Patient presents with    Medication Evaluation     effexor    Cough     Patient in today to discuss Effexor and get refills    Would like to know if she can have something for her cough to help her sleep through the night

## 2020-03-12 NOTE — PROGRESS NOTES
HISTORY OF PRESENT ILLNESS  Nancy Blake is a 37 y.o. female. HPI  Here today for follow up. Missed her CPE with me in September 2019. Was seen earlier this week at her office for URI; on azithromycin, which has been helping, but requesting tessalon for cough so she can get some sleep at night. Anxiety/Depression  Has been taking 75mg effexor-XR daily. . Feels like things are going very well. PHQ-2 score of 0 today. Denies SI/HI. Her divorce was finalized in June 2018, and she feels a sense of freedom since that happened. At her last visit in June 2019, se discussed wanting to come off the effexor. We had discussed a taper schedule, but she had bad symptoms while doing so (headaches, GI symptoms) so she went back on it. She saw Dr. Romina Salazar in November 2019 and was given a refill at the 37.5mg daily, which she has been taking. PHQ-9 score of 5, EMEKA score of 6 today. Still wants to come off the effexor entirely as she doesn't think she needs it for anxiety/depression any longer, but is scared to come off. Iron deficiency anemia  Was found on routine labs Feb 2018. Was switched to slow-Fe after having GI distress on regular iron supplements; endorses good adherence to medication regimen. Hgb WNL November 2018. Acne  Saw Dr. Zaheer Lee, who prescribed topical therapy (retin-A), which is really helping. Preventative Care  TDaP: declines  Pap smear: March 2017, NILM  Denies any family hx of breast or colon CA. Healthy Habits  Patient is exercising 3 - 4x per week, doing elliptical and weights. Feeling great. Patient endorses improving diet; working on decreasing sweet intake and more keto-light diet. Denies tobacco use. Social alcohol use. Denies illicit drug use. Sexually active with 1 male partner in last 12 months.; no concern for STIs today. Hx tubal ligation.      Past Medical History:   Diagnosis Date    Anemia     iron deficiency anemia    Depression      Past Surgical History: Procedure Laterality Date    HX GYN  2011     Family History   Problem Relation Age of Onset    Hypertension Mother    Sueadrianne Genao Anxiety Mother     Neuropathy Mother     Elevated Lipids Mother     COPD Father     Glaucoma Father     Depression Father     Prostate Cancer Father     Hypertension Sister     No Known Problems Sister     Lupus Sister     Thyroid Disease Sister      Social History     Socioeconomic History    Marital status: SINGLE     Spouse name: Not on file    Number of children: Not on file    Years of education: Not on file    Highest education level: Not on file   Tobacco Use    Smoking status: Never Smoker    Smokeless tobacco: Never Used   Substance and Sexual Activity    Alcohol use: Yes     Comment: occasional    Drug use: No    Sexual activity: Yes     Partners: Male     Birth control/protection: Surgical     Patient Active Problem List   Diagnosis Code    Depression with anxiety F41.8    Obesity (BMI 30-39. 9) E66.9    Iron deficiency anemia D50.9    Prediabetes R73.03    Vitamin D deficiency E55.9    Moderate major depression (HCC) F32.1     Outpatient Encounter Medications as of 3/12/2020   Medication Sig Dispense Refill    benzonatate (TESSALON) 200 mg capsule Take 1 Cap by mouth three (3) times daily as needed for Cough for up to 7 days. 21 Cap 0    venlafaxine (EFFEXOR) 37.5 mg tablet Take 1 Tab by mouth two (2) times a day. 180 Tab 0    clindamycin-benzoyl Peroxide (DUAC) 1.2 %(1 % base) -5 % SR topical gel Apply  to affected area nightly. 1 Tube 2    tretinoin (RETIN-A) 0.1 % topical cream Apply  to affected area two (2) times daily as needed. 20 g 0    Cholecalciferol, Vitamin D3, (VITAMIN D3) 2,000 unit cap capsule Take 2,000 Units by mouth daily. 30 Cap 11    [DISCONTINUED] venlafaxine-SR (EFFEXOR-XR) 37.5 mg capsule Take 1 Cap by mouth daily. 90 Cap 0     No facility-administered encounter medications on file as of 3/12/2020.       Visit Vitals  /68 (BP 1 Location: Right arm, BP Patient Position: Sitting)   Pulse 88   Temp 96.9 °F (36.1 °C)   Resp 22   Ht 5' 7\" (1.702 m)   Wt 209 lb (94.8 kg)   LMP 02/17/2020   SpO2 95%   BMI 32.73 kg/m²           Review of Systems   Constitutional: Negative for chills, fever and weight loss. Respiratory: Positive for cough. Negative for sputum production, shortness of breath and wheezing. Psychiatric/Behavioral: Negative for depression. The patient is not nervous/anxious. Physical Exam  Vitals signs and nursing note reviewed. Constitutional:       General: She is not in acute distress. Appearance: She is well-developed. She is obese. She is not diaphoretic. HENT:      Head: Normocephalic and atraumatic. Cardiovascular:      Rate and Rhythm: Normal rate and regular rhythm. Heart sounds: Normal heart sounds. Pulmonary:      Effort: Pulmonary effort is normal. No respiratory distress. Breath sounds: Normal breath sounds. No wheezing. Comments: Occasional non-productive cough  Skin:     General: Skin is warm and dry. Neurological:      Mental Status: She is alert and oriented to person, place, and time. Psychiatric:         Behavior: Behavior normal.         Judgment: Judgment normal.         ASSESSMENT and PLAN    ICD-10-CM ICD-9-CM    1. Moderate major depression (HCC) F32.1 296.22 venlafaxine (EFFEXOR) 37.5 mg tablet   2. Depression with anxiety F41.8 300.4 venlafaxine (EFFEXOR) 37.5 mg tablet   3. Viral URI with cough J06.9 465.9 benzonatate (TESSALON) 200 mg capsule    B97.89       Will attempt to taper effexor more slowly to reduce risk of side effects. Will switch from XR version to IR and have her take 37.5mg IR effexor twice daily for the next month or so, and then switch to once daily with goal to discontinue completely within about 3 - 4 months. If she develops side effects, will Rx 25mg effexor in an attempt to mitigate side effects; she knows to call if she needs this.    Rx for tessalon for ongoing cough. RTC in 3 months for CPE and labs, sooner PRN. Follow-up and Dispositions    · Return in about 3 months (around 6/12/2020) for CPE, sooner PRN.

## 2020-04-29 NOTE — PATIENT INSTRUCTIONS
Dx: Status post right hip replacement (D91.145)   3/2/20           Insurance (Authorized # of Visits):  PPO, 12 per POC           Authorizing Physician: Dr. Micheal Hatch (Surgeon: Dr. Lakshmi Alaniz) Next MD visit: Needs to schedule  Fall Risk: standard         Precaution Urinary Tract Infection in Women: Care Instructions  Your Care Instructions    A urinary tract infection, or UTI, is a general term for an infection anywhere between the kidneys and the urethra (where urine comes out). Most UTIs are bladder infections. They often cause pain or burning when you urinate. UTIs are caused by bacteria and can be cured with antibiotics. Be sure to complete your treatment so that the infection goes away. Follow-up care is a key part of your treatment and safety. Be sure to make and go to all appointments, and call your doctor if you are having problems. It's also a good idea to know your test results and keep a list of the medicines you take. How can you care for yourself at home? · Take your antibiotics as directed. Do not stop taking them just because you feel better. You need to take the full course of antibiotics. · Drink extra water and other fluids for the next day or two. This may help wash out the bacteria that are causing the infection. (If you have kidney, heart, or liver disease and have to limit fluids, talk with your doctor before you increase your fluid intake.)  · Avoid drinks that are carbonated or have caffeine. They can irritate the bladder. · Urinate often. Try to empty your bladder each time. · To relieve pain, take a hot bath or lay a heating pad set on low over your lower belly or genital area. Never go to sleep with a heating pad in place. To prevent UTIs  · Drink plenty of water each day. This helps you urinate often, which clears bacteria from your system. (If you have kidney, heart, or liver disease and have to limit fluids, talk with your doctor before you increase your fluid intake.)  · Urinate when you need to. · Urinate right after you have sex. · Change sanitary pads often. · Avoid douches, bubble baths, feminine hygiene sprays, and other feminine hygiene products that have deodorants.   · After going to the bathroom, wipe from front to back.  When should you call for help? Call your doctor now or seek immediate medical care if:  · Symptoms such as fever, chills, nausea, or vomiting get worse or appear for the first time. · You have new pain in your back just below your rib cage. This is called flank pain. · There is new blood or pus in your urine. · You have any problems with your antibiotic medicine. Watch closely for changes in your health, and be sure to contact your doctor if:  · You are not getting better after taking an antibiotic for 2 days. · Your symptoms go away but then come back. Where can you learn more? Go to http://mitch-radha.info/. Enter N833 in the search box to learn more about \"Urinary Tract Infection in Women: Care Instructions. \"  Current as of: November 28, 2016  Content Version: 11.2  © 3309-2030 [employer], [employer]. Care instructions adapted under license by [employer] (which disclaims liability or warranty for this information). If you have questions about a medical condition or this instruction, always ask your healthcare professional. [address] any warranty or liability for your use of this information. the house with no hip pain Progressing 75%  · Pt will improve functional hip strength to report ability to ascend/descend 1 flight of stairs reciprocally without use of handrail (progressing) Progressing 75%  · Patient will be able to complete STS without flights up and down, 1 UE support   --SLS, verbal cues for form, 5x30 sec hold  -Lateral stepping R+L  YTB at knees 3 laps, YTB at ankles 3 laps TherEx:  -Nu-step L6 LE reciprocal AROM 5 min  -Step ups on 2 inch step and airex R+L 3x10 reps   -Lateral side 10, 2 sets    (tactile cues at knee correct R hip IR/ADD)        4\" L and R LSU, B UE support x 10 each side  2 sets (cues for hip symmetry and posture)      Stand: R side to wall:  -L UE with chair for support, maintain posture, perform R lower leg slide

## 2020-06-29 ENCOUNTER — TELEPHONE (OUTPATIENT)
Dept: FAMILY MEDICINE CLINIC | Age: 35
End: 2020-06-29

## 2020-06-29 NOTE — TELEPHONE ENCOUNTER
Received message that pt would like to become pt of Franklyn Najjar, NP due to NP saul leaving. Pt needs to establish and go over side effects of Effexor . Pt scheduled for VV 7/6/20 at 11 AM.     Left Message for patient to return call to office.

## 2020-07-06 ENCOUNTER — VIRTUAL VISIT (OUTPATIENT)
Dept: FAMILY MEDICINE CLINIC | Age: 35
End: 2020-07-06

## 2020-07-06 DIAGNOSIS — H53.2 DOUBLE VISION: ICD-10-CM

## 2020-07-06 DIAGNOSIS — R42 DIZZINESS: ICD-10-CM

## 2020-07-06 DIAGNOSIS — H53.8 BLURRED VISION: Primary | ICD-10-CM

## 2020-07-06 RX ORDER — BISMUTH SUBSALICYLATE 262 MG
1 TABLET,CHEWABLE ORAL DAILY
COMMUNITY

## 2020-07-06 NOTE — PROGRESS NOTES
Chief Complaint   Patient presents with    Establish Care     Pt has been off Effexor for 4 months and still feeling the \"withdrawls\" of the medication. 1. Have you been to the ER, urgent care clinic since your last visit? Hospitalized since your last visit? No    2. Have you seen or consulted any other health care providers outside of the [address] [place] [address] since your last visit? Include any pap smears or colon screening.  No    Health Maintenance Due   Topic Date Due    A1C test (Diabetic or Prediabetic)  11/27/2019    PAP AKA CERVICAL CYTOLOGY  03/29/2020

## 2020-07-06 NOTE — PROGRESS NOTES
Elizabeth Manjarrez is a 37 y.o. female who was seen by synchronous (real-time) audio-video technology on 7/6/2020 for Establish Care        Assessment & Plan:   Diagnoses and all orders for this visit:    1. Blurred vision - patient has been off effexor for 4 months, doubt related to any withdrawal effect. Explained to patient that typically withdrawal symptoms can last 2-3 weeks after stopping medication. She has seen optometrist with no abnormal finding. Will refer to neurology - patient to make own appt  -     REFERRAL TO NEUROLOGY    2. Double vision  -     REFERRAL TO NEUROLOGY    3. Dizziness  -     REFERRAL TO NEUROLOGY            I spent at least 15 minutes on this visit with this established patient. 712  Subjective:   Patient is establishing care with me today. Was seeing Cortez Denny NP in practice who has since left. She had been trying to wean off Effexor for last year, Pt has been off Effexor for 4 months and still feeling the \"withdrawls\" of the medication. States she is still having double vision. Went to eye doctor, changed glasses, but still having double vision, vision is not clear or crisp. Having some dizziness, depending on fast she gets ups. States she sleeps okay at [facility]. No hx of head or brain injury,  Sister with cutaneous lupus. Patient denies speech changes, memory concerns, numbness or tingling in extremities, unilateral weakness. Has not seen neurology yet. States moods are good. Prior to Admission medications    Medication Sig Start Date End Date Taking? Authorizing Provider   multivitamin (ONE A DAY) tablet Take 1 Tab by mouth daily. Yes Provider, Historical   clindamycin-benzoyl Peroxide (DUAC) 1.2 %(1 % base) -5 % SR topical gel Apply  to affected area nightly. 6/7/19  Yes Yvette John NP   tretinoin (RETIN-A) 0.1 % topical cream Apply  to affected area two (2) times daily as needed.  6/15/18  Yes Eric Moulton NP   venlafaxine (EFFEXOR) 37.5 mg tablet Take 1 Tab by mouth two (2) times a day. Patient not taking: Reported on 7/6/2020 3/12/20   Katia Cruz NP   Cholecalciferol, Vitamin D3, (VITAMIN D3) 2,000 unit cap capsule Take 2,000 Units by mouth daily. Patient not taking: Reported on 7/6/2020 3/26/18   Katia Cruz NP     Patient Active Problem List   Diagnosis Code    Depression with anxiety F41.8    Obesity (BMI 30-39. 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  clindamycin-benzoyl Peroxide (DUAC) 1.2 %(1 % base) -5 % SR topical gel Apply  to affected area nightly. 1 Tube 2    tretinoin (RETIN-A) 0.1 % topical cream Apply  to affected area two (2) times daily as needed. 20 g 0    venlafaxine (EFFEXOR) 37.5 mg tablet Take 1 Tab by mouth two (2) times a day. (Patient not taking: Reported on 7/6/2020) 180 Tab 0    Cholecalciferol, Vitamin D3, (VITAMIN D3) 2,000 unit cap capsule Take 2,000 Units by mouth daily.  (Patient not taking: Reported on 7/6/2020) 30 Cap 11     No Known Allergies  Past Medical History:   Diagnosis Date    Anemia     iron deficiency anemia    Depression      Past Surgical History:   Procedure Laterality Date    HX GYN  2011     Family History   Problem Relation Age of Onset    Hypertension Mother    Pedersen Anxiety Mother     Neuropathy Mother     Elevated Lipids Mother     COPD Father     Glaucoma Father     Depression Father     Prostate Cancer Father     Hypertension Sister     No Known Problems Sister     Lupus Sister     Thyroid Disease Sister      Social History     Tobacco Use    Smoking status: Never Smoker    Smokeless tobacco: Never Used   Substance Use Topics    Alcohol use: Yes     Comment: occasional       Review of Systems   Constitutional: Negative for chills, fever and weight loss.   Eyes: Positive for blurred vision and double vision. Respiratory: Negative. Cardiovascular: Negative. Neurological: Positive for dizziness. Negative for tingling, sensory change, speech change, focal weakness and headaches. Psychiatric/Behavioral: Negative for depression and suicidal ideas. The patient is nervous/anxious (manages with deep breathing exercises). Objective:   No flowsheet data found. General: alert, cooperative, no distress   Mental  status: normal mood, behavior, speech, dress, motor activity, and thought processes, able to follow commands   HENT: NCAT   Neck: no visualized mass   Resp: no respiratory distress   Neuro: no gross deficits   Skin: no discoloration or lesions of concern on visible areas   Psychiatric: normal affect, consistent with stated mood, no evidence of hallucinations     Additional exam findings: none      We discussed the expected course, resolution and complications of the diagnosis(es) in detail. Medication risks, benefits, costs, interactions, and alternatives were discussed as indicated. I advised her to contact the office if her condition worsens, changes or fails to improve as anticipated. She expressed understanding with the diagnosis(es) and plan. Massiel Sierra, who was evaluated through a patient-initiated, synchronous (real-time) audio-video encounter, and/or her healthcare decision maker, is aware that it is a billable service, with coverage as determined by her insurance carrier. She provided verbal consent to proceed: Yes, and patient identification was verified. It was conducted pursuant to the emergency declaration under the [address] [place] [address], [address] [place] [address] authority and the [employer] Act. A caregiver was present when appropriate. Ability to conduct physical exam was limited. I was at home. The patient was at home.       Salome Alatorre NP    This note will not be viewable in [address]

## 2020-10-14 ENCOUNTER — OFFICE VISIT (OUTPATIENT)
Dept: NEUROLOGY | Age: 35
End: 2020-10-14
Payer: COMMERCIAL

## 2020-10-14 VITALS
BODY MASS INDEX: 28.29 KG/M2 | TEMPERATURE: 98.1 F | SYSTOLIC BLOOD PRESSURE: 120 MMHG | OXYGEN SATURATION: 100 % | WEIGHT: 191 LBS | RESPIRATION RATE: 16 BRPM | HEART RATE: 89 BPM | DIASTOLIC BLOOD PRESSURE: 72 MMHG | HEIGHT: 69 IN

## 2020-10-14 DIAGNOSIS — E53.8 VITAMIN B 12 DEFICIENCY: ICD-10-CM

## 2020-10-14 DIAGNOSIS — H53.2 DIPLOPIA: Primary | ICD-10-CM

## 2020-10-14 DIAGNOSIS — E55.9 VITAMIN D DEFICIENCY: ICD-10-CM

## 2020-10-14 DIAGNOSIS — T43.205A SEROTONIN WITHDRAWAL SYNDROME, INITIAL ENCOUNTER: ICD-10-CM

## 2020-10-14 PROCEDURE — 99244 OFF/OP CNSLTJ NEW/EST MOD 40: CPT | Performed by: PSYCHIATRY & NEUROLOGY

## 2020-10-14 NOTE — PROGRESS NOTES
Chief Complaint   Patient presents with   [facility] Other     has been weaning off Effexor, it has really affected her vision, feels like she is seeing double.  sluggishness has subsided with the vertigo      Visit Vitals  /72 (BP 1 Location: Left arm, BP Patient Position: Sitting)   Pulse 89   Temp 98.1 °F (36.7 °C)   Resp 16   Ht 5' 9\" (1.753 m)   Wt 191 lb (86.6 kg)   SpO2 100%   BMI 28.21 kg/m²

## 2020-10-14 NOTE — PROGRESS NOTES
NEUROLOGY HISTORY AND PHYSICAL    Name Elian Wilson Age 38 y.o. MRN [id number]  1985     Referring Physician: Rachel Cruz NP      Chief Complaint:  Vision problems     This is a 38 y.o. right handed female who comes with a complaint of vision problems since coming off the effexor. She feels that she cant' fuse the images that her eyes see. Assessment and Plan  1. Diplopia  - TSH 3RD GENERATION  - T4, FREE  - THYROID PEROXIDASE (TPO) AB    2. Serotonin withdrawal syndrome, initial encounter  reassurance    3. Vitamin D deficiency  continue vitamin D    4. Vitamin B 12 deficiency  Vitamin B12        No Known Allergies        Current Outpatient Medications   Medication Sig    multivitamin (ONE A DAY) tablet Take 1 Tab by mouth daily.  clindamycin-benzoyl Peroxide (DUAC) 1.2 %(1 % base) -5 % SR topical gel Apply  to affected area nightly.  tretinoin (RETIN-A) 0.1 % topical cream Apply  to affected area two (2) times daily as needed.  venlafaxine (EFFEXOR) 37.5 mg tablet Take 1 Tab by mouth two (2) times a day. (Patient not taking: Reported on 2020)    Cholecalciferol, Vitamin D3, (VITAMIN D3) 2,000 unit cap capsule Take 2,000 Units by mouth daily. (Patient not taking: Reported on 2020)     No current facility-administered medications for this visit. Past Medical History:   Diagnosis Date    Anemia     iron deficiency anemia    Depression         Social History     Tobacco Use    Smoking status: Never Smoker    Smokeless tobacco: Never Used   Substance Use Topics    Alcohol use: Yes     Comment: occasional    Drug use: No        Review of Systems   Constitutional: Negative for chills and fever. HENT: Negative for ear pain. Eyes: Positive for blurred vision, double vision and pain. Negative for discharge. Respiratory: Negative for cough and hemoptysis. Cardiovascular: Negative for chest pain and claudication.    Gastrointestinal: Negative for constipation and diarrhea. Genitourinary: Negative for flank pain and hematuria. Musculoskeletal: Negative for back pain and myalgias. Skin: Negative for itching and rash. Neurological: Negative for headaches. Endo/Heme/Allergies: Negative for environmental allergies. Does not bruise/bleed easily. Psychiatric/Behavioral: Negative for depression and hallucinations. Exam  Visit Vitals  /72 (BP 1 Location: Left arm, BP Patient Position: Sitting)   Pulse 89   Temp 98.1 °F (36.7 °C)   Resp 16   Ht 5' 9\" (1.753 m)   Wt 191 lb (86.6 kg)   SpO2 100%   BMI 28.21 kg/m²         General: Well developed, well nourished. Patient in no distress   Head: Normocephalic, atraumatic, anicteric sclera   Neck Normal ROM, No thyromegally   Lungs:  Clear to auscultation    Cardiac: Regular rate and rhythm with no murmurs. Abd: Bowel sounds were audible   Ext: No pedal edema   Skin: Supple no rash     NeurologicExam:  Mental Status: Alert and oriented to person place and time   Speech: Fluent no aphasia or dysarthria. Cranial Nerves:   II-XII Intact    Motor:  Full and symmetric strength of upper and lower ext . Normal bulk and tone. Reflexes:   Deep tendon reflexes 2+/4 and symmetric. Sensory:   Symmetric and intact    Gait:  Gait is balanced    Tremor:   No tremor noted. Cerebellar:  Coordination intact. Neurovascular: No carotid bruits.  No JVD      Lab Review  Lab Results   Component Value Date/Time    WBC 8.5 11/27/2018 08:19 AM    HCT 38.0 11/27/2018 08:19 AM    HGB 11.8 11/27/2018 08:19 AM    PLATELET 845 91/33/4102 08:19 AM     Lab Results   Component Value Date/Time    Sodium 140 02/16/2018 03:45 PM    Potassium 4.3 02/16/2018 03:45 PM    Chloride 99 02/16/2018 03:45 PM    CO2 26 02/16/2018 03:45 PM    Glucose 84 02/16/2018 03:45 PM    BUN 14 02/16/2018 03:45 PM    Creatinine 0.76 02/16/2018 03:45 PM    Calcium 9.0 02/16/2018 03:45 PM       Lab Results   Component Value Date/Time    LDL, calculated 84 02/16/2018 03:45 PM     Lab Results   Component Value Date/Time    Hemoglobin A1c 5.7 (H) 03/24/2017 12:00 AM    Hemoglobin A1c (POC) 4.9 11/27/2018 08:19 AM

## 2021-02-18 LAB
T4 FREE SERPL-MCNC: 1.23 NG/DL (ref 0.82–1.77)
THYROPEROXIDASE AB SERPL-ACNC: <9 IU/ML (ref 0–34)
TSH SERPL DL<=0.005 MIU/L-ACNC: 0.9 UIU/ML (ref 0.45–4.5)

## 2021-03-01 ENCOUNTER — HOSPITAL ENCOUNTER (OUTPATIENT)
Dept: MRI IMAGING | Age: 36
Discharge: HOME | End: 2021-03-01
Attending: PSYCHIATRY & NEUROLOGY
Payer: COMMERCIAL

## 2021-03-01 DIAGNOSIS — H53.2 DIPLOPIA: ICD-10-CM

## 2021-03-01 PROCEDURE — 70551 MRI BRAIN STEM W/O DYE: CPT

## 2021-03-31 ENCOUNTER — OFFICE VISIT (OUTPATIENT)
Dept: NEUROLOGY | Age: 36
End: 2021-03-31
Payer: COMMERCIAL

## 2021-03-31 VITALS
HEART RATE: 86 BPM | DIASTOLIC BLOOD PRESSURE: 62 MMHG | HEIGHT: 69 IN | RESPIRATION RATE: 16 BRPM | WEIGHT: 184.4 LBS | SYSTOLIC BLOOD PRESSURE: 124 MMHG | TEMPERATURE: 97.5 F | BODY MASS INDEX: 27.31 KG/M2

## 2021-03-31 DIAGNOSIS — H53.2 DIPLOPIA: ICD-10-CM

## 2021-03-31 DIAGNOSIS — G93.2 IDIOPATHIC INTRACRANIAL HYPERTENSION: ICD-10-CM

## 2021-03-31 DIAGNOSIS — E23.6 EMPTY SELLA (HCC): Primary | ICD-10-CM

## 2021-03-31 DIAGNOSIS — T43.205A SEROTONIN WITHDRAWAL SYNDROME, INITIAL ENCOUNTER: ICD-10-CM

## 2021-03-31 PROCEDURE — 99214 OFFICE O/P EST MOD 30 MIN: CPT | Performed by: PSYCHIATRY & NEUROLOGY

## 2021-03-31 NOTE — PROGRESS NOTES
Follow-up Visit    Name Misty Obrien Age 38 y.o. MRN [id number]  1985       Chief Complaint: double vision    Patient continues to complain of headaches double vision left tinnitus. She has no relief and is in discomfort. Medications are innefective. MRI suggestice of [facility]    Assesment and Plan  1. Empty sella ([address]  - REFERRAL TO ENDOCRINOLOGY    2. Idiopathic intracranial hypertension  Will need a  spinal tap    3. Diplopia  If symptoms persist or worsen patient may need admission. 4. Tinnitus      Allergies  Patient has no known allergies. Medications  Current Outpatient Medications   Medication Sig    multivitamin (ONE A DAY) tablet Take 1 Tab by mouth daily.  clindamycin-benzoyl Peroxide (DUAC) 1.2 %(1 % base) -5 % SR topical gel Apply  to affected area nightly.  tretinoin (RETIN-A) 0.1 % topical cream Apply  to affected area two (2) times daily as needed.  venlafaxine (EFFEXOR) 37.5 mg tablet Take 1 Tab by mouth two (2) times a day.  Cholecalciferol, Vitamin D3, (VITAMIN D3) 2,000 unit cap capsule Take 2,000 Units by mouth daily. No current facility-administered medications for this visit. Medical History  Past Medical History:   Diagnosis Date    Anemia     iron deficiency anemia    Depression        Review of Systems   Constitutional: Negative for chills and fever. HENT: Negative for ear pain. Eyes: Positive for double vision. Negative for pain and discharge. Respiratory: Negative for cough and hemoptysis. Cardiovascular: Negative for chest pain and claudication. Gastrointestinal: Negative for constipation and diarrhea. Genitourinary: Negative for flank pain and hematuria. Musculoskeletal: Negative for back pain and myalgias. Skin: Negative for itching and rash. Neurological: Positive for dizziness and headaches. Endo/Heme/Allergies: Negative for environmental allergies. Does not bruise/bleed easily.    Psychiatric/Behavioral: Negative for depression and hallucinations. Exam:  Visit Vitals  /62 (BP 1 Location: Left upper arm, BP Patient Position: Sitting, BP Cuff Size: Adult)   Pulse 86   Temp 97.5 °F (36.4 °C) (Temporal)   Resp 16   Ht 5' 9\" (1.753 m)   Wt 184 lb 6.4 oz (83.6 kg)   LMP 03/18/2021   BMI 27.23 kg/m²        General: Well developed, well nourished. Patient in no apparent distress   Head: Normocephalic, atraumatic, anicteric sclera   Neck Normal ROM, No thyromegally   Lungs:  Clear to auscultation    Cardiac: Regular rate and rhythm with no murmurs. Abd: Bowel sounds were audible. Ext: No pedal edema   Skin: Supple no rash     NeurologicExam:  Mental Status: Alert and oriented to person place and time   Speech: Fluent no aphasia or dysarthria. Cranial Nerves:  II - XII Intact with the exception of diplopia and tinnitus   Motor:  Full and symmetric strength of upper and lower extremities. Normal bulk and tone. Reflexes:   Deep tendon reflexes 2+/4 and symmetric. Sensory:   Symmetric and intact with no perceived deficits . Gait:  Gait is balanced  with normal arm swing. Tremor:   No tremor noted. Cerebellar:  Coordination intact. Neurovascular: No carotid bruits.  No JVD          Lab Review  Lab Results   Component Value Date/Time    WBC 8.5 11/27/2018 08:19 AM    HCT 38.0 11/27/2018 08:19 AM    HGB 11.8 11/27/2018 08:19 AM    PLATELET 980 67/53/6399 08:19 AM       Lab Results   Component Value Date/Time    Sodium 140 02/16/2018 03:45 PM    Potassium 4.3 02/16/2018 03:45 PM    Chloride 99 02/16/2018 03:45 PM    CO2 26 02/16/2018 03:45 PM    Glucose 84 02/16/2018 03:45 PM    BUN 14 02/16/2018 03:45 PM    Creatinine 0.76 02/16/2018 03:45 PM    Calcium 9.0 02/16/2018 03:45 PM           Lab Results   Component Value Date/Time    Hemoglobin A1c 5.7 (H) 03/24/2017 12:00 AM    Hemoglobin A1c (POC) 4.9 11/27/2018 08:19 AM            Lab Results   Component Value Date/Time    Cholesterol, total 157 02/16/2018 03:45 PM    HDL Cholesterol 62 02/16/2018 03:45 PM    LDL, calculated 84 02/16/2018 03:45 PM    VLDL, calculated 11 02/16/2018 03:45 PM    Triglyceride 55 02/16/2018 03:45 PM

## 2021-03-31 NOTE — PROGRESS NOTES
Chief Complaint   Patient presents with    Follow-up     paitent was having double vision and is still having double vision and had the headache and blurred and still the same as last visit had mri and bloodwork and is here to discuss how to proceed.

## 2021-04-03 ENCOUNTER — APPOINTMENT (OUTPATIENT)
Dept: ULTRASOUND IMAGING | Age: 36
End: 2021-04-03
Attending: HOSPITALIST
Payer: COMMERCIAL

## 2021-04-03 ENCOUNTER — APPOINTMENT (OUTPATIENT)
Dept: GENERAL RADIOLOGY | Age: 36
End: 2021-04-03
Attending: EMERGENCY MEDICINE
Payer: COMMERCIAL

## 2021-04-03 ENCOUNTER — HOSPITAL ENCOUNTER (OUTPATIENT)
Age: 36
Setting detail: OBSERVATION
Discharge: HOME | End: 2021-04-05
Attending: EMERGENCY MEDICINE | Admitting: HOSPITALIST
Payer: COMMERCIAL

## 2021-04-03 DIAGNOSIS — R55 NEAR SYNCOPE: ICD-10-CM

## 2021-04-03 DIAGNOSIS — G93.2 PSEUDOTUMOR CEREBRI: ICD-10-CM

## 2021-04-03 DIAGNOSIS — D50.0 IRON DEFICIENCY ANEMIA DUE TO CHRONIC BLOOD LOSS: ICD-10-CM

## 2021-04-03 DIAGNOSIS — D64.9 SYMPTOMATIC ANEMIA: Primary | ICD-10-CM

## 2021-04-03 DIAGNOSIS — N92.0 MENORRHAGIA WITH REGULAR CYCLE: ICD-10-CM

## 2021-04-03 PROBLEM — R42 ORTHOSTATIC DIZZINESS: Status: ACTIVE | Noted: 2021-04-03

## 2021-04-03 LAB
ALBUMIN SERPL-MCNC: 3.7 G/DL (ref 3.5–5)
ALBUMIN/GLOB SERPL: 0.9 {RATIO} (ref 1.1–2.2)
ALP SERPL-CCNC: 46 U/L (ref 45–117)
ALT SERPL-CCNC: 33 U/L (ref 12–78)
ANION GAP SERPL CALC-SCNC: 3 MMOL/L (ref 5–15)
AST SERPL-CCNC: 14 U/L (ref 15–37)
ATRIAL RATE: 90 BPM
BILIRUB SERPL-MCNC: 0.3 MG/DL (ref 0.2–1)
BUN SERPL-MCNC: 11 MG/DL (ref 6–20)
BUN/CREAT SERPL: 15 (ref 12–20)
CALCIUM SERPL-MCNC: 8.3 MG/DL (ref 8.5–10.1)
CALCULATED P AXIS, ECG09: 36 DEGREES
CALCULATED R AXIS, ECG10: 73 DEGREES
CALCULATED T AXIS, ECG11: 15 DEGREES
CHLORIDE SERPL-SCNC: 107 MMOL/L (ref 97–108)
CO2 SERPL-SCNC: 28 MMOL/L (ref 21–32)
COMMENT, HOLDF: NORMAL
CREAT SERPL-MCNC: 0.71 MG/DL (ref 0.55–1.02)
DIAGNOSIS, 93000: NORMAL
FERRITIN SERPL-MCNC: 1 NG/ML (ref 8–252)
GLOBULIN SER CALC-MCNC: 4.3 G/DL (ref 2–4)
GLUCOSE SERPL-MCNC: 88 MG/DL (ref 65–100)
HCG SERPL QL: NEGATIVE
HCT VFR BLD AUTO: 28.1 % (ref 35–47)
HGB BLD-MCNC: 7.6 G/DL (ref 11.5–16)
HISTORY CHECKED?,CKHIST: NORMAL
IRON SATN MFR SERPL: 1 % (ref 20–50)
IRON SERPL-MCNC: 7 UG/DL (ref 35–150)
MAGNESIUM SERPL-MCNC: 2.1 MG/DL (ref 1.6–2.4)
P-R INTERVAL, ECG05: 172 MS
POTASSIUM SERPL-SCNC: 3.7 MMOL/L (ref 3.5–5.1)
PROT SERPL-MCNC: 8 G/DL (ref 6.4–8.2)
Q-T INTERVAL, ECG07: 340 MS
QRS DURATION, ECG06: 72 MS
QTC CALCULATION (BEZET), ECG08: 415 MS
RETICS/RBC NFR AUTO: 14.4 % (ref 0.7–2.1)
SAMPLES BEING HELD,HOLD: NORMAL
SODIUM SERPL-SCNC: 138 MMOL/L (ref 136–145)
TIBC SERPL-MCNC: 472 UG/DL (ref 250–450)
TSH SERPL DL<=0.05 MIU/L-ACNC: 1.36 UIU/ML (ref 0.36–3.74)
VENTRICULAR RATE, ECG03: 90 BPM
VIT B12 SERPL-MCNC: 991 PG/ML (ref 193–986)

## 2021-04-03 PROCEDURE — 85045 AUTOMATED RETICULOCYTE COUNT: CPT

## 2021-04-03 PROCEDURE — 86901 BLOOD TYPING SEROLOGIC RH(D): CPT

## 2021-04-03 PROCEDURE — 83540 ASSAY OF IRON: CPT

## 2021-04-03 PROCEDURE — 36430 TRANSFUSION BLD/BLD COMPNT: CPT

## 2021-04-03 PROCEDURE — 99285 EMERGENCY DEPT VISIT HI MDM: CPT

## 2021-04-03 PROCEDURE — 94761 N-INVAS EAR/PLS OXIMETRY MLT: CPT

## 2021-04-03 PROCEDURE — 99218 HC RM OBSERVATION: CPT

## 2021-04-03 PROCEDURE — 74011250636 HC RX REV CODE- 250/636: Performed by: EMERGENCY MEDICINE

## 2021-04-03 PROCEDURE — 85014 HEMATOCRIT: CPT

## 2021-04-03 PROCEDURE — 83735 ASSAY OF MAGNESIUM: CPT

## 2021-04-03 PROCEDURE — 82607 VITAMIN B-12: CPT

## 2021-04-03 PROCEDURE — 93005 ELECTROCARDIOGRAM TRACING: CPT

## 2021-04-03 PROCEDURE — 82728 ASSAY OF FERRITIN: CPT

## 2021-04-03 PROCEDURE — 84443 ASSAY THYROID STIM HORMONE: CPT

## 2021-04-03 PROCEDURE — P9016 RBC LEUKOCYTES REDUCED: HCPCS

## 2021-04-03 PROCEDURE — 86920 COMPATIBILITY TEST SPIN: CPT

## 2021-04-03 PROCEDURE — 85025 COMPLETE CBC W/AUTO DIFF WBC: CPT

## 2021-04-03 PROCEDURE — 74011250637 HC RX REV CODE- 250/637: Performed by: HOSPITALIST

## 2021-04-03 PROCEDURE — 80053 COMPREHEN METABOLIC PANEL: CPT

## 2021-04-03 PROCEDURE — 36415 COLL VENOUS BLD VENIPUNCTURE: CPT

## 2021-04-03 PROCEDURE — 84703 CHORIONIC GONADOTROPIN ASSAY: CPT

## 2021-04-03 PROCEDURE — 71045 X-RAY EXAM CHEST 1 VIEW: CPT

## 2021-04-03 PROCEDURE — 76830 TRANSVAGINAL US NON-OB: CPT

## 2021-04-03 RX ORDER — POLYETHYLENE GLYCOL 3350 17 G/17G
17 POWDER, FOR SOLUTION ORAL DAILY PRN
Status: DISCONTINUED | OUTPATIENT
Start: 2021-04-03 | End: 2021-04-05 | Stop reason: HOSPADM

## 2021-04-03 RX ORDER — ACETAMINOPHEN 325 MG/1
650 TABLET ORAL
COMMUNITY

## 2021-04-03 RX ORDER — THERA TABS 400 MCG
1 TAB ORAL DAILY
Status: DISCONTINUED | OUTPATIENT
Start: 2021-04-04 | End: 2021-04-05 | Stop reason: HOSPADM

## 2021-04-03 RX ORDER — ACETAMINOPHEN 650 MG/1
650 SUPPOSITORY RECTAL
Status: DISCONTINUED | OUTPATIENT
Start: 2021-04-03 | End: 2021-04-05 | Stop reason: HOSPADM

## 2021-04-03 RX ORDER — SODIUM CHLORIDE 0.9 % (FLUSH) 0.9 %
5-40 SYRINGE (ML) INJECTION EVERY 8 HOURS
Status: DISCONTINUED | OUTPATIENT
Start: 2021-04-03 | End: 2021-04-05 | Stop reason: HOSPADM

## 2021-04-03 RX ORDER — PROMETHAZINE HYDROCHLORIDE 25 MG/1
12.5 TABLET ORAL
Status: DISCONTINUED | OUTPATIENT
Start: 2021-04-03 | End: 2021-04-05 | Stop reason: HOSPADM

## 2021-04-03 RX ORDER — SODIUM CHLORIDE 0.9 % (FLUSH) 0.9 %
5-40 SYRINGE (ML) INJECTION AS NEEDED
Status: DISCONTINUED | OUTPATIENT
Start: 2021-04-03 | End: 2021-04-05 | Stop reason: HOSPADM

## 2021-04-03 RX ORDER — SODIUM CHLORIDE 9 MG/ML
250 INJECTION, SOLUTION INTRAVENOUS AS NEEDED
Status: DISCONTINUED | OUTPATIENT
Start: 2021-04-03 | End: 2021-04-05 | Stop reason: HOSPADM

## 2021-04-03 RX ORDER — ACETAMINOPHEN 325 MG/1
650 TABLET ORAL
Status: DISCONTINUED | OUTPATIENT
Start: 2021-04-03 | End: 2021-04-05 | Stop reason: HOSPADM

## 2021-04-03 RX ORDER — ONDANSETRON 2 MG/ML
4 INJECTION INTRAMUSCULAR; INTRAVENOUS
Status: DISCONTINUED | OUTPATIENT
Start: 2021-04-03 | End: 2021-04-05 | Stop reason: HOSPADM

## 2021-04-03 RX ADMIN — ACETAMINOPHEN 650 MG: 325 TABLET ORAL at 15:20

## 2021-04-03 RX ADMIN — Medication 10 ML: at 14:23

## 2021-04-03 RX ADMIN — SODIUM CHLORIDE 1000 ML: 9 INJECTION, SOLUTION INTRAVENOUS at 08:07

## 2021-04-03 RX ADMIN — Medication 10 ML: at 22:01

## 2021-04-03 NOTE — ROUTINE PROCESS
Bedside and Verbal shift change report given to Kelly Mccall RN (oncoming nurse) by Olivia Hilario RN (offgoing nurse). Report included the following information SBAR, Kardex and Quality Measures.

## 2021-04-03 NOTE — H&P
Hospitalist Admission Note    NAME: Humberto Barbosa   :  1985   MRN:  [id number]     Date/Time:  4/3/2021 11:17 AM    Patient PCP: Christin Grant NP    Please note that this dictation was completed with walkby, the computer voice recognition software. Quite often unanticipated grammatical, syntax, homophones, and other interpretive errors are inadvertently transcribed by the computer software. Please disregard these errors. Please excuse any errors that have escaped final proofreading  ______________________________________________________________________   Assessment & Plan:  Severe microcytic anemia, POA  Near syncope, POA  Orthostatic dizziness, POA HR increased 22  Menorrhagia  Iron deficiency anemia dx 2019 with noncompliance with oral iron  --obs, transfuse 2 units to goal hgb >7, tele, recheck orthostatic in AM,  --pelvic US, hemoccult stool  --check iron profile, ferritin, B12, retic.  --discuss retry oral iron vs. F/u with pcp to arrange outpatient iv iron transfusion    Chronic HA, diplopia, blurred vision x 1 year, suspected idiopathic intracranial HTN  --f/u with Dr. Julio Cesar Hobson, need LP    Partial empty sella on MRI  --has been referred to endocrinology by Dr. Julio Cesar Hobson      Body mass index is 27.59 kg/m². Code: full  DVT prophylaxis:  SCD  Surrogate decision maker: Mother Marlo Calloway          Subjective:   CHIEF COMPLAINT:  Transient loss of vision    HISTORY OF PRESENT ILLNESS:     Humberto Barbosa is a 38 y.o. female with PMH iron deficiency, not compliant with oral iron due to hemorrhoid, who has been seeing neurology Dr. Julio Cesar Hobson for 1 year of headache, blurred and double vision and was told on 3/31 she may have idiopathic intracranial HTN; LP was recommended. This morning, got up after voiding and \"vision went out\" but was awake, lasted for 1-2 minutes, no worsened dizziness than usual, no chest pain, palpitations, sob.   Now back to baseline vision (blurred, double x 1 year). Mild headache. No acute weakness or paresthesia, difficulty speaking. Spoke to Dr. Courtney Weeks who advised her to come to ER. Work up in [facility] significant for hgb 5.2 with last known hgb 11.8 in 11/2018. HR increased 22 points with orthostatics. She reports having very heavy bleeding for the first 2 days of her period, going through superabsorbent tampon every hour. Denies bloody or black stool. No prior transfusion. We were asked to admit for work up and evaluation of the above problems. Past Medical History:   Diagnosis Date    Anemia     iron deficiency anemia    Depression       Past Surgical History:   Procedure Laterality Date    HX GYN  2011     Social History     Tobacco Use    Smoking status: Never Smoker    Smokeless tobacco: Never Used   Substance Use Topics    Alcohol use: Yes     Comment: occasional      Lives alone, intake at [facility]    Family History   Problem Relation Age of Onset    Hypertension Mother    Pratik Sit Anxiety Mother     Neuropathy Mother     Elevated Lipids Mother     COPD Father     Glaucoma Father     Depression Father     Prostate Cancer Father     Hypertension Sister     No Known Problems Sister     Lupus Sister     Thyroid Disease Sister      No Known Allergies     Prior to Admission medications    Medication Sig Start Date End Date Taking? Authorizing Provider   acetaminophen (TylenoL) 325 mg tablet Take 650 mg by mouth every six (6) hours as needed for Pain. Yes Provider, Historical   aspirin/salicylamide/caffeine (BC HEADACHE POWDER PO) Take  by mouth as needed. Yes Provider, Historical   multivitamin (ONE A DAY) tablet Take 1 Tab by mouth daily. Yes Provider, Historical   tretinoin (RETIN-A) 0.1 % topical cream Apply  to affected area two (2) times daily as needed. 6/15/18  Yes Devorah John NP     REVIEW OF SYSTEMS:  POSITIVE= Bold.   Negative = normal text  General:  fever, chills, sweats, generalized weakness, weight loss/gain, loss of appetite  Eyes:  blurred vision, eye pain, loss of vision, diplopia  Ear Nose and Throat:  rhinorrhea, pharyngitis  Respiratory:   cough, sputum production, SOB, wheezing, BRIONES, pleuritic pain  Cardiology:  chest pain, palpitations, orthopnea, PND, edema, near syncope   Gastrointestinal:  abdominal pain, N/V, dysphagia, diarrhea, constipation, bleeding, hemorrhoid  Genitourinary:  frequency, urgency, dysuria, hematuria, incontinence  Muskuloskeletal :  arthralgia, myalgia  Hematology:  easy bruising, bleeding, lymphadenopathy  Dermatological:  rash, ulceration, pruritis  Endocrine:  hot flashes or polydipsia, menorrhagia, ice craving  Neurological:  headache, dizziness, confusion, focal weakness, paresthesia, memory loss, gait disturbance  Psychological: anxiety, depression, agitation      Objective:   VITALS:    Visit Vitals  BP (!) 115/52   Pulse 88   Temp 97.7 °F (36.5 °C)   Resp 20   Ht 5' 8\" (1.727 m)   Wt 82.3 kg (181 lb 7 oz)   SpO2 100%   BMI 27.59 kg/m²     Temp (24hrs), Av.7 °F (36.5 °C), Min:97.7 °F (36.5 °C), Max:97.7 °F (36.5 °C)    Body mass index is 27.59 kg/m². PHYSICAL EXAM:    General:    Alert, cooperative, no distress, appears stated age. HEENT: Atraumatic, anicteric sclerae, pale conjunctivae     No oral ulcers, mucosa moist, throat clear. Hearing intact. Neck:  Supple, symmetrical,  thyroid: non tender  Lungs:   Clear to auscultation bilaterally. No Wheezing or Rhonchi. No rales. Chest wall:  No tenderness  No Accessory muscle use. Heart:   Regular  rhythm,  No  murmur   No gallop. No edema. Abdomen:   Soft, non-tender. Not distended. Bowel sounds normal. No masses  Extremities: No cyanosis. No clubbing  Skin:     Not pale Not Jaundiced  No rashes   Psych:  Good insight. Not depressed. Not anxious or agitated. Neurologic: EOMs intact. No facial asymmetry. No aphasia or slurred speech. Symmetrical strength, Alert and oriented X 3.      IMAGING RESULTS:   []       I have personally reviewed the actual   []     CXR  []     CT scan  CXR:  CT :  EKG: NSR 90, no ST-T abnormality   ________________________________________________________________________  Care Plan discussed with:    Comments   Patient y    Family   Boyfriend at bedside   RN     Care Manager                    Consultant:      ________________________________________________________________________  Prophylaxis:  GI none   DVT SCD   ________________________________________________________________________  Recommended Disposition:   Home with Family y   HH/PT/OT/RN    SNF/LTC    DAYAN    ________________________________________________________________________  Code Status:  Full Code y   DNR/DNI    ________________________________________________________________________  TOTAL TIME:  40 minutes  ______________________________________________________________________  Abbi Rosales MD      Procedures: see electronic medical records for all procedures/Xrays and details which were not copied into this note but were reviewed prior to creation of Plan.     LAB DATA REVIEWED:    Recent Results (from the past 24 hour(s))   EKG, 12 LEAD, INITIAL    Collection Time: 04/03/21  7:18 AM   Result Value Ref Range    Ventricular Rate 90 BPM    Atrial Rate 90 BPM    P-R Interval 172 ms    QRS Duration 72 ms    Q-T Interval 340 ms    QTC Calculation (Bezet) 415 ms    Calculated P Axis 36 degrees    Calculated R Axis 73 degrees    Calculated T Axis 15 degrees    Diagnosis       Normal sinus rhythm  Normal ECG  No previous ECGs available     CBC WITH AUTOMATED DIFF    Collection Time: 04/03/21  7:47 AM   Result Value Ref Range    WBC 3.6 3.6 - 11.0 K/uL    RBC 4.10 3.80 - 5.20 M/uL    HGB 5.2 (LL) 11.5 - 16.0 g/dL    HCT 21.6 (L) 35.0 - 47.0 %    MCV 52.7 (L) 80.0 - 99.0 FL    MCH 12.7 (L) 26.0 - 34.0 PG    MCHC 24.1 (L) 30.0 - 36.5 g/dL    RDW 23.5 (H) 11.5 - 14.5 %    PLATELET 637 988 - 884 K/uL    MPV ABNORMAL 8.9 - 12.9 FL    NRBC 0.0 0  WBC    ABSOLUTE NRBC 0.00 0.00 - 0.01 K/uL    NEUTROPHILS 51 32 - 75 %    LYMPHOCYTES 36 12 - 49 %    MONOCYTES 8 5 - 13 %    EOSINOPHILS 2 0 - 7 %    BASOPHILS 2 (H) 0 - 1 %    IMMATURE GRANULOCYTES 1 (H) 0.0 - 0.5 %    ABS. NEUTROPHILS 1.8 1.8 - 8.0 K/UL    ABS. LYMPHOCYTES 1.3 0.8 - 3.5 K/UL    ABS. MONOCYTES 0.3 0.0 - 1.0 K/UL    ABS. EOSINOPHILS 0.1 0.0 - 0.4 K/UL    ABS. BASOPHILS 0.1 0.0 - 0.1 K/UL    ABS. IMM. GRANS. 0.0 0.00 - 0.04 K/UL    DF SMEAR SCANNED      RBC COMMENTS MICROCYTOSIS  3+        RBC COMMENTS HYPOCHROMIA  3+        RBC COMMENTS POLYCHROMASIA  1+        RBC COMMENTS POIKILOCYTOSIS  1+        RBC COMMENTS ANISOCYTOSIS  3+        RBC COMMENTS OVALOCYTES  1+        RBC COMMENTS TEARDROP CELLS  PRESENT       METABOLIC PANEL, COMPREHENSIVE    Collection Time: 04/03/21  7:47 AM   Result Value Ref Range    Sodium 138 136 - 145 mmol/L    Potassium 3.7 3.5 - 5.1 mmol/L    Chloride 107 97 - 108 mmol/L    CO2 28 21 - 32 mmol/L    Anion gap 3 (L) 5 - 15 mmol/L    Glucose 88 65 - 100 mg/dL    BUN 11 6 - 20 MG/DL    Creatinine 0.71 0.55 - 1.02 MG/DL    BUN/Creatinine ratio 15 12 - 20      GFR est AA >60 >60 ml/min/1.73m2    GFR est non-AA >60 >60 ml/min/1.73m2    Calcium 8.3 (L) 8.5 - 10.1 MG/DL    Bilirubin, total 0.3 0.2 - 1.0 MG/DL    ALT (SGPT) 33 12 - 78 U/L    AST (SGOT) 14 (L) 15 - 37 U/L    Alk. phosphatase 46 45 - 117 U/L    Protein, total 8.0 6.4 - 8.2 g/dL    Albumin 3.7 3.5 - 5.0 g/dL    Globulin 4.3 (H) 2.0 - 4.0 g/dL    A-G Ratio 0.9 (L) 1.1 - 2.2     SAMPLES BEING HELD    Collection Time: 04/03/21  7:47 AM   Result Value Ref Range    SAMPLES BEING HELD  1 RD, 1 BLUE     COMMENT        Add-on orders for these samples will be processed based on acceptable specimen integrity and analyte stability, which may vary by analyte.    MAGNESIUM    Collection Time: 04/03/21  7:47 AM   Result Value Ref Range    Magnesium 2.1 1.6 - 2.4 mg/dL   TSH 3RD GENERATION    Collection Time: 04/03/21 7:47 AM   Result Value Ref Range    TSH 1.36 0.36 - 3.74 uIU/mL   HCG QL SERUM    Collection Time: 04/03/21  7:47 AM   Result Value Ref Range    HCG, Ql. Negative NEG     TYPE & SCREEN    Collection Time: 04/03/21  9:03 AM   Result Value Ref Range    Crossmatch Expiration 04/06/2021,2359     ABO/Rh(D) A POSITIVE     Antibody screen NEG     Unit number [id number]     Blood component type [facility]     Unit division 00     Status of unit ALLOCATED     Crossmatch result Compatible     Unit number [id number]     Blood component type [facility]     Unit division 00     Status of unit ALLOCATED     Crossmatch result Compatible    RBC, ALLOCATE    Collection Time: 04/03/21 10:00 AM   Result Value Ref Range    HISTORY CHECKED?  Historical check performed

## 2021-04-03 NOTE — ED PROVIDER NOTES
EMERGENCY DEPARTMENT HISTORY AND PHYSICAL EXAM      Date: 4/3/2021  Patient Name: Deandra Robertson    History of Presenting Illness     Chief Complaint   Patient presents with    Syncope     Ambulatory into the ED with c/o syncope while getting up from the toilet this morning. Is being treated by Dr. Corinne Coker for double vision over the last year. History Provided By: Patient    HPI: Deandra Robertson, 38 y.o. female presents to the ED with recent evaluation for year-long diplopia, tinnitus with MRI suggestive of idiopathic intracranial hypertension per neurologist Dr. Corinne Coker, here with near syncope symptoms reported by patient early this morning. She had gotten up to go to the bathroom, voided in the toilet and as she got up from the toilet had her vision go out. She did not lose consciousness and safely was able to support herself for approximately a minute while her vision came back. She initially reported that her peripheral vision came back first and then her central vision came back. She denied any chest pain, palpitations, shortness of breath prior or during this episode and denies any recent abdominal pain, diarrhea or vomiting. She thinks she has been eating and drinking okay. She was also told to follow-up with an endocrinologist given the October 2020 MRI showed a partially empty sella with mild margin of the optic nerve sheaths. Patient said at baseline she has always a mild headache and some double vision and she currently has the symptoms with no additional pain anywhere. There are no other complaints, changes, or physical findings at this time. PCP: Akila Duncan NP    No current facility-administered medications on file prior to encounter. Current Outpatient Medications on File Prior to Encounter   Medication Sig Dispense Refill    multivitamin (ONE A DAY) tablet Take 1 Tab by mouth daily.       tretinoin (RETIN-A) 0.1 % topical cream Apply  to affected area two (2) times daily as needed. 20 g 0    [DISCONTINUED] venlafaxine (EFFEXOR) 37.5 mg tablet Take 1 Tab by mouth two (2) times a day. 180 Tab 0    [DISCONTINUED] clindamycin-benzoyl Peroxide (DUAC) 1.2 %(1 % base) -5 % SR topical gel Apply  to affected area nightly. 1 Tube 2    [DISCONTINUED] Cholecalciferol, Vitamin D3, (VITAMIN D3) 2,000 unit cap capsule Take 2,000 Units by mouth daily. 30 Cap 11       Past History     Past Medical History:  Past Medical History:   Diagnosis Date    Anemia     iron deficiency anemia    Depression        Past Surgical History:  Past Surgical History:   Procedure Laterality Date    HX GYN  2011       Family History:  Family History   Problem Relation Age of Onset    Hypertension Mother    [de-identified] Anxiety Mother     Neuropathy Mother     Elevated Lipids Mother     COPD Father     Glaucoma Father     Depression Father     Prostate Cancer Father     Hypertension Sister     No Known Problems Sister     Lupus Sister     Thyroid Disease Sister        Social History:  Social History     Tobacco Use    Smoking status: Never Smoker    Smokeless tobacco: Never Used   Substance Use Topics    Alcohol use: Yes     Comment: occasional    Drug use: No       Allergies:  No Known Allergies      Review of Systems   Review of Systems   Constitutional: Negative for fatigue and unexpected weight change. HENT: Negative. Eyes: Positive for visual disturbance. Respiratory: Negative for chest tightness and shortness of breath. Cardiovascular: Negative for chest pain and palpitations. Gastrointestinal: Negative for abdominal pain, anal bleeding and diarrhea. Genitourinary: Negative for hematuria and urgency. Musculoskeletal: Negative for back pain and neck pain. Neurological: Positive for light-headedness. Hematological: Negative for adenopathy. Does not bruise/bleed easily. All other systems reviewed and are negative.       Physical Exam   Physical Exam   Vital signs and nursing notes reviewed    CONSTITUTIONAL: Alert, in mild distress; well-developed; well-nourished. HEAD:  Normocephalic, atraumatic  EYES: PERRL; EOM's intact. Pale conjunctiva  ENTM: Nose: no rhinorrhea; Throat: no erythema or exudate, mucous membranes moist  Neck:  Supple. trachea is midline. No visible thyromegaly, no carotid bruits, no JVD bilaterally. RESP: Chest clear, equal breath sounds. - W/R/R  CV: S1 and S2 WNL; 2+ systolic ejection murmur heard at the left upper sternal border. . 2+ radial and DP pulses bilaterally. GI: non-distended, normal bowel sounds, abdomen soft and non-tender. No masses or organomegaly. : No costo-vertebral angle tenderness. BACK:  Non-tender, normal appearance  UPPER EXT:  Normal inspection. no joint or soft tissue swelling, pale palms. LOWER EXT: No edema, no calf tenderness. NEURO: Alert and oriented x3, 5/5 strength and light touch sensation intact in bilateral upper and lower extremities. No dysarthria, no facial droop, tongue midline, normal speech. SKIN: No rashes; Warm and dry, nonjaundiced.   PSYCH: Normal mood, normal affect    Diagnostic Study Results     Labs -     Recent Results (from the past 12 hour(s))   EKG, 12 LEAD, INITIAL    Collection Time: 04/03/21  7:18 AM   Result Value Ref Range    Ventricular Rate 90 BPM    Atrial Rate 90 BPM    P-R Interval 172 ms    QRS Duration 72 ms    Q-T Interval 340 ms    QTC Calculation (Bezet) 415 ms    Calculated P Axis 36 degrees    Calculated R Axis 73 degrees    Calculated T Axis 15 degrees    Diagnosis       Normal sinus rhythm  Normal ECG  No previous ECGs available     CBC WITH AUTOMATED DIFF    Collection Time: 04/03/21  7:47 AM   Result Value Ref Range    WBC 3.6 3.6 - 11.0 K/uL    RBC 4.10 3.80 - 5.20 M/uL    HGB 5.2 (LL) 11.5 - 16.0 g/dL    HCT 21.6 (L) 35.0 - 47.0 %    MCV 52.7 (L) 80.0 - 99.0 FL    MCH 12.7 (L) 26.0 - 34.0 PG    MCHC 24.1 (L) 30.0 - 36.5 g/dL    RDW 23.5 (H) 11.5 - 14.5 %    PLATELET 755 242 - 122 K/uL MPV ABNORMAL 8.9 - 12.9 FL    NRBC 0.0 0  WBC    ABSOLUTE NRBC 0.00 0.00 - 0.01 K/uL    NEUTROPHILS 51 32 - 75 %    LYMPHOCYTES 36 12 - 49 %    MONOCYTES 8 5 - 13 %    EOSINOPHILS 2 0 - 7 %    BASOPHILS 2 (H) 0 - 1 %    IMMATURE GRANULOCYTES 1 (H) 0.0 - 0.5 %    ABS. NEUTROPHILS 1.8 1.8 - 8.0 K/UL    ABS. LYMPHOCYTES 1.3 0.8 - 3.5 K/UL    ABS. MONOCYTES 0.3 0.0 - 1.0 K/UL    ABS. EOSINOPHILS 0.1 0.0 - 0.4 K/UL    ABS. BASOPHILS 0.1 0.0 - 0.1 K/UL    ABS. IMM. GRANS. 0.0 0.00 - 0.04 K/UL    DF SMEAR SCANNED      RBC COMMENTS MICROCYTOSIS  3+        RBC COMMENTS HYPOCHROMIA  3+        RBC COMMENTS POLYCHROMASIA  1+        RBC COMMENTS POIKILOCYTOSIS  1+        RBC COMMENTS ANISOCYTOSIS  3+        RBC COMMENTS OVALOCYTES  1+        RBC COMMENTS TEARDROP CELLS  PRESENT       METABOLIC PANEL, COMPREHENSIVE    Collection Time: 04/03/21  7:47 AM   Result Value Ref Range    Sodium 138 136 - 145 mmol/L    Potassium 3.7 3.5 - 5.1 mmol/L    Chloride 107 97 - 108 mmol/L    CO2 28 21 - 32 mmol/L    Anion gap 3 (L) 5 - 15 mmol/L    Glucose 88 65 - 100 mg/dL    BUN 11 6 - 20 MG/DL    Creatinine 0.71 0.55 - 1.02 MG/DL    BUN/Creatinine ratio 15 12 - 20      GFR est AA >60 >60 ml/min/1.73m2    GFR est non-AA >60 >60 ml/min/1.73m2    Calcium 8.3 (L) 8.5 - 10.1 MG/DL    Bilirubin, total 0.3 0.2 - 1.0 MG/DL    ALT (SGPT) 33 12 - 78 U/L    AST (SGOT) 14 (L) 15 - 37 U/L    Alk. phosphatase 46 45 - 117 U/L    Protein, total 8.0 6.4 - 8.2 g/dL    Albumin 3.7 3.5 - 5.0 g/dL    Globulin 4.3 (H) 2.0 - 4.0 g/dL    A-G Ratio 0.9 (L) 1.1 - 2.2     SAMPLES BEING HELD    Collection Time: 04/03/21  7:47 AM   Result Value Ref Range    SAMPLES BEING HELD  1 RD, 1 BLUE     COMMENT        Add-on orders for these samples will be processed based on acceptable specimen integrity and analyte stability, which may vary by analyte.    MAGNESIUM    Collection Time: 04/03/21  7:47 AM   Result Value Ref Range    Magnesium 2.1 1.6 - 2.4 mg/dL   TSH 3RD GENERATION    Collection Time: 04/03/21  7:47 AM   Result Value Ref Range    TSH 1.36 0.36 - 3.74 uIU/mL   HCG QL SERUM    Collection Time: 04/03/21  7:47 AM   Result Value Ref Range    HCG, Ql. Negative NEG     TYPE & SCREEN    Collection Time: 04/03/21  9:03 AM   Result Value Ref Range    Crossmatch Expiration 04/06/2021,2359     ABO/Rh(D) A POSITIVE     Antibody screen NEG    RBC, ALLOCATE    Collection Time: 04/03/21 10:00 AM   Result Value Ref Range    HISTORY CHECKED? Historical check performed        Radiologic Studies -   XR CHEST PORT   Final Result   No acute cardiopulmonary process. CT Results  (Last 48 hours)    None        CXR Results  (Last 48 hours)               04/03/21 0808  XR CHEST PORT Final result    Impression:  No acute cardiopulmonary process. Narrative:  EXAM: XR CHEST PORT       HISTORY: Near syncope, new heart murmur, eval for cardiomegaly. .       COMPARISON: None       FINDINGS: Single view(s) of the chest. The lungs are well inflated. No focal   consolidation, pleural effusion, or pneumothorax. The cardiomediastinal   silhouette is unremarkable. The visualized osseous structures are unremarkable. Medical Decision Making   I am the first provider for this patient. I reviewed the vital signs, available nursing notes, past medical history, past surgical history, family history and social history. Vital Signs-Reviewed the patient's vital signs.   Patient Vitals for the past 12 hrs:   Temp Pulse Resp BP SpO2   04/03/21 0845 -- 88 20 (!) 115/52 100 %   04/03/21 0830 -- 88 21 112/60 100 %   04/03/21 0815 -- 97 20 (!) 97/55 100 %   04/03/21 0800 -- 88 19 (!) 112/52 100 %   04/03/21 0737 -- (!) 111 19 115/60 100 %   04/03/21 0736 -- (!) 114 20 123/76 100 %   04/03/21 0734 -- 89 16 (!) 112/53 100 %   04/03/21 0713 97.7 °F (36.5 °C) (!) 108 17 126/60 100 %       EKG interpretation: (Preliminary)  EKG performed at 7:18 AM, as interpreted by me shows normal sinus rhythm at a rate of 90, normal axis, normal intervals without visible acute ischemic changes. Records Reviewed: Nursing Notes, Old Medical Records, Previous Radiology Studies and Previous Laboratory Studies    Provider Notes (Medical Decision Making):   38-year-old female here with acute vision change that quickly returned on the setting of standing up quickly from the toilet without full syncope and no lingering neurological symptoms. Patient had swing of heart rate up to 21 bpm on orthostatic testing, slight drop in blood pressure, will provide IV fluids as could be at root of patient's symptoms this morning, check electrolytes including TSH. Patient was unaware she had a heart murmur and have told her to follow-up with primary care for echocardiogram but do not feel today's murmur which is a 2 out of 6 systolic ejection murmur is at the root of today's symptoms. ED Course:   Initial assessment performed. The patients presenting problems have been discussed, and they are in agreement with the care plan formulated and outlined with them. I have encouraged them to ask questions as they arise throughout their visit. Critical Care Time:   CRITICAL CARE NOTE :    7:17 AM    IMPENDING DETERIORATION -Cardiovascular  ASSOCIATED RISK FACTORS - Shock and Bleeding  MANAGEMENT- Bedside Assessment and Supervision of Care  INTERPRETATION -  Blood Pressure and Cardiac Output Measures   INTERVENTIONS - hemodynamic mngmt and blood transfusion  CASE REVIEW - Hospitalist/Intensivist, Nursing and Family  TREATMENT RESPONSE -Stable  PERFORMED BY - Self    NOTES   :  I have spent 45 minutes of critical care time involved in lab review, consultations with specialist, family decision- making, bedside attention and documentation. This time excludes time spent in any separate billed procedures. During this entire length of time I was immediately available to the patient .     Nikhil Lion, MD      Disposition:  Admit    Admit Note:  11:05 AM  Pt is being admitted by Dr. Isa Schilling. The results of their tests and reason(s) for their admission have been discussed with pt and/or available family. They convey agreement and understanding for the need to be admitted and for admission diagnosis. Diagnosis     Clinical Impression:   1. Symptomatic anemia    2. Near syncope        Attestations:    Yesica Hector MD    Please note that this dictation was completed with [employer], the computer voice recognition software. Quite often unanticipated grammatical, syntax, homophones, and other interpretive errors are inadvertently transcribed by the computer software. Please disregard these errors. Please excuse any errors that have escaped final proofreading. Thank you.

## 2021-04-03 NOTE — ED NOTES
Patient is being transferred to [facility], Room # 1140. Report given to Kristine Le RN on Rusty Anton for routine progression of care. Report consisted of the following information SBAR, Kardex and ED Summary. Patient transferred to receiving unit by: Clary Fontanez  (RN or tech name). Outstanding consults needed: No     Next labs due: No     The following personal items will be sent with the patient during transfer to the floor: All valuables:    Cardiac monitoring ordered: No     The following CURRENT information was reported to the receiving RN:    Code status: Full Code at time of transfer    Last set of vital signs:  Vital Signs  Level of Consciousness: Alert (04/03/21 1230)  Temp: 98.2 °F (36.8 °C) (04/03/21 1300)  Temp Source: Oral (04/03/21 1300)  Pulse (Heart Rate): (!) 110 (04/03/21 1315)  Resp Rate: 22 (04/03/21 1315)  BP: (!) 112/51 (04/03/21 1315)  MAP (Monitor): 66 (04/03/21 1315)  MAP (Calculated): 71 (04/03/21 1315)  BP 1 Location: Left arm (04/03/21 0713)  BP 1 Method: Automatic (04/03/21 0713)  BP Patient Position: At rest (04/03/21 1300)  MEWS Score: 1 (04/03/21 1230)         Oxygen Therapy  O2 Sat (%): 100 % (04/03/21 1315)  Pulse via Oximetry: 111 beats per minute (04/03/21 1315)  O2 Device: Room air (04/03/21 0713)      Last pain assessment:  Pain 1  Pain Scale 1: Numeric (0 - 10)  Pain Intensity 1: 0      Wounds: No     Urinary catheter: voiding  Is there a valle order: No     LDAs:       Peripheral IV 04/03/21 Right Antecubital (Active)         Opportunity for questions and clarification was provided.     Sugar Jacobsen RN

## 2021-04-03 NOTE — PROGRESS NOTES
Pharmacy Clarification of Prior to Admission Medication Regimen     The patient was interviewed regarding clarification of the prior to admission medication regimen. The patient's  was present in room, and permission was obtained from patient to discuss drug regimen with visitor(s) present. The individual(s) listed above were questioned regarding the patient's use of any other inhalers, topical products, over the counter medications, herbal medications, vitamin products or ophthalmic/nasal/otic medication use. Information Obtained From: Patient    Pertinent Pharmacy Findings:      PTA medication list was corrected to the following:     Prior to Admission Medications   Prescriptions Last Dose Informant Taking?   multivitamin (ONE A DAY) tablet 4/2/2021 at Unknown time Self Yes   Sig: Take 1 Tab by mouth daily. tretinoin (RETIN-A) 0.1 % topical cream 3/27/2021 at Unknown time Self Yes   Sig: Apply  to affected area two (2) times daily as needed.       Facility-Administered Medications: None        Thank you,  [id number] [facility] Intern

## 2021-04-03 NOTE — ED NOTES
26 Pt arrived via private vehicle for c/o episode of blacked out vision after voiding. Pt state she has had double vision and blurred vision pt had her vision go black and then slowly came back as she laid down. 0730 pt has orthostatic hypotension when changing positions. MD notified and fluids ordered. 3581 pt ambulated to bathroom with out difficultly. 1045 hospitalist at bedside     Neurology at the bedside.     1246 Provided Meal tray

## 2021-04-04 LAB
ANION GAP SERPL CALC-SCNC: 5 MMOL/L (ref 5–15)
APPEARANCE CSF: CLEAR
APPEARANCE CSF: CLEAR
BASOPHILS # BLD: 0.1 K/UL (ref 0–0.1)
BASOPHILS NFR BLD: 2 % (ref 0–1)
BUN SERPL-MCNC: 11 MG/DL (ref 6–20)
BUN/CREAT SERPL: 15 (ref 12–20)
CALCIUM SERPL-MCNC: 8.4 MG/DL (ref 8.5–10.1)
CHLORIDE SERPL-SCNC: 109 MMOL/L (ref 97–108)
CO2 SERPL-SCNC: 25 MMOL/L (ref 21–32)
COLOR CSF: COLORLESS
COLOR CSF: COLORLESS
CREAT SERPL-MCNC: 0.74 MG/DL (ref 0.55–1.02)
DIFFERENTIAL METHOD BLD: ABNORMAL
EOSINOPHIL # BLD: 0.1 K/UL (ref 0–0.4)
EOSINOPHIL NFR BLD: 2 % (ref 0–7)
ERYTHROCYTE [DISTWIDTH] IN BLOOD BY AUTOMATED COUNT: 23.5 % (ref 11.5–14.5)
ERYTHROCYTE [DISTWIDTH] IN BLOOD BY AUTOMATED COUNT: 32 % (ref 11.5–14.5)
GLUCOSE CSF-MCNC: 57 MG/DL (ref 40–70)
GLUCOSE SERPL-MCNC: 91 MG/DL (ref 65–100)
HCT VFR BLD AUTO: 21.6 % (ref 35–47)
HCT VFR BLD AUTO: 29.5 % (ref 35–47)
HEMOCCULT STL QL: NEGATIVE
HGB BLD-MCNC: 5.2 G/DL (ref 11.5–16)
HGB BLD-MCNC: 7.9 G/DL (ref 11.5–16)
IMM GRANULOCYTES # BLD AUTO: 0 K/UL (ref 0–0.04)
IMM GRANULOCYTES NFR BLD AUTO: 1 % (ref 0–0.5)
LYMPHOCYTES # BLD: 1.3 K/UL (ref 0.8–3.5)
LYMPHOCYTES NFR BLD: 36 % (ref 12–49)
MCH RBC QN AUTO: 12.7 PG (ref 26–34)
MCH RBC QN AUTO: 16.6 PG (ref 26–34)
MCHC RBC AUTO-ENTMCNC: 24.1 G/DL (ref 30–36.5)
MCHC RBC AUTO-ENTMCNC: 26.8 G/DL (ref 30–36.5)
MCV RBC AUTO: 52.7 FL (ref 80–99)
MCV RBC AUTO: 62 FL (ref 80–99)
MONOCYTES # BLD: 0.3 K/UL (ref 0–1)
MONOCYTES NFR BLD: 8 % (ref 5–13)
NEUTS SEG # BLD: 1.8 K/UL (ref 1.8–8)
NEUTS SEG NFR BLD: 51 % (ref 32–75)
NRBC # BLD: 0 K/UL (ref 0–0.01)
NRBC # BLD: 0 K/UL (ref 0–0.01)
NRBC BLD-RTO: 0 PER 100 WBC
NRBC BLD-RTO: 0 PER 100 WBC
PATH REV BLD -IMP: ABNORMAL
PLATELET # BLD AUTO: 294 K/UL (ref 150–400)
PLATELET # BLD AUTO: 335 K/UL (ref 150–400)
PMV BLD AUTO: ABNORMAL FL (ref 8.9–12.9)
PMV BLD AUTO: ABNORMAL FL (ref 8.9–12.9)
POTASSIUM SERPL-SCNC: 4 MMOL/L (ref 3.5–5.1)
PROT CSF-MCNC: 24 MG/DL (ref 15–45)
RBC # BLD AUTO: 4.1 M/UL (ref 3.8–5.2)
RBC # BLD AUTO: 4.76 M/UL (ref 3.8–5.2)
RBC # CSF: 2 /CU MM
RBC # CSF: 3 /CU MM
RBC MORPH BLD: ABNORMAL
SODIUM SERPL-SCNC: 139 MMOL/L (ref 136–145)
TUBE # CSF: 1
TUBE # CSF: 3
WBC # BLD AUTO: 3.6 K/UL (ref 3.6–11)
WBC # BLD AUTO: 5.5 K/UL (ref 3.6–11)
WBC # CSF: 0 /CU MM (ref 0–5)
WBC # CSF: 0 /CU MM (ref 0–5)

## 2021-04-04 PROCEDURE — 85027 COMPLETE CBC AUTOMATED: CPT

## 2021-04-04 PROCEDURE — 86255 FLUORESCENT ANTIBODY SCREEN: CPT

## 2021-04-04 PROCEDURE — 84157 ASSAY OF PROTEIN OTHER: CPT

## 2021-04-04 PROCEDURE — 83916 OLIGOCLONAL BANDS: CPT

## 2021-04-04 PROCEDURE — 94760 N-INVAS EAR/PLS OXIMETRY 1: CPT

## 2021-04-04 PROCEDURE — 99215 OFFICE O/P EST HI 40 MIN: CPT | Performed by: PSYCHIATRY & NEUROLOGY

## 2021-04-04 PROCEDURE — 82272 OCCULT BLD FECES 1-3 TESTS: CPT

## 2021-04-04 PROCEDURE — 74011250636 HC RX REV CODE- 250/636: Performed by: NURSE PRACTITIONER

## 2021-04-04 PROCEDURE — 89050 BODY FLUID CELL COUNT: CPT

## 2021-04-04 PROCEDURE — 82945 GLUCOSE OTHER FLUID: CPT

## 2021-04-04 PROCEDURE — 87070 CULTURE OTHR SPECIMN AEROBIC: CPT

## 2021-04-04 PROCEDURE — 99218 HC RM OBSERVATION: CPT

## 2021-04-04 PROCEDURE — 80048 BASIC METABOLIC PNL TOTAL CA: CPT

## 2021-04-04 PROCEDURE — 74011250637 HC RX REV CODE- 250/637: Performed by: PSYCHIATRY & NEUROLOGY

## 2021-04-04 PROCEDURE — 74011000258 HC RX REV CODE- 258: Performed by: NURSE PRACTITIONER

## 2021-04-04 PROCEDURE — 96374 THER/PROPH/DIAG INJ IV PUSH: CPT

## 2021-04-04 PROCEDURE — 62270 DX LMBR SPI PNXR: CPT | Performed by: PSYCHIATRY & NEUROLOGY

## 2021-04-04 PROCEDURE — 87015 SPECIMEN INFECT AGNT CONCNTJ: CPT

## 2021-04-04 PROCEDURE — 62270 DX LMBR SPI PNXR: CPT

## 2021-04-04 PROCEDURE — 36415 COLL VENOUS BLD VENIPUNCTURE: CPT

## 2021-04-04 PROCEDURE — 74011250637 HC RX REV CODE- 250/637: Performed by: HOSPITALIST

## 2021-04-04 RX ORDER — ACETAZOLAMIDE 500 MG/1
500 CAPSULE, EXTENDED RELEASE ORAL EVERY 12 HOURS
Status: DISCONTINUED | OUTPATIENT
Start: 2021-04-04 | End: 2021-04-05 | Stop reason: HOSPADM

## 2021-04-04 RX ADMIN — IRON SUCROSE 200 MG: 20 INJECTION, SOLUTION INTRAVENOUS at 15:31

## 2021-04-04 RX ADMIN — Medication 10 ML: at 22:41

## 2021-04-04 RX ADMIN — THERA TABS 1 TABLET: TAB at 15:31

## 2021-04-04 RX ADMIN — ACETAZOLAMIDE 500 MG: 500 CAPSULE, EXTENDED RELEASE ORAL at 15:31

## 2021-04-04 RX ADMIN — Medication 10 ML: at 05:34

## 2021-04-04 NOTE — PROGRESS NOTES
Observation notice provided in writing to patient and/or caregiver as well as verbal explanation of the policy. Patients who are in outpatient status also receive the Observation notice.       Concha Campbell 169, R Pat De Jesus 75

## 2021-04-04 NOTE — PROCEDURES
Procedure Note: Lumbar Puncture    Procedure, rationale, methods, medications and materials were explained to the patient in detail. Adverse outcomes and alternatives were explained. Written and signed consent was obtained from the patient. Patient was placed in right lateral decubitus. Insertion site was identified using superior iliac crest. At the midpoint on the intracristal line, the skin was cleansed with betadine solution, which was allowed to dry. Using a 25 gauge 1.5\" needle from a sterile lumbar puncture tray, 1% lidocaine solution was used to infiltrate the afore identified site. A 20 gauge 3 inch spinal needle from the sterile lumbar tray was inserted under antiseptic conditions at the predefined insertion site. The stylus was removed and prompt CSF flowed. The CSF pressure was measured using a sterile manometer. The CSF was collected in 4 sterile vials which were sealed and labeled. The sterile stylus was reinserted and the spinal needle was removed. The patient tolerated the procedure well.       Volume of lidocaine used  2 ml   Number of passes  1   Opening pressure  31 Closing pressure 18   Volume of CSF collected  12   Color  Colorless   Aspect  Clear   Volume of blood loss  <1 ml

## 2021-04-04 NOTE — CONSULTS
NEUROLOGY CONSULT    Name Channing Tyson Age 38 y.o. MRN [id number]  1985     Referring Physician: Tye Pastor NP      Chief Complaint:  Vision problems     This is 17-year-old female with a medical history of depression. The patient was admitted for double vision, lightheadedness and blurred vision. The symptoms initially presented on coming off Effexor. Initial work-up which included an MRI increased intracranial pressure. Significantly anemic and received 2 units of packed red blood cells. She feels more energetic but continues to have her visual symptoms. We discussed performing a lumbar puncture to which the patient agreed. Assessment and Plan  1. Pseudotumors Cerebrii  MRI brain  Partially empty sella with mild enlargement of the optic nerve sheaths, but  without flattening of the posterior sclera. This may represent early signs of  idiopathic intracranial hypertension in the appropriate clinical context. Lumbar puncture opening pressure 330 mm/H2O    2. Double vision  May be a \"false localizing CNVI\" palsy secondary to pressure on the CNVI    3. Mennorhagia  On Venofer    4.  Anemia  Secondary to menorrhagia  Status post blood transfusion       No Known Allergies        Current Facility-Administered Medications   Medication Dose Route Frequency    iron sucrose (VENOFER) 200 mg in 0.9% sodium chloride 100 mL IVPB  200 mg IntraVENous Q24H    acetaZOLAMIDE SR (DIAMOX) capsule 500 mg  500 mg Oral Q12H    0.9% sodium chloride infusion 250 mL  250 mL IntraVENous PRN    0.9% sodium chloride infusion 250 mL  250 mL IntraVENous PRN    sodium chloride (NS) flush 5-40 mL  5-40 mL IntraVENous Q8H    sodium chloride (NS) flush 5-40 mL  5-40 mL IntraVENous PRN    acetaminophen (TYLENOL) tablet 650 mg  650 mg Oral Q6H PRN    Or    acetaminophen (TYLENOL) suppository 650 mg  650 mg Rectal Q6H PRN    polyethylene glycol (MIRALAX) packet 17 g  17 g Oral DAILY PRN    promethazine (PHENERGAN) tablet 12.5 mg  12.5 mg Oral Q6H PRN    Or    ondansetron (ZOFRAN) injection 4 mg  4 mg IntraVENous Q6H PRN    therapeutic multivitamin (THERAGRAN) tablet 1 Tab  1 Tab Oral DAILY       Past Medical History:   Diagnosis Date    Anemia 2019    iron deficiency anemia; stopped iron 1 year    COVID-19 virus infection 12/2020    Depression     Hemorrhoids         Social History     Tobacco Use    Smoking status: Never Smoker    Smokeless tobacco: Never Used   Substance Use Topics    Alcohol use: Yes     Frequency: 2-4 times a month     Comment: few shots a week    Drug use: No        Review of Systems   Constitutional: Negative for chills and fever. HENT: Negative for ear pain. Eyes: Positive for blurred vision, double vision and pain. Negative for discharge. Respiratory: Negative for cough and hemoptysis. Cardiovascular: Negative for chest pain and claudication. Gastrointestinal: Negative for constipation and diarrhea. Genitourinary: Negative for flank pain and hematuria. Musculoskeletal: Negative for back pain and myalgias. Skin: Negative for itching and rash. Neurological: Positive for dizziness and headaches. Endo/Heme/Allergies: Negative for environmental allergies. Does not bruise/bleed easily. Psychiatric/Behavioral: Negative for depression and hallucinations. Exam  Visit Vitals  BP (!) 117/53 (BP 1 Location: Left upper arm, BP Patient Position: At rest)   Pulse 85   Temp 98.5 °F (36.9 °C)   Resp 18   Ht 5' 8\" (1.727 m)   Wt 181 lb 7 oz (82.3 kg)   LMP 03/18/2021   SpO2 100%   BMI 27.59 kg/m²         General: Well developed, well nourished. Patient in no distress   Head: Normocephalic, atraumatic, anicteric sclera   Neck Normal ROM, No thyromegally   Lungs:  Clear to auscultation    Cardiac: Regular rate and rhythm with no murmurs.    Abd: Bowel sounds were audible   Ext: No pedal edema   Skin: Supple no rash     NeurologicExam:  Mental Status: Alert and oriented to person place and time   Speech: Fluent no aphasia or dysarthria. Cranial Nerves:   II-XII Intact with the exception of  Horizontal diplopia   Motor:  Full and symmetric strength of upper and lower ext . Normal bulk and tone. Reflexes:   Deep tendon reflexes 2+/4 and symmetric. Sensory:   Symmetric and intact    Gait:  Gait is balanced    Tremor:   No tremor noted. Cerebellar:  Coordination intact. Neurovascular: No carotid bruits.  No JVD      Lab Review  Lab Results   Component Value Date/Time    WBC 5.5 04/04/2021 05:29 AM    HCT 29.5 (L) 04/04/2021 05:29 AM    HGB 7.9 (L) 04/04/2021 05:29 AM    PLATELET 110 81/14/8978 05:29 AM     Lab Results   Component Value Date/Time    Sodium 139 04/04/2021 05:29 AM    Potassium 4.0 04/04/2021 05:29 AM    Chloride 109 (H) 04/04/2021 05:29 AM    CO2 25 04/04/2021 05:29 AM    Glucose 91 04/04/2021 05:29 AM    BUN 11 04/04/2021 05:29 AM    Creatinine 0.74 04/04/2021 05:29 AM    Calcium 8.4 (L) 04/04/2021 05:29 AM       Lab Results   Component Value Date/Time    LDL, calculated 84 02/16/2018 03:45 PM     Lab Results   Component Value Date/Time    Hemoglobin A1c 5.7 (H) 03/24/2017 12:00 AM    Hemoglobin A1c (POC) 4.9 11/27/2018 08:19 AM

## 2021-04-04 NOTE — PROGRESS NOTES
Hospitalist Progress Note    NAME: Toni Rea   :  1985   MRN:  [id number]     I reviewed pertinent labs and imaging, and discussed /agreed on the plan of care with Dr. Riddhi Walsh. Assessment / Plan:  Severe Microcytic Anemia  Near Syncope  Hx of Iron deficiency anemia diagnosed in 2019    HgB 5.2 on admission   S/P 2 units of PRBC's   Current HgB 7.9   Transfuse for hgB less than 7.0    Reticulocyte count 14.4    Vitamin B12 991    Iron 7, TIBC 472, Iron % saturation 1, Ferritin 1    Venofer 200 mg x 3 doses    States her cycles are heavy the first 2 days     Last cycle 3/18/21  Idiopathic Intracranial Hypertension  Diplopia  Chronic Headache   Blurred and double vision for about 1 year   Followed by Dr. Courtney Weeks   S/P LP this am   Mild headache  MRI brain w/o contrast 3/1/2021: IMPRESSION  1. Partially empty sella with mild enlargement of the optic nerve sheaths, but  without flattening of the posterior sclera. This may represent early signs of  idiopathic intracranial hypertension in the appropriate clinical context. 2. Otherwise normal appearance of the orbits and normal brain MRI. Appreciate Neurology input  Uterine Fibroid POA   Transvaginal pelvic sonography performed. 4/3/2021:  The uterus measured 10.9 x 6.9 x 4.9 cm. The stripe is 1.2 cm. There is a 4.3 x  3.8 x 4 cm fibroid in the fundus. To the right of midline. Small nabothian cysts  are seen in the cervix. The right ovary measured 3.8 x 3 x 2.2 cm with normal flow. The left ovary  measured 2.7 x 2 x 1.7 cm with normal flow. IMPRESSION   impression: Uterine fibroid  Follow up with GYN  Hx of tubaligatiion      25.0 - 29.9 Overweight / Body mass index is 27.59 kg/m². Code status: Full  Prophylaxis: SCD's  Recommended Disposition: Home w/Family     Subjective:     Chief Complaint / Reason for Physician Visit  Patient s/p LP, states has some mild discomfort at the site. Lying flat for an hour per Neurology recommendation. .  Discussed with RN events overnight. Review of Systems:  Symptom Y/N Comments  Symptom Y/N Comments   Fever/Chills n   Chest Pain n    Poor Appetite n   Edema n    Cough n   Abdominal Pain n    Sputum n   Joint Pain     SOB/BRIONES n   Pruritis/Rash n    Nausea/vomit n   Tolerating PT/OT     Diarrhea n   Tolerating Diet y    Constipation n   Other       Could NOT obtain due to:      Objective:     VITALS:   Last 24hrs VS reviewed since prior progress note. Most recent are:  Patient Vitals for the past 24 hrs:   Temp Pulse Resp BP SpO2   04/04/21 1524 98.8 °F (37.1 °C) 75 18 (!) 110/53 100 %   04/04/21 1153 98.5 °F (36.9 °C) 85 18 (!) 117/53 100 %   04/04/21 0803 -- 71 -- (!) 111/50 --   04/04/21 0724 98.7 °F (37.1 °C) 86 18 (!) 116/56 100 %   04/04/21 0411 98.4 °F (36.9 °C) 92 18 (!) 111/51 100 %   04/03/21 2327 98 °F (36.7 °C) 99 18 (!) 105/52 100 %   04/03/21 2022 98.2 °F (36.8 °C) 92 18 (!) 108/50 100 %   04/03/21 1832 98 °F (36.7 °C) 98 18 (!) 115/56 100 %   04/03/21 1730 98 °F (36.7 °C) 96 18 (!) 112/56 99 %   04/03/21 1631 98.2 °F (36.8 °C) 82 18 (!) 112/49 100 %   04/03/21 1556 -- 93 18 (!) 112/56 100 %     No intake or output data in the 24 hours ending 04/04/21 1549     I had a face to face encounter and independently examined this patient on 4/4/2021, as outlined below:  PHYSICAL EXAM:  General: Alert, cooperative, no acute distress    EENT:  Anicteric sclerae. Normocephalic  Resp:  CTA bilaterally, no wheezing or rales. No accessory muscle use  CV:  Regular  rhythm,  No edema  GI:  Soft, Non distended, Non tender. +Bowel sounds  Neurologic:  Alert and oriented X 3, normal speech,   Psych:   Good insight. Not anxious nor agitated  Skin:  No rashes.   No jaundice    Reviewed most current lab test results and cultures  YES  Reviewed most current radiology test results   YES  Review and summation of old records today    NO  Reviewed patient's current orders and MAR    YES  PMH/SH reviewed - no change compared to H&P  ________________________________________________________________________  Care Plan discussed with:    Comments   Patient y    Family      RN y    Care Manager     Consultant                        Multidiciplinary team rounds were held today with , nursing, pharmacist and clinical coordinator. Patient's plan of care was discussed; medications were reviewed and discharge planning was addressed. ________________________________________________________________________    ________________________________________________________________________  Cori Navarro NP     Procedures: see electronic medical records for all procedures/Xrays and details which were not copied into this note but were reviewed prior to creation of Plan. LABS:  I reviewed today's most current labs and imaging studies.   Pertinent labs include:  Recent Labs     04/04/21  0529 04/03/21 2159 04/03/21  0747   WBC 5.5  --  3.6   HGB 7.9* 7.6* 5.2*   HCT 29.5* 28.1* 21.6*     --  335     Recent Labs     04/04/21  0529 04/03/21  0747    138   K 4.0 3.7   * 107   CO2 25 28   GLU 91 88   BUN 11 11   CREA 0.74 0.71   CA 8.4* 8.3*   MG  --  2.1   ALB  --  3.7   TBILI  --  0.3   ALT  --  33       Signed: Cori Navarro NP

## 2021-04-04 NOTE — PROGRESS NOTES
Problem: Falls - Risk of  Goal: *Absence of Falls  Description: Document Yann Broad Fall Risk and appropriate interventions in the flowsheet.   Outcome: Progressing Towards Goal  Note: Fall Risk Interventions:            Medication Interventions: Patient to call before getting OOB, Teach patient to arise slowly         History of Falls Interventions: Door open when patient unattended, Room close to nurse's station         Problem: Anemia Care Plan (Adult and Pediatric)  Goal: *Labs within defined limits  Outcome: Progressing Towards Goal  Goal: *Tolerates increased activity  Outcome: Progressing Towards Goal

## 2021-04-04 NOTE — PROGRESS NOTES
Hospitalist Progress Note    NAME: Yelena Mcleod   :  1985   MRN:  [id number]     I reviewed pertinent labs and imaging, and discussed /agreed on the plan of care with Dr. Garrick Smith. Assessment / Plan:  Severe Microcytic Anemia  Near Syncope  Hx of Iron deficiency anemia diagnosed in 2019    HgB 5.2 on admission   S/P 2 units of PRBC's   Current HgB 7.9   Transfuse for hgB less than 7.0    Reticulocyte count 14.4    Vitamin B12 991    Iron 7, TIBC 472, Iron % saturation 1, Ferritin 1    Venofer 200 mg x 3 doses    States her cycles are heavy the first 2 days     Last cycle 3/18/21  Idiopathic Intracranial Hypertension  Diplopia  Chronic Headache   Blurred and double vision for about 1 year   Followed by Dr. Kendall Henry   S/P LP this am   Mild headache  MRI brain w/o contrast 3/1/2021: IMPRESSION  1. Partially empty sella with mild enlargement of the optic nerve sheaths, but  without flattening of the posterior sclera. This may represent early signs of  idiopathic intracranial hypertension in the appropriate clinical context. 2. Otherwise normal appearance of the orbits and normal brain MRI. Appreciate Neurology input  Uterine Fibroid POA   Transvaginal pelvic sonography performed. 4/3/2021:  The uterus measured 10.9 x 6.9 x 4.9 cm. The stripe is 1.2 cm. There is a 4.3 x  3.8 x 4 cm fibroid in the fundus. To the right of midline. Small nabothian cysts  are seen in the cervix. The right ovary measured 3.8 x 3 x 2.2 cm with normal flow. The left ovary  measured 2.7 x 2 x 1.7 cm with normal flow. IMPRESSION   impression: Uterine fibroid  Follow up with GYN  Hx of tubaligatiion      25.0 - 29.9 Overweight / Body mass index is 27.59 kg/m². Code status: Full  Prophylaxis: SCD's  Recommended Disposition: Home w/Family     Subjective:     Chief Complaint / Reason for Physician Visit  Patient s/p LP, states has some mild discomfort at the site. Lying flat for an hour per Neurology recommendation. .  Discussed with RN events overnight. Review of Systems:  Symptom Y/N Comments  Symptom Y/N Comments   Fever/Chills n   Chest Pain n    Poor Appetite n   Edema n    Cough n   Abdominal Pain n    Sputum n   Joint Pain     SOB/BRIONES n   Pruritis/Rash n    Nausea/vomit n   Tolerating PT/OT     Diarrhea n   Tolerating Diet y    Constipation n   Other       Could NOT obtain due to:      Objective:     VITALS:   Last 24hrs VS reviewed since prior progress note. Most recent are:  Patient Vitals for the past 24 hrs:   Temp Pulse Resp BP SpO2   04/04/21 0803 -- 71 -- (!) 111/50 --   04/04/21 0724 98.7 °F (37.1 °C) 86 18 (!) 116/56 100 %   04/04/21 0411 98.4 °F (36.9 °C) 92 18 (!) 111/51 100 %   04/03/21 2327 98 °F (36.7 °C) 99 18 (!) 105/52 100 %   04/03/21 2022 98.2 °F (36.8 °C) 92 18 (!) 108/50 100 %   04/03/21 1832 98 °F (36.7 °C) 98 18 (!) 115/56 100 %   04/03/21 1730 98 °F (36.7 °C) 96 18 (!) 112/56 99 %   04/03/21 1631 98.2 °F (36.8 °C) 82 18 (!) 112/49 100 %   04/03/21 1556 -- 93 18 (!) 112/56 100 %   04/03/21 1533 97.9 °F (36.6 °C) 90 18 (!) 118/52 100 %   04/03/21 1445 98.2 °F (36.8 °C) 97 18 (!) 110/49 100 %   04/03/21 1348 98.1 °F (36.7 °C) (!) 106 18 (!) 129/58 100 %   04/03/21 1315 -- (!) 110 22 (!) 112/51 100 %   04/03/21 1300 98.2 °F (36.8 °C) (!) 105 18 96/65 100 %   04/03/21 1245 -- 84 21 (!) 110/49 100 %   04/03/21 1230 98 °F (36.7 °C) 82 19 (!) 117/58 100 %   04/03/21 1220 -- 88 19 108/61 (!) 81 %   04/03/21 1215 -- 88 19 (!) 117/57 100 %   04/03/21 1200 -- (!) 104 18 120/64 100 %       Intake/Output Summary (Last 24 hours) at 4/4/2021 1154  Last data filed at 4/3/2021 1517  Gross per 24 hour   Intake 433.3 ml   Output --   Net 433.3 ml        I had a face to face encounter and independently examined this patient on 4/4/2021, as outlined below:  PHYSICAL EXAM:  General: Alert, cooperative, no acute distress    EENT:  Anicteric sclerae. Normocephalic  Resp:  CTA bilaterally, no wheezing or rales.   No accessory muscle use  CV:  Regular  rhythm,  No edema  GI:  Soft, Non distended, Non tender. +Bowel sounds  Neurologic:  Alert and oriented X 3, normal speech,   Psych:   Good insight. Not anxious nor agitated  Skin:  No rashes. No jaundice    Reviewed most current lab test results and cultures  YES  Reviewed most current radiology test results   YES  Review and summation of old records today    NO  Reviewed patient's current orders and MAR    YES  PMH/SH reviewed - no change compared to H&P  ________________________________________________________________________  Care Plan discussed with:    Comments   Patient y    Family      RN y    Care Manager     Consultant                        Multidiciplinary team rounds were held today with , nursing, pharmacist and clinical coordinator. Patient's plan of care was discussed; medications were reviewed and discharge planning was addressed. ________________________________________________________________________    ________________________________________________________________________  Rhiannon Endo, NP     Procedures: see electronic medical records for all procedures/Xrays and details which were not copied into this note but were reviewed prior to creation of Plan. LABS:  I reviewed today's most current labs and imaging studies.   Pertinent labs include:  Recent Labs     04/04/21 0529 04/03/21 2159 04/03/21  0747   WBC 5.5  --  3.6   HGB 7.9* 7.6* 5.2*   HCT 29.5* 28.1* 21.6*     --  335     Recent Labs     04/04/21  0529 04/03/21  0747    138   K 4.0 3.7   * 107   CO2 25 28   GLU 91 88   BUN 11 11   CREA 0.74 0.71   CA 8.4* 8.3*   MG  --  2.1   ALB  --  3.7   TBILI  --  0.3   ALT  --  33       Signed: Rhiannon Napier, NP

## 2021-04-04 NOTE — PROGRESS NOTES
Reason for Admission: Transient loss of vision                    RUR Score: N/A                    Plan for utilizing home health: Unknown at this time but it is unlikely the patient will require [place] services as she is completely independent in her ADLs and IADLs         PCP: First and Last name:  Twyla Abbott NP     Name of Practice:    Are you a current patient: Yes/No: Yes   Approximate date of last visit: September 2020   Can you participate in a virtual visit with your PCP: Yes                    Current Advanced Directive/Advance Care Plan: Full Code      Healthcare Decision Maker: Charissa Camara, Mother, Phone: [phone]                 Transition of Care Plan:    CM met with the patient at bedside to discuss dispo plan. The patient resides in a house with her 17 y/o son, 15 y/o daughter, and 6 y/o daughter. Her sister lives down the street from her and her mother also lives close to her. She reports that he family is very supportive. She is completely independent in her ADLs and IADLs. She uses no assistive device when ambulating. She is able to drive and her significant other will assist with d/c transportation. The patient is employed full-time at [employer] [address] as a . She uses the [facility] [address] for her medications. She has never been to a SNF/IPR in the past and [facility]. The patient likely will have no needs from CM upon d/c. CM will continue to assist with dispo as needed. Care Management Interventions  PCP Verified by CM: Yes(The patient saw her PCP in September of 2020)  Mode of Transport at Discharge: Other (see comment)(The patient's significant other will transport her home upon d/ )  Transition of Care Consult (CM Consult): Discharge Planning  MyChart Signup: No  Discharge Durable Medical Equipment: No  Physical Therapy Consult: No  Occupational Therapy Consult: No  Speech Therapy Consult: No  Current Support Network:  Other(The patient's 3 children live with her )  Confirm Follow Up Transport: Self  The Patient and/or Patient Representative was Provided with a Choice of Provider and Agrees with the Discharge Plan?: Yes  Name of the Patient Representative Who was Provided with a Choice of Provider and Agrees with the Discharge Plan: [facility] Information Provided?: No  Discharge Location  Discharge Placement: Home     [place], [place]

## 2021-04-04 NOTE — PROGRESS NOTES
End of Shift Note    Bedside shift change report given to Erich (oncoming nurse) by Anthony Narvaez (offgoing nurse). Report included the following information SBAR, Kardex, Intake/Output and MAR    Shift worked:  7p-7a     Shift summary and any significant changes:     Pt stable through shift. No complaints through night. Labs drawn. Hgb now 7.9. Neuro plans to down LP today. Pt in good spirits. Concerns for physician to address:  Hgb better. LP today with Neuro. [facility]   [id number]       Activity:  Activity Level: Up ad damian  Number times ambulated in hallways past shift: 0  Number of times OOB to chair past shift: 1    Cardiac:   Cardiac Monitoring: Yes      Cardiac Rhythm: Normal sinus rhythm    Access:   Current line(s): PIV     Genitourinary:   Urinary status: voiding    Respiratory:   O2 Device: Room air  Chronic home O2 use?: NO  Incentive spirometer at bedside: NO     GI:  Last Bowel Movement Date: 04/03/21(PTA, pt stated)  Current diet:  DIET REGULAR  Passing flatus: YES  Tolerating current diet: YES       Pain Management:   Patient states pain is manageable on current regimen: YES    Skin:  Rafael Score: 22  Interventions: increase time out of bed    Patient Safety:  Fall Score:  Total Score: 1  Interventions: gripper socks, pt to call before getting OOB and stay with me (per policy)       Length of Stay:  Expected LOS: - - -  Actual LOS: 0      Anthony Narvaez

## 2021-04-05 ENCOUNTER — TELEPHONE (OUTPATIENT)
Dept: NEUROLOGY | Age: 36
End: 2021-04-05

## 2021-04-05 VITALS
OXYGEN SATURATION: 98 % | TEMPERATURE: 98.9 F | RESPIRATION RATE: 18 BRPM | SYSTOLIC BLOOD PRESSURE: 103 MMHG | HEIGHT: 68 IN | DIASTOLIC BLOOD PRESSURE: 53 MMHG | BODY MASS INDEX: 27.5 KG/M2 | HEART RATE: 78 BPM | WEIGHT: 181.44 LBS

## 2021-04-05 PROBLEM — H53.2 DIPLOPIA: Status: ACTIVE | Noted: 2021-04-05

## 2021-04-05 PROBLEM — G93.2 IDIOPATHIC INTRACRANIAL HYPERTENSION: Status: ACTIVE | Noted: 2021-04-05

## 2021-04-05 LAB
ALBUMIN SERPL-MCNC: 3.7 G/DL (ref 3.5–5)
ALBUMIN/GLOB SERPL: 0.9 {RATIO} (ref 1.1–2.2)
ALP SERPL-CCNC: 49 U/L (ref 45–117)
ALT SERPL-CCNC: 37 U/L (ref 12–78)
ANION GAP SERPL CALC-SCNC: 7 MMOL/L (ref 5–15)
AST SERPL-CCNC: 15 U/L (ref 15–37)
BILIRUB SERPL-MCNC: 0.2 MG/DL (ref 0.2–1)
BUN SERPL-MCNC: 14 MG/DL (ref 6–20)
BUN/CREAT SERPL: 19 (ref 12–20)
CALCIUM SERPL-MCNC: 8.7 MG/DL (ref 8.5–10.1)
CHLORIDE SERPL-SCNC: 110 MMOL/L (ref 97–108)
CO2 SERPL-SCNC: 21 MMOL/L (ref 21–32)
CREAT SERPL-MCNC: 0.75 MG/DL (ref 0.55–1.02)
ERYTHROCYTE [DISTWIDTH] IN BLOOD BY AUTOMATED COUNT: 32.2 % (ref 11.5–14.5)
GLOBULIN SER CALC-MCNC: 4.3 G/DL (ref 2–4)
GLUCOSE SERPL-MCNC: 97 MG/DL (ref 65–100)
HCT VFR BLD AUTO: 36.5 % (ref 35–47)
HGB BLD-MCNC: 10 G/DL (ref 11.5–16)
MCH RBC QN AUTO: 16.6 PG (ref 26–34)
MCHC RBC AUTO-ENTMCNC: 27.4 G/DL (ref 30–36.5)
MCV RBC AUTO: 60.5 FL (ref 80–99)
NRBC # BLD: 0.02 K/UL (ref 0–0.01)
NRBC BLD-RTO: 0.3 PER 100 WBC
PLATELET # BLD AUTO: 371 K/UL (ref 150–400)
PMV BLD AUTO: ABNORMAL FL (ref 8.9–12.9)
POTASSIUM SERPL-SCNC: 3.8 MMOL/L (ref 3.5–5.1)
PROT SERPL-MCNC: 8 G/DL (ref 6.4–8.2)
RBC # BLD AUTO: 6.03 M/UL (ref 3.8–5.2)
SODIUM SERPL-SCNC: 138 MMOL/L (ref 136–145)
WBC # BLD AUTO: 7.5 K/UL (ref 3.6–11)

## 2021-04-05 PROCEDURE — 74011250637 HC RX REV CODE- 250/637: Performed by: PSYCHIATRY & NEUROLOGY

## 2021-04-05 PROCEDURE — 80053 COMPREHEN METABOLIC PANEL: CPT

## 2021-04-05 PROCEDURE — 82164 ANGIOTENSIN I ENZYME TEST: CPT

## 2021-04-05 PROCEDURE — 85027 COMPLETE CBC AUTOMATED: CPT

## 2021-04-05 PROCEDURE — 99218 HC RM OBSERVATION: CPT

## 2021-04-05 PROCEDURE — 94760 N-INVAS EAR/PLS OXIMETRY 1: CPT

## 2021-04-05 PROCEDURE — 94761 N-INVAS EAR/PLS OXIMETRY MLT: CPT

## 2021-04-05 PROCEDURE — 74011250637 HC RX REV CODE- 250/637: Performed by: HOSPITALIST

## 2021-04-05 PROCEDURE — 36415 COLL VENOUS BLD VENIPUNCTURE: CPT

## 2021-04-05 RX ORDER — ACETAZOLAMIDE 500 MG/1
500 CAPSULE, EXTENDED RELEASE ORAL EVERY 12 HOURS
Qty: 60 CAP | Refills: 0 | Status: SHIPPED | OUTPATIENT
Start: 2021-04-05 | End: 2021-04-27 | Stop reason: SDUPTHER

## 2021-04-05 RX ORDER — FERROUS SULFATE 324(65)MG
325 TABLET, DELAYED RELEASE (ENTERIC COATED) ORAL
Qty: 30 TAB | Refills: 0 | Status: SHIPPED | OUTPATIENT
Start: 2021-04-05 | End: 2022-03-07

## 2021-04-05 RX ORDER — POLYETHYLENE GLYCOL 3350 17 G/17G
17 POWDER, FOR SOLUTION ORAL DAILY
Qty: 30 PACKET | Refills: 0 | Status: SHIPPED | OUTPATIENT
Start: 2021-04-05 | End: 2022-03-07

## 2021-04-05 RX ADMIN — ACETAMINOPHEN 650 MG: 325 TABLET ORAL at 06:50

## 2021-04-05 RX ADMIN — ACETAZOLAMIDE 500 MG: 500 CAPSULE, EXTENDED RELEASE ORAL at 06:50

## 2021-04-05 RX ADMIN — THERA TABS 1 TABLET: TAB at 08:51

## 2021-04-05 RX ADMIN — Medication 10 ML: at 06:56

## 2021-04-05 NOTE — PROGRESS NOTES
End of Shift Note    Bedside shift change report given to Adriano (oncoming nurse) by Bassam Kmaara RN (offgoing nurse). Report included the following information SBAR, Kardex, MAR, Accordion, Recent Results and Cardiac Rhythm nsr to low sinus tachy    Shift worked:  4322-7422. Shift summary and any significant changes:     Spinal puncture performed today by Dr. Natalee Bal. Patient tolerated well. IV iron infusion given. Bowel movement today-FOBST sample sent. GI consult needs to be called in the am     Concerns for physician to address:  Patient wanted to know how often she would need to get chronic iron infusions if she were to opt for it. Zone phone for oncoming shift:          Activity:  Activity Level: Up ad damian  Number times ambulated in hallways past shift: 0  Number of times OOB to chair past shift: 0    Cardiac:   Cardiac Monitoring: Yes      Cardiac Rhythm: Normal sinus rhythm    Access:   Current line(s): PIV     Genitourinary:   Urinary status: voiding    Respiratory:   O2 Device: Room air  Chronic home O2 use?: NO  Incentive spirometer at bedside: NO     GI:  Last Bowel Movement Date: 04/04/21  Current diet:  DIET REGULAR  Passing flatus: YES  Tolerating current diet: YES       Pain Management:   Patient states pain is manageable on current regimen: YES    Skin:  Rafael Score: 23  Interventions: increase time out of bed and limit briefs    Patient Safety:  Fall Score:  Total Score: 1  Interventions: gripper socks and pt to call before getting OOB       Length of Stay:  Expected LOS: - - -  Actual LOS: 0      Bassam Kamara RN

## 2021-04-05 NOTE — PROGRESS NOTES
Problem: Falls - Risk of  Goal: *Absence of Falls  Description: Document Kristen York Fall Risk and appropriate interventions in the flowsheet.   Outcome: Resolved/Not Met     Problem: Patient Education: Go to Patient Education Activity  Goal: Patient/Family Education  Outcome: Resolved/Not Met     Problem: Anemia Care Plan (Adult and Pediatric)  Goal: *Labs within defined limits  Outcome: Resolved/Not Met  Goal: *Tolerates increased activity  Outcome: Resolved/Not Met     Problem: Patient Education: Go to Patient Education Activity  Goal: Patient/Family Education  Outcome: Resolved/Not Met

## 2021-04-05 NOTE — DISCHARGE INSTRUCTIONS
Patient Education        Iron Deficiency Anemia: Care Instructions  Your Care Instructions     Anemia means that you don't have enough red blood cells. Red blood cells carry oxygen around your body. When you have anemia, it can make you pale, weak, and tired. Many things can cause anemia. The most common cause is loss of blood. This can happen if you have heavy menstrual periods. It can also happen if you have bleeding in your stomach or bowel. You can also get anemia if you don't have enough iron in your diet or if it's hard for your body to absorb iron. In some cases, pregnancy causes anemia. That's because a pregnant woman needs more iron. Your doctor may do more tests to find the cause of your anemia. If a disease or other health problem is causing it, your doctor will treat that problem. It's important to follow up with your doctor to make sure that your iron level returns to normal.  Follow-up care is a key part of your treatment and safety. Be sure to make and go to all appointments, and call your doctor if you are having problems. It's also a good idea to know your test results and keep a list of the medicines you take. How can you care for yourself at home? · If your doctor recommended iron pills, take them as directed. ? Try to take the pills on an empty stomach. You can do this about 1 hour before or 2 hours after meals. But you may need to take iron with food to avoid an upset stomach. ? Do not take antacids or drink milk or anything with caffeine within 2 hours of when you take your iron. They can keep your body from absorbing the iron well. ? Vitamin C helps your body absorb iron. You may want to take iron pills with a glass of orange juice or some other food high in vitamin C.  ? Iron pills may cause stomach problems. These include heartburn, nausea, diarrhea, constipation, and cramps. It can help to drink plenty of fluids and include fruits, vegetables, and fiber in your diet.   ? It's normal for iron pills to make your stool a greenish or grayish black. But internal bleeding can also cause dark stool. So it's important to tell your doctor about any color changes. ? Call your doctor if you think you are having a problem with your iron pills. Even after you start to feel better, it will take several months for your body to build up its supply of iron. ? If you miss a pill, don't take a double dose. ? Keep iron pills out of the reach of small children. Too much iron can be very dangerous. · Eat foods with a lot of iron. These include red meat, shellfish, poultry, and eggs. They also include beans, raisins, whole-grain bread, and leafy green vegetables. · Steam your vegetables. This is the best way to prepare them if you want to get as much iron as possible. · Be safe with medicines. Do not take nonsteroidal anti-inflammatory pain relievers unless your doctor tells you to. These include aspirin, naproxen (Aleve), and ibuprofen (Advil, Motrin). · Liquid iron can stain your teeth. But you can mix it with water or juice and drink it with a straw. Then it won't get on your teeth. When should you call for help? Call 911 anytime you think you may need emergency care. For example, call if:    · You passed out (lost consciousness). Call your doctor now or seek immediate medical care if:    · You are short of breath.     · You are dizzy or light-headed, or you feel like you may faint.     · You have new or worse bleeding. Watch closely for changes in your health, and be sure to contact your doctor if:    · You feel weaker or more tired than usual.     · You do not get better as expected. Where can you learn more? Go to http://www.gray.com/  Enter Z825 in the search box to learn more about \"Iron Deficiency Anemia: Care Instructions. \"  Current as of: September 23, 2020               Content Version: 12.8  © 3131-3099 [employer], Inc[employer].    Care instructions adapted under license by [employer] (which disclaims liability or warranty for this information). If you have questions about a medical condition or this instruction, always ask your healthcare professional. [address]  any warranty or liability for your use of this information. Patient Discharge Instructions     Pt Name  Sophie Villanueva   Date of Birth 1985   Age  38 y.o. Medical Record Number  [id number]   PCP Lorraine Bone NP    Admit date:  4/3/2021 @    [address] [place] [address]    Room Number  8364/01   Date of Discharge 4/5/2021     Admission Diagnoses:     <principal problem not specified>        No Known Allergies     You were admitted to [address] [place] [address] for  <principal problem not specified>    YOUR OTHER MEDICAL DIAGNOSES INCLUDE (BUT NOT LIMITED TO ):  Present on Admission:   Symptomatic anemia   Orthostatic dizziness   Obesity (BMI 30-39. 9)   Iron deficiency anemia   Diplopia   Idiopathic intracranial hypertension      DIET:  Regular Diet   Oral Nutritional Supplements: No Oral Supplement prescribed  Supplement Frequency: As needed    Recommended activity: Activity as tolerated  Follow up :  GYN for uterine Fibroid  Follow-up Information     Follow up With Specialties Details Why Contact Info    Lorraine Bone NP Nurse Practitioner   [address]  [phone]      Lorraine Bone NP Nurse Practitioner In 1 week hospital follow up anemia, [address]  [phone]                   · It is important that you take the medication exactly as they are prescribed. · Keep your medication in the bottles provided by the pharmacist and keep a list of the medication names, dosages, and times to be taken in your wallet. · Do not take other medications without consulting your doctor.        ADDITIONAL INFORMATION: If you experience any of the following symptoms or have any health problem not listed below, then please call your primary care physician or return to the emergency room if you cannot get hold of your doctor: Fever, chills, nausea, vomiting, diarrhea, change in mentation, falling, bleeding, shortness of breath. I understand that if any problems occur once I am discharged, I am supposed to call my Primary care physician for further care or seek help in the Emergency Department at the nearest Healthcare facility. I have had an opportunity to discuss my clinical issues with my doctor and nursing staff. I understand and acknowledge receipt of the above instructions.                                                                                                                                            Physician's or R.N.'s Signature                                                            Date/Time                                                                                                                                              Patient or Representative Signature                                                 Date/Time

## 2021-04-05 NOTE — PROGRESS NOTES
Problem: Falls - Risk of  Goal: *Absence of Falls  Description: Document Jesenia Xavier Fall Risk and appropriate interventions in the flowsheet.   Outcome: Progressing Towards Goal  Note: Fall Risk Interventions:            Medication Interventions: Patient to call before getting OOB, Teach patient to arise slowly         History of Falls Interventions: Door open when patient unattended, Room close to nurse's station         Problem: Anemia Care Plan (Adult and Pediatric)  Goal: *Labs within defined limits  Outcome: Progressing Towards Goal  Goal: *Tolerates increased activity  Outcome: Progressing Towards Goal

## 2021-04-05 NOTE — DISCHARGE SUMMARY
Hospitalist Discharge Summary     Patient ID:  Huber Kc  [id number]  76 y.o.  1985  4/3/2021    PCP on record: Melo Umaña NP    Admit date: 4/3/2021  Discharge date and time: 4/5/2021    DISCHARGE DIAGNOSIS:    Symptomatic Iron deficiency Anemia POA  Orthostatic Dizziness POA  Obesity POA  Diplopia POA  Idiopathic Intracranial Hypertension POA      CONSULTATIONS:  IP CONSULT TO GASTROENTEROLOGY    Excerpted HPI from H&P of [address]any [address] MD:    Huber Kc is a 38 y.o. female with PMH iron deficiency, not compliant with oral iron due to hemorrhoid, who has been seeing neurology Dr. Greg Ramirez for 1 year of headache, blurred and double vision and was told on 3/31 she may have idiopathic intracranial HTN; LP was recommended.     This morning, got up after voiding and \"vision went out\" but was awake, lasted for 1-2 minutes, no worsened dizziness than usual, no chest pain, palpitations, sob. Now back to baseline vision (blurred, double x 1 year). Mild headache. No acute weakness or paresthesia, difficulty speaking. Spoke to Dr. Greg Ramirez who advised her to come to ER.     Work up in ER significant for hgb 5.2 with last known hgb 11.8 in 11/2018. HR increased 22 points with orthostatics. She reports having very heavy bleeding for the first 2 days of her period, going through superabsorbent tampon every hour. Denies bloody or black stool. No prior transfusion.       ______________________________________________________________________  DISCHARGE SUMMARY/HOSPITAL COURSE:  for full details see H&P, daily progress notes, labs, consult notes. The patient is a pleasant 38year old female with a past medical history significant for iron deficiency anemia. She has been unable to tolerate the oral iron due to hemorrhoids and issues with constipation from the iron. She has been followed by Neurology for one year for headaches, blurred and double vision.  On 3/31/21 she was told she may have idiopathic intracranial hypertension. She presented to the ED with worsening vision lasting 1-2 minutes,and headache. She spoke with Dr. Courtney Weeks who advised her to come to ER. On admission she was noted to have a hgB of 5.2 with last known hgB 11.8 on 11/2018. She was transfused with two units of PRBC's with improved hgB of 10.0 on discharge. Ferritin of one. She was given IV iron. She underwent LP with Dr. Courtney Weeks and will follow up as out patient for results. She was started on Diamox twice daily. She does report heavy vaginal bleeding her first two days of her menstrual cycle. Her last cycle was 3/18/21. She has had a tubaligation in the past. Transvaginal ultrasound reveals uterine fibroid. Discussed with patient and she will follow up as out patient with GYN. Discussed compliance with ferrous sulfate, discussed miralax daily and colace for iron induced constipation. Discussed importance of compliance. Patient agrees and verbalizes understanding. Discharge discussed with patient. All questions answered at this time. MRI brain w/o contrast 3/1/2021: IMPRESSION  1. Partially empty sella with mild enlargement of the optic nerve sheaths, but  without flattening of the posterior sclera. This may represent early signs of  idiopathic intracranial hypertension in the appropriate clinical context. 2. Otherwise normal appearance of the orbits and normal brain MRI. US Transvaginal 4/3/20/21: Transvaginal pelvic sonography performed.    The uterus measured 10.9 x 6.9 x 4.9 cm. The stripe is 1.2 cm. There is a 4.3 x  3.8 x 4 cm fibroid in the fundus. To the right of midline. Small nabothian cysts  are seen in the cervix. The right ovary measured 3.8 x 3 x 2.2 cm with normal flow. The left ovary  measured 2.7 x 2 x 1.7 cm with normal flow. IMPRESSION   impression: Uterine fibroid. Symptomatic Iron deficiency Anemia: ferrous sulfate 325 mg daily, follow up with pcp for monitoring.     Orthostatic Dizziness: Resolved    Obesity: diet and exercise modifications    Diplopia: follow up with Neurology as scheduled    Idiopathic Intracranial Hypertension: continue Diamox  _______________________________________________________________________  Patient seen and examined by me on discharge day. Pertinent Findings:  Gen:    Not in distress, pleasant  Chest: Clear lungs, no wheeze, no rales noted  CVS:   Regular rhythm. No edema  Abd:  Soft, not distended, not tender  Neuro:  Alert, not anxious  _______________________________________________________________________  DISCHARGE MEDICATIONS:   Current Discharge Medication List      START taking these medications    Details   acetaZOLAMIDE SR (DIAMOX) 500 mg capsule Take 1 Cap by mouth every twelve (12) hours. Indications: pseudotumor cerebri, a condition with high fluid pressure in the brain  Qty: 60 Cap, Refills: 0      polyethylene glycol (MIRALAX) 17 gram packet Take 1 Packet by mouth daily. Qty: 30 Packet, Refills: 0      ferrous sulfate 324 mg (65 mg iron) tablet Take 1 Tab by mouth Daily (before breakfast). Qty: 30 Tab, Refills: 0         CONTINUE these medications which have NOT CHANGED    Details   acetaminophen (TylenoL) 325 mg tablet Take 650 mg by mouth every six (6) hours as needed for Pain. aspirin/salicylamide/caffeine (BC HEADACHE POWDER PO) Take  by mouth as needed. multivitamin (ONE A DAY) tablet Take 1 Tab by mouth daily. tretinoin (RETIN-A) 0.1 % topical cream Apply  to affected area two (2) times daily as needed. Qty: 20 g, Refills: 0               Patient Follow Up Instructions: Activity: Activity as tolerated  Diet: Regular Diet  Wound Care: None    Follow-up with PCP in 1 week.   Follow-up tests/labs CBC    Follow-up Information     Follow up With Specialties Details Why Contact Info    Kale Bland, NP Nurse Practitioner   [facility] [address]  [phone]      Kale Pi, NP Nurse Practitioner In [facility] hospital follow up anemiaSarah [address]  [phone]          ________________________________________________________________    Risk of deterioration: Low    Condition at Discharge:  Stable  __________________________________________________________________    Disposition  Home with family, no needs    ____________________________________________________________________    Code Status: Full Code  ___________________________________________________________________      Total time in minutes spent coordinating this discharge (includes going over instructions, follow-up, prescriptions, and preparing report for sign off to her PCP) :  >30 minutes    Signed:  Haylie Parham NP

## 2021-04-05 NOTE — PROGRESS NOTES
End of Shift Note    Bedside shift change report given to [facility] [address] (oncoming nurse) by Michelle Grove (offgoing nurse). Report included the following information SBAR, Kardex, Intake/Output and MAR    Shift worked:  7p-7a     Shift summary and any significant changes:     Pt stable through shift. PRN Tylenol given at end of shift for headache. Labs drawn. Concerns for physician to address: D/C? Zone phone for oncoming shift:   [phone]       Activity:  Activity Level: Up ad damian  Number times ambulated in hallways past shift: 0  Number of times OOB to chair past shift: 1    Cardiac:   Cardiac Monitoring: Yes      Cardiac Rhythm: Normal sinus rhythm    Access:   Current line(s): PIV     Genitourinary:   Urinary status: voiding    Respiratory:   O2 Device: Room air  Chronic home O2 use?: NO  Incentive spirometer at bedside: NO     GI:  Last Bowel Movement Date: 04/04/21  Current diet:  DIET REGULAR  Passing flatus: YES  Tolerating current diet: YES       Pain Management:   Patient states pain is manageable on current regimen: YES    Skin:  Rafael Score: 23  Interventions: increase time out of bed    Patient Safety:  Fall Score:  Total Score: 1  Interventions: gripper socks, pt to call before getting OOB and stay with me (per policy)       Length of Stay:  Expected LOS: - - -  Actual LOS: 0      Michelle Grove

## 2021-04-06 ENCOUNTER — PATIENT OUTREACH (OUTPATIENT)
Dept: CASE MANAGEMENT | Age: 36
End: 2021-04-06

## 2021-04-06 ENCOUNTER — HOSPITAL ENCOUNTER (EMERGENCY)
Age: 36
Discharge: HOME | End: 2021-04-06
Attending: EMERGENCY MEDICINE | Admitting: EMERGENCY MEDICINE
Payer: COMMERCIAL

## 2021-04-06 VITALS
DIASTOLIC BLOOD PRESSURE: 61 MMHG | TEMPERATURE: 98.8 F | SYSTOLIC BLOOD PRESSURE: 113 MMHG | OXYGEN SATURATION: 100 % | RESPIRATION RATE: 14 BRPM | HEART RATE: 79 BPM

## 2021-04-06 DIAGNOSIS — G97.1 POST LUMBAR PUNCTURE HEADACHE: Primary | ICD-10-CM

## 2021-04-06 LAB
ABO + RH BLD: NORMAL
ACE SERPL-CCNC: 27 U/L (ref 14–82)
ALBUMIN SERPL-MCNC: 4.1 G/DL (ref 3.5–5)
ALBUMIN/GLOB SERPL: 0.8 {RATIO} (ref 1.1–2.2)
ALP SERPL-CCNC: 57 U/L (ref 45–117)
ALT SERPL-CCNC: 34 U/L (ref 12–78)
ANION GAP SERPL CALC-SCNC: 6 MMOL/L (ref 5–15)
ANTI-HU AB: NEGATIVE TITER
ANTI-RI AB: NEGATIVE TITER
ANTI-YO ANTIBODIES, 808955: NEGATIVE TITER
AST SERPL-CCNC: 9 U/L (ref 15–37)
BASOPHILS # BLD: 0.1 K/UL (ref 0–0.1)
BASOPHILS NFR BLD: 1 % (ref 0–1)
BILIRUB SERPL-MCNC: 0.4 MG/DL (ref 0.2–1)
BLOOD GROUP ANTIBODIES SERPL: NORMAL
BUN SERPL-MCNC: 17 MG/DL (ref 6–20)
BUN/CREAT SERPL: 24 (ref 12–20)
CALCIUM SERPL-MCNC: 9.4 MG/DL (ref 8.5–10.1)
CHLORIDE SERPL-SCNC: 110 MMOL/L (ref 97–108)
CO2 SERPL-SCNC: 22 MMOL/L (ref 21–32)
CREAT SERPL-MCNC: 0.72 MG/DL (ref 0.55–1.02)
DIFFERENTIAL METHOD BLD: ABNORMAL
EOSINOPHIL # BLD: 0 K/UL (ref 0–0.4)
EOSINOPHIL NFR BLD: 0 % (ref 0–7)
ERYTHROCYTE [DISTWIDTH] IN BLOOD BY AUTOMATED COUNT: 33.3 % (ref 11.5–14.5)
GLOBULIN SER CALC-MCNC: 4.9 G/DL (ref 2–4)
GLUCOSE SERPL-MCNC: 99 MG/DL (ref 65–100)
HCT VFR BLD AUTO: 35.1 % (ref 35–47)
HGB BLD-MCNC: 9.4 G/DL (ref 11.5–16)
IMM GRANULOCYTES # BLD AUTO: 0 K/UL (ref 0–0.04)
IMM GRANULOCYTES NFR BLD AUTO: 0 % (ref 0–0.5)
INR PPP: 1.1 (ref 0.9–1.1)
LYMPHOCYTES # BLD: 1 K/UL (ref 0.8–3.5)
LYMPHOCYTES NFR BLD: 10 % (ref 12–49)
MCH RBC QN AUTO: 16.6 PG (ref 26–34)
MCHC RBC AUTO-ENTMCNC: 26.8 G/DL (ref 30–36.5)
MCV RBC AUTO: 62 FL (ref 80–99)
MONOCYTES # BLD: 0.5 K/UL (ref 0–1)
MONOCYTES NFR BLD: 5 % (ref 5–13)
NEUTS SEG # BLD: 8 K/UL (ref 1.8–8)
NEUTS SEG NFR BLD: 84 % (ref 32–75)
NRBC # BLD: 0 K/UL (ref 0–0.01)
NRBC BLD-RTO: 0 PER 100 WBC
PLATELET # BLD AUTO: 302 K/UL (ref 150–400)
PMV BLD AUTO: ABNORMAL FL (ref 8.9–12.9)
POTASSIUM SERPL-SCNC: 3.6 MMOL/L (ref 3.5–5.1)
PROT SERPL-MCNC: 9 G/DL (ref 6.4–8.2)
PROTHROMBIN TIME: 11 SEC (ref 9–11.1)
RBC # BLD AUTO: 5.66 M/UL (ref 3.8–5.2)
RBC MORPH BLD: ABNORMAL
SODIUM SERPL-SCNC: 138 MMOL/L (ref 136–145)
SPECIMEN EXP DATE BLD: NORMAL
WBC # BLD AUTO: 9.6 K/UL (ref 3.6–11)

## 2021-04-06 PROCEDURE — 96375 TX/PRO/DX INJ NEW DRUG ADDON: CPT

## 2021-04-06 PROCEDURE — 99284 EMERGENCY DEPT VISIT MOD MDM: CPT

## 2021-04-06 PROCEDURE — 85025 COMPLETE CBC W/AUTO DIFF WBC: CPT

## 2021-04-06 PROCEDURE — 74011250636 HC RX REV CODE- 250/636: Performed by: EMERGENCY MEDICINE

## 2021-04-06 PROCEDURE — 36415 COLL VENOUS BLD VENIPUNCTURE: CPT

## 2021-04-06 PROCEDURE — 96365 THER/PROPH/DIAG IV INF INIT: CPT

## 2021-04-06 PROCEDURE — 85610 PROTHROMBIN TIME: CPT

## 2021-04-06 PROCEDURE — 86901 BLOOD TYPING SEROLOGIC RH(D): CPT

## 2021-04-06 PROCEDURE — 74011000250 HC RX REV CODE- 250: Performed by: EMERGENCY MEDICINE

## 2021-04-06 PROCEDURE — 80053 COMPREHEN METABOLIC PANEL: CPT

## 2021-04-06 RX ORDER — BUTALBITAL, ACETAMINOPHEN AND CAFFEINE 300; 40; 50 MG/1; MG/1; MG/1
1 CAPSULE ORAL
Qty: 20 CAP | Refills: 0 | Status: SHIPPED | OUTPATIENT
Start: 2021-04-06 | End: 2022-07-25 | Stop reason: ALTCHOICE

## 2021-04-06 RX ORDER — DIPHENHYDRAMINE HYDROCHLORIDE 50 MG/ML
25 INJECTION, SOLUTION INTRAMUSCULAR; INTRAVENOUS
Status: COMPLETED | OUTPATIENT
Start: 2021-04-06 | End: 2021-04-06

## 2021-04-06 RX ORDER — KETOROLAC TROMETHAMINE 30 MG/ML
15 INJECTION, SOLUTION INTRAMUSCULAR; INTRAVENOUS
Status: COMPLETED | OUTPATIENT
Start: 2021-04-06 | End: 2021-04-06

## 2021-04-06 RX ORDER — PROCHLORPERAZINE EDISYLATE 5 MG/ML
10 INJECTION INTRAMUSCULAR; INTRAVENOUS
Status: COMPLETED | OUTPATIENT
Start: 2021-04-06 | End: 2021-04-06

## 2021-04-06 RX ORDER — CAFFEINE AND SODIUM BENZOATE 125 MG/ML
500 INJECTION, SOLUTION INTRAMUSCULAR; INTRAVENOUS ONCE
Status: DISCONTINUED | OUTPATIENT
Start: 2021-04-06 | End: 2021-04-06

## 2021-04-06 RX ADMIN — KETOROLAC TROMETHAMINE 15 MG: 30 INJECTION, SOLUTION INTRAMUSCULAR at 13:21

## 2021-04-06 RX ADMIN — DIPHENHYDRAMINE HYDROCHLORIDE 25 MG: 50 INJECTION, SOLUTION INTRAMUSCULAR; INTRAVENOUS at 13:23

## 2021-04-06 RX ADMIN — SODIUM CHLORIDE 500 MG: 9 INJECTION, SOLUTION INTRAVENOUS at 13:37

## 2021-04-06 RX ADMIN — PROCHLORPERAZINE EDISYLATE 10 MG: 5 INJECTION INTRAMUSCULAR; INTRAVENOUS at 13:28

## 2021-04-06 NOTE — PROGRESS NOTES
Care Transitions Initial Follow Up Call    Call within 2 business days of discharge: Yes     Patient: Toni Rea Patient : 1985 MRN: [id number]    Last Discharge [address]       Complaint Diagnosis Description Type Department Provider    4/3/21 Syncope Symptomatic anemia . .. ED to Hosp-Admission (Discharged) (ADMIT) Mark Womack MD; April Agarwal... Was this an external facility discharge? No     21  10:43am  Called and spoke to patient, she is not feeling well-has a H/A can't open her eyes and when she raises her head she vomits. Patient is also feeling cool and then hot, then cool and hot. She has called her sister and is on way to ED. I will monitor ED visit and will call later today of D/C.       Dione Cruz MSN, RN, CCM / Care Transition Nurse / [phone]

## 2021-04-06 NOTE — DISCHARGE INSTRUCTIONS
It was a pleasure taking care of you in our Emergency Department today. We know that when you come to [facility], you are entrusting us with your health, comfort, and safety. Our physicians and nurses honor that trust, and truly appreciate the opportunity to care for you and your loved ones. We also value your feedback. If you receive a survey about your Emergency Department experience today, please fill it out. We care about our patients' feedback, and we listen to what you have to say. Thank you!

## 2021-04-06 NOTE — ED PROVIDER NOTES
EMERGENCY DEPARTMENT HISTORY AND PHYSICAL EXAM      Date: 4/6/2021  Patient Name: Deandra Robertson  Patient Age and Sex: 38 y.o. female     History of Presenting Illness     Chief Complaint   Patient presents with    Headache     recievd an LP on sunday and since has been having a severe headache    Vomiting     nausea and vomiting since leaving here       History Provided By: Patient    HPI: Deandra Robertson is a 38-year-old female with a history of idiopathic intracranial hypertension, iron deficiency anemia, presenting with headache, nausea and vomiting. Patient reports that she was here this past weekend and was admitted because of anemia and had to get 2 units of blood and while she was in the hospital, also had an LP done by her neurologist.  States that she was discharged on Monday morning and was feeling well but then Monday evening started to have terrible headache with nausea and vomiting. Has been unable to keep anything down has been feeling very dehydrated and sick. Denies any abdominal pain, cough, shortness of breath, fevers, diarrhea, numbness or weakness of one side versus another. Feeling generally weak. There are no other complaints, changes, or physical findings at this time. PCP: Akila Duncan NP    No current facility-administered medications on file prior to encounter. Current Outpatient Medications on File Prior to Encounter   Medication Sig Dispense Refill    acetaZOLAMIDE SR (DIAMOX) 500 mg capsule Take 1 Cap by mouth every twelve (12) hours. Indications: pseudotumor cerebri, a condition with high fluid pressure in the brain 60 Cap 0    polyethylene glycol (MIRALAX) 17 gram packet Take 1 Packet by mouth daily. 30 Packet 0    ferrous sulfate 324 mg (65 mg iron) tablet Take 1 Tab by mouth Daily (before breakfast). 30 Tab 0    acetaminophen (TylenoL) 325 mg tablet Take 650 mg by mouth every six (6) hours as needed for Pain.       aspirin/salicylamide/caffeine (BC HEADACHE POWDER PO) Take  by mouth as needed.  multivitamin (ONE A DAY) tablet Take 1 Tab by mouth daily.  tretinoin (RETIN-A) 0.1 % topical cream Apply  to affected area two (2) times daily as needed. 20 g 0       Past History     Past Medical History:  Past Medical History:   Diagnosis Date    Anemia 2019    iron deficiency anemia; stopped iron 1 year    COVID-19 virus infection 12/2020    Depression     Hemorrhoids        Past Surgical History:  Past Surgical History:   Procedure Laterality Date    HX GYN  2011       Family History:  Family History   Problem Relation Age of Onset    Hypertension Mother    [facility] Anxiety Mother     Neuropathy Mother     Elevated Lipids Mother     COPD Father     Glaucoma Father     Depression Father     Prostate Cancer Father     Hypertension Sister     No Known Problems Sister     Lupus Sister     Thyroid Disease Sister     Anemia Sister        Social History:  Social History     Tobacco Use    Smoking status: Never Smoker    Smokeless tobacco: Never Used   Substance Use Topics    Alcohol use: Yes     Frequency: 2-4 times a month     Comment: few shots a week    Drug use: No       Allergies:  No Known Allergies      Review of Systems   Review of Systems   Constitutional: Positive for fatigue. Negative for chills and fever. Respiratory: Negative for cough and shortness of breath. Cardiovascular: Negative for chest pain. Gastrointestinal: Positive for nausea and vomiting. Negative for abdominal pain, constipation and diarrhea. Genitourinary: Negative for dysuria, frequency and hematuria. Neurological: Positive for headaches. Negative for weakness and numbness. All other systems reviewed and are negative. Physical Exam   Physical Exam  Vitals signs and nursing note reviewed. Constitutional:       General: She is in acute distress. Appearance: She is well-developed.       Comments: Patient laying on her side and holding her head, in fetal position. HENT:      Head: Normocephalic and atraumatic. Nose: Nose normal.      Mouth/Throat:      Mouth: Mucous membranes are dry. Eyes:      Extraocular Movements: Extraocular movements intact. Conjunctiva/sclera: Conjunctivae normal.   Neck:      Musculoskeletal: Normal range of motion and neck supple. Cardiovascular:      Rate and Rhythm: Normal rate and regular rhythm. Pulmonary:      Effort: Pulmonary effort is normal. No respiratory distress. Breath sounds: Normal breath sounds. Abdominal:      General: There is no distension. Palpations: Abdomen is soft. Tenderness: There is no abdominal tenderness. Musculoskeletal: Normal range of motion. Skin:     General: Skin is warm and dry. Neurological:      General: No focal deficit present. Mental Status: She is alert and oriented to person, place, and time. Mental status is at baseline. Comments: Patient does have 5 out of 5 strength in all 4 extremities, appears tired. Appears slightly pale. Psychiatric:         Mood and Affect: Mood normal.          Diagnostic Study Results     Labs -     Recent Results (from the past 12 hour(s))   CBC WITH AUTOMATED DIFF    Collection Time: 04/06/21 12:48 PM   Result Value Ref Range    WBC 9.6 3.6 - 11.0 K/uL    RBC 5.66 (H) 3.80 - 5.20 M/uL    HGB 9.4 (L) 11.5 - 16.0 g/dL    HCT 35.1 35.0 - 47.0 %    MCV 62.0 (L) 80.0 - 99.0 FL    MCH 16.6 (L) 26.0 - 34.0 PG    MCHC 26.8 (L) 30.0 - 36.5 g/dL    RDW 33.3 (H) 11.5 - 14.5 %    PLATELET 892 291 - 537 K/uL    MPV NOTIFIED BAYRON WATERS RN AT 1419 AJH 8.9 - 12.9 FL    NRBC 0.0 0  WBC    ABSOLUTE NRBC 0.00 0.00 - 0.01 K/uL    NEUTROPHILS 84 (H) 32 - 75 %    LYMPHOCYTES 10 (L) 12 - 49 %    MONOCYTES 5 5 - 13 %    EOSINOPHILS 0 0 - 7 %    BASOPHILS 1 0 - 1 %    IMMATURE GRANULOCYTES 0 0.0 - 0.5 %    ABS. NEUTROPHILS 8.0 1.8 - 8.0 K/UL    ABS. LYMPHOCYTES 1.0 0.8 - 3.5 K/UL    ABS. MONOCYTES 0.5 0.0 - 1.0 K/UL    ABS. EOSINOPHILS 0.0 0.0 - 0.4 K/UL    ABS. BASOPHILS 0.1 0.0 - 0.1 K/UL    ABS. IMM. GRANS. 0.0 0.00 - 0.04 K/UL    DF SMEAR SCANNED      RBC COMMENTS ANISOCYTOSIS  2+        RBC COMMENTS MICROCYTOSIS  2+        RBC COMMENTS HYPOCHROMIA  2+        RBC COMMENTS SCHISTOCYTES  1+       METABOLIC PANEL, COMPREHENSIVE    Collection Time: 04/06/21 12:48 PM   Result Value Ref Range    Sodium 138 136 - 145 mmol/L    Potassium 3.6 3.5 - 5.1 mmol/L    Chloride 110 (H) 97 - 108 mmol/L    CO2 22 21 - 32 mmol/L    Anion gap 6 5 - 15 mmol/L    Glucose 99 65 - 100 mg/dL    BUN 17 6 - 20 MG/DL    Creatinine 0.72 0.55 - 1.02 MG/DL    BUN/Creatinine ratio 24 (H) 12 - 20      GFR est AA >60 >60 ml/min/1.73m2    GFR est non-AA >60 >60 ml/min/1.73m2    Calcium 9.4 8.5 - 10.1 MG/DL    Bilirubin, total 0.4 0.2 - 1.0 MG/DL    ALT (SGPT) 34 12 - 78 U/L    AST (SGOT) 9 (L) 15 - 37 U/L    Alk. phosphatase 57 45 - 117 U/L    Protein, total 9.0 (H) 6.4 - 8.2 g/dL    Albumin 4.1 3.5 - 5.0 g/dL    Globulin 4.9 (H) 2.0 - 4.0 g/dL    A-G Ratio 0.8 (L) 1.1 - 2.2     TYPE & SCREEN    Collection Time: 04/06/21 12:49 PM   Result Value Ref Range    Crossmatch Expiration 04/09/2021,2359     ABO/Rh(D) A POSITIVE     Antibody screen NEG    PROTHROMBIN TIME + INR    Collection Time: 04/06/21 12:49 PM   Result Value Ref Range    INR 1.1 0.9 - 1.1      Prothrombin time 11.0 9.0 - 11.1 sec       Radiologic Studies -   No orders to display     CT Results  (Last 48 hours)    None        CXR Results  (Last 48 hours)    None            Medical Decision Making   I am the first provider for this patient. I reviewed the vital signs, available nursing notes, past medical history, past surgical history, family history and social history. Vital Signs-Reviewed the patient's vital signs.   Patient Vitals for the past 12 hrs:   Temp Pulse Resp BP SpO2   04/06/21 1450 -- 79 -- -- 100 %   04/06/21 1445 -- 82 -- 113/61 100 %   04/06/21 1404 -- -- -- -- 100 %   04/06/21 1400 -- 81 -- 113/61 100 %   04/06/21 1330 -- -- -- 130/67 100 %   04/06/21 1329 -- -- -- -- 98 %   04/06/21 1315 -- -- -- 121/70 100 %   04/06/21 1300 -- -- -- 117/66 100 %   04/06/21 1209 98.8 °F (37.1 °C) (!) 123 14 116/60 100 %       Records Reviewed: Nursing Notes and Old Medical Records    Provider Notes (Medical Decision Making):   Patient presenting for headache, nausea and vomiting. Regarding the headache, could be all related to her IH versus is post LP headache. We will plan to place IV, give her medications to help with a headache including caffeine and reassess. If no improvement, will consider a blood patch. She also does appear slightly dehydrated so will get lab work to check her hemoglobin and her electrolytes and kidney function to determine if there is any need for more fluids or transfusion. ED Course:   Initial assessment performed. The patients presenting problems have been discussed, and they are in agreement with the care plan formulated and outlined with them. I have encouraged them to ask questions as they arise throughout their visit. ED Course as of Apr 06 2208   Tue Apr 06, 2021   1359 After just receiving the Toradol, Compazine, Benadryl and some fluids, patient states that her headache has gone down from a 10 down to an 8. Currently getting the caffeine which will infuse over 2 hours. [JS]   8452 Patient feeling much better after getting the caffeine. She is safe to be discharged home. Her hemoglobin came back much better than before at 9.4.    [JS]      ED Course User Index  [JS] Shamika Downs MD     Critical Care Time:   0    Disposition:  Discharge Note:  The patient has been re-evaluated and is ready for discharge. Reviewed available results with patient. Counseled patient on diagnosis and care plan. Patient has expressed understanding, and all questions have been answered.  Patient agrees with plan and agrees to follow up as recommended, or to return to the ED if their symptoms worsen. Discharge instructions have been provided and explained to the patient, along with reasons to return to the ED. PLAN:  Discharge Medication List as of 4/6/2021  2:27 PM      START taking these medications    Details   butalbital-acetaminophen-caff (Fioricet) -40 mg per capsule Take 1 Cap by mouth every six (6) hours as needed for Headache., Normal, Disp-20 Cap, R-0         CONTINUE these medications which have NOT CHANGED    Details   acetaZOLAMIDE SR (DIAMOX) 500 mg capsule Take 1 Cap by mouth every twelve (12) hours. Indications: pseudotumor cerebri, a condition with high fluid pressure in the brain, Print, Disp-60 Cap, R-0      polyethylene glycol (MIRALAX) 17 gram packet Take 1 Packet by mouth daily. , Print, Disp-30 Packet, R-0      ferrous sulfate 324 mg (65 mg iron) tablet Take 1 Tab by mouth Daily (before breakfast). , Print, Disp-30 Tab, R-0      acetaminophen (TylenoL) 325 mg tablet Take 650 mg by mouth every six (6) hours as needed for Pain., Historical Med      aspirin/salicylamide/caffeine (BC HEADACHE POWDER PO) Take  by mouth as needed., Historical Med      multivitamin (ONE A DAY) tablet Take 1 Tab by mouth daily. , Historical Med      tretinoin (RETIN-A) 0.1 % topical cream Apply  to affected area two (2) times daily as needed., Normal, Disp-20 g, R-0           2. Follow-up Information     Follow up With Specialties Details Why Contact John Mata Junior, NP Nurse Practitioner  As needed [address]  [phone]          3. Return to ED if worse     Diagnosis     Clinical Impression:   1. Post lumbar puncture headache        Attestations:    Gardenia Edward M.D. Please note that this dictation was completed with [employer], the computer voice recognition software. Quite often unanticipated grammatical, syntax, homophones, and other interpretive errors are inadvertently transcribed by the computer software.   Please disregard these errors. Please excuse any errors that have escaped final proofreading. Thank you.

## 2021-04-07 LAB
ABO + RH BLD: NORMAL
BLD PROD TYP BPU: NORMAL
BLOOD GROUP ANTIBODIES SERPL: NORMAL
BPU ID: NORMAL
CROSSMATCH RESULT,%XM: NORMAL
OLIGOCLONAL BANDS.IT SER+CSF QL: NORMAL
SPECIMEN EXP DATE BLD: NORMAL
STATUS OF UNIT,%ST: NORMAL
UNIT DIVISION, %UDIV: 0

## 2021-04-08 ENCOUNTER — OFFICE VISIT (OUTPATIENT)
Dept: FAMILY MEDICINE CLINIC | Age: 36
End: 2021-04-08
Payer: COMMERCIAL

## 2021-04-08 VITALS
TEMPERATURE: 98 F | SYSTOLIC BLOOD PRESSURE: 118 MMHG | HEART RATE: 82 BPM | WEIGHT: 175.8 LBS | DIASTOLIC BLOOD PRESSURE: 60 MMHG | HEIGHT: 68 IN | BODY MASS INDEX: 26.64 KG/M2 | RESPIRATION RATE: 12 BRPM | OXYGEN SATURATION: 100 %

## 2021-04-08 DIAGNOSIS — Z09 HOSPITAL DISCHARGE FOLLOW-UP: Primary | ICD-10-CM

## 2021-04-08 DIAGNOSIS — D50.9 IRON DEFICIENCY ANEMIA, UNSPECIFIED IRON DEFICIENCY ANEMIA TYPE: ICD-10-CM

## 2021-04-08 DIAGNOSIS — G93.2 PSEUDOTUMOR CEREBRI: ICD-10-CM

## 2021-04-08 DIAGNOSIS — N92.0 MENORRHAGIA WITH REGULAR CYCLE: ICD-10-CM

## 2021-04-08 DIAGNOSIS — E23.6 EMPTY SELLA (HCC): ICD-10-CM

## 2021-04-08 PROCEDURE — 99213 OFFICE O/P EST LOW 20 MIN: CPT | Performed by: NURSE PRACTITIONER

## 2021-04-08 PROCEDURE — 1111F DSCHRG MED/CURRENT MED MERGE: CPT | Performed by: NURSE PRACTITIONER

## 2021-04-08 NOTE — PROGRESS NOTES
Chief Complaint   Patient presents with   [facility] Follow Up     4/3/21-4/5/21 & 4/6/21       1. Have you been to the ER, urgent care clinic since your last visit? Hospitalized since your last visit? No    2. Have you seen or consulted any other health care providers outside of the [address] [place] [address] since your last visit? Include any pap smears or colon screening.  No    Health Maintenance Due   Topic Date Due    Hepatitis C Screening  Never done    A1C test (Diabetic or Prediabetic)  11/27/2019    PAP AKA CERVICAL CYTOLOGY  03/29/2020

## 2021-04-08 NOTE — PROGRESS NOTES
Care Transitions Initial Follow Up Call    Call within 2 business days of discharge: Yes     Patient: Rusty Anton Patient : 1985 MRN: [id number]    Last Discharge [facility]       Complaint Diagnosis Description Type Department Provider    21 Headache; Vomiting Post lumbar puncture headache ED (DISCHARGE) JEWEL Prado MD          Was this an external facility discharge? Challenges to be reviewed by the provider   Additional needs identified to be addressed with provider yes  advance care planning- Patient does not have an ACP. Last Hgb was 9.4 on 21. Was 5.2 on admit and given 2 units RBC. Method of communication with provider : staff message    Discussed [phone] related testing which was not done at this time. Test results were not done. Patient informed of results, if available? yes     Advance Care Planning:   Does patient have an Advance Directive: decision makers updated     Inpatient Readmission Risk score: No data recorded  Was this a readmission? no   Patient stated reason for the admission:     Patients top risk factors for readmission: medical condition   Interventions to address risk factors: Scheduled appointment with PCP-Jazmin Valdez NP today at 3pm and Assessment and support for treatment adherence and medication management-Further H/A, N/V    Care Transition Nurse (CTN) contacted the patient by telephone to perform post hospital discharge assessment. Verified name and  with patient as identifiers. Provided introduction to self, and explanation of the CTN role. CTN reviewed discharge instructions, medical action plan and red flags with patient who verbalized understanding. Were discharge instructions available to patient? yes. Reviewed appropriate site of care based on symptoms and resources available to patient including: PCP. Patient given an opportunity to ask questions and does not have any further questions or concerns at this time.  The patient agrees to contact the PCP office for questions related to their healthcare. Medication reconciliation was performed with patient, who verbalizes understanding of administration of home medications. Referral to Pharm D needed: no     Home Health/Outpatient orders at discharge: none  Durable Medical Equipment ordered at discharge: None  Covid Risk Education    Patient has following risk factors of: HTN and has COVID already. Education provided regarding infection prevention, and signs and symptoms of COVID-19 and when to seek medical attention with patient who verbalized understanding. Discussed exposure protocols and quarantine From CDC: Are you at higher risk for severe illness?  and given an opportunity for questions and concerns. The patient agrees to contact the COVID-19 hotline [phone] or PCP office for questions related to COVID-19. For more information on steps you can take to protect yourself, see CDC's How to Protect Yourself     Was patient discharged with a pulse oximeter? no   Discussed follow-up appointments. If no appointment was previously scheduled, appointment scheduling offered: yes Is follow up appointment scheduled within 7 days of discharge? yes   [address] follow up appointment(s):   Future Appointments   Date Time Provider Janine Gómez   4/8/2021  3:00 PM Wiliam Gtz NP [facility] BS AMB   5/6/2021 11:00 AM Dede Montgomery MD CDE BS AMB   7/6/2021  3:20 PM Kaleb Bryant MD [facility] BS [facility]     Non-[facility] follow up appointment(s): none    Plan for follow-up call in 10-14 days based on severity of symptoms and risk factors. Plan for next call: self management-Monitor for further H/A, N/V dizziness, B/P and follow up appointment-Today with PCP at 3pm  CTN provided contact information for future needs. Goals Addressed                 This Visit's Progress     Attends follow-up appointments as directed.         04/08/21   -Patient has an appt with her PCP Zelda Fonseca NP today at 3p. Patient son is on Spring break and will be driving her there. Monitor status of PCP appt on next call. Mary Cruz MSN, RN, CCM / Care Transition Nurse / [phone]        Understands red flags post discharge. 04/08/21   -[facility] with Anemia, also had a LP. -Patient to ED on 4/7 with H/A, N/V. Patient given IV fluids and caffeine.    -Patient states that she feels much better today, only has a slight H/A, no N/V, is taking PO well. Monitor for further H/A, N/V, and dizziness on next call.     Mary Cruz MSN, RN, CCM / Care Transition Nurse / [phone]

## 2021-04-08 NOTE — PROGRESS NOTES
Transitional Care Management Progress Note    Patient: Abby Byers  : 1985  PCP: Kellen Jj NP    Date of office visit: 2021   Date of admission: 4/3/21  Date of discharge: 21  Hospital: [facility]    Call initiated w/i 2 business dates of discharge: Yes   Date of the most recent call to the patient: 2021  9:54 AM      Assessment/Plan:     Diagnoses and all orders for this visit:    1. Hospital discharge follow-up  -     [facility] DISCHARGE MEDS RECONCILED W/ CURRENT OUTPATIENT MED LIST    2. Iron deficiency anemia, unspecified iron deficiency anemia type - referral to hematology. Scheduled lab only next week to recheck CBC, iron profile, ferritin  -     REFERRAL TO HEMATOLOGY    3. Pseudotumor cerebri - per neuro. Had LP done. 4. Empty sella ([address] - scheduled to see endo on 21    5. Menorrhagia with regular cycle - to see GYN      Follow-up and Dispositions    · Return in about 4 months (around 2021), or if symptoms worsen or fail to improve. Subjective:   Abby Byers is a 38 y.o. female presenting today for follow-up after hospital discharge. This encounter and supporting documentation was reviewed if available. Medication reconciliation was performed today. The main problem requiring admission was anemia  . Complications during admission: none  :  EXCERPTED FROM DISCHARGE SUMMARY  DISCHARGE SUMMARY/HOSPITAL COURSE:  for full details see H&P, daily progress notes, labs, consult notes.      The patient is a pleasant 38year old female with a past medical history significant for iron deficiency anemia. She has been unable to tolerate the oral iron due to hemorrhoids and issues with constipation from the iron. She has been followed by Neurology for one year for headaches, blurred and double vision. On 3/31/21 she was told she may have idiopathic intracranial hypertension.  She presented to the ED with worsening vision lasting 1-2 minutes,and headache. She spoke with Dr. Courtney Weeks who advised her to come to ER. On admission she was noted to have a hgB of 5.2 with last known hgB 11.8 on 11/2018. She was transfused with two units of PRBC's with improved hgB of 10.0 on discharge. Ferritin of one. She was given IV iron. She underwent LP with Dr. Courtney Weeks and will follow up as out patient for results. She was started on Diamox twice daily. She does report heavy vaginal bleeding her first two days of her menstrual cycle. Her last cycle was 3/18/21. She has had a tubaligation in the past. Transvaginal ultrasound reveals uterine fibroid. Discussed with patient and she will follow up as out patient with GYN. Discussed compliance with ferrous sulfate, discussed miralax daily and colace for iron induced constipation. Discussed importance of compliance. Patient agrees and verbalizes understanding. Discharge discussed with patient. All questions answered at this time.        MRI brain w/o contrast 3/1/2021: IMPRESSION  1. Partially empty sella with mild enlargement of the optic nerve sheaths, but  without flattening of the posterior sclera. This may represent early signs of  idiopathic intracranial hypertension in the appropriate clinical context. 2. Otherwise normal appearance of the orbits and normal brain MRI.     US Transvaginal 4/3/20/21: Transvaginal pelvic sonography performed.    The uterus measured 10.9 x 6.9 x 4.9 cm. The stripe is 1.2 cm. There is a 4.3 x  3.8 x 4 cm fibroid in the fundus. To the right of midline. Small nabothian cysts  are seen in the cervix. The right ovary measured 3.8 x 3 x 2.2 cm with normal flow. The left ovary  measured 2.7 x 2 x 1.7 cm with normal flow.   IMPRESSION   impression: Uterine fibroid.     Symptomatic Iron deficiency Anemia: ferrous sulfate 325 mg daily, follow up with pcp for monitoring.     Orthostatic Dizziness: Resolved     Obesity: diet and exercise modifications     Diplopia: follow up with Neurology as scheduled     Idiopathic Intracranial Hypertension: continue Diamox    Interval history/Current status:   Patient was seen in ED on 4/6/21 for headache, N/V from LP. Was treated and released. Repeat Hgb was 9.4 at ED visit. She is interested in seeing hematology for iron infusions as she does not tolerate oral iron (causes gI upset and constipation). She is taking once daily with miralax and stool softener now. She has appt with endocrinology 5/6/21 for empty sella syndrome  Has appt with neuro in July 2021. Will reach out to office next week for LP results. Scheduled to see GYN to discuss fibroids and menorrhagia    Admitting symptoms have: improved    Prior to Admission medications    Medication Sig Start Date End Date Taking? Authorizing Provider   butalbital-acetaminophen-caff (Fioricet) -40 mg per capsule Take 1 Cap by mouth every six (6) hours as needed for Headache. 4/6/21  Yes Ofelia Gonzalez MD   acetaZOLAMIDE SR (DIAMOX) 500 mg capsule Take 1 Cap by mouth every twelve (12) hours. Indications: pseudotumor cerebri, a condition with high fluid pressure in the brain 4/5/21  Yes Kirill Cole NP   polyethylene glycol (MIRALAX) 17 gram packet Take 1 Packet by mouth daily. 4/5/21  Yes Kirill Cole NP   ferrous sulfate 324 mg (65 mg iron) tablet Take 1 Tab by mouth Daily (before breakfast). 4/5/21  Yes Kirill Cole NP   acetaminophen (TylenoL) 325 mg tablet Take 650 mg by mouth every six (6) hours as needed for Pain. Yes Provider, Historical   aspirin/salicylamide/caffeine (BC HEADACHE POWDER PO) Take  by mouth as needed. Yes Provider, Historical   multivitamin (ONE A DAY) tablet Take 1 Tab by mouth daily. Yes Provider, Historical   tretinoin (RETIN-A) 0.1 % topical cream Apply  to affected area two (2) times daily as needed.   Patient not taking: Reported on 4/8/2021 6/15/18   Juan Ryan NP    ROS:  History obtained from chart review and the patient  General ROS: positive for  - fatigue  negative for - chills, fever or sleep disturbance  Ophthalmic ROS: positive for - blurry vision and double vision  negative for - loss of vision or photophobia  Respiratory ROS: no cough, shortness of breath, or wheezing  Cardiovascular ROS: no chest pain or dyspnea on exertion  Gastrointestinal ROS: positive for - constipation  negative for - abdominal pain, appetite loss, blood in stools, change in bowel habits or nausea/vomiting       Patient Active Problem List   Diagnosis Code    Depression with anxiety F41.8    Obesity (BMI 30-39. 9) E66.9    Iron deficiency anemia D50.9    Prediabetes R73.03    Vitamin D deficiency E55.9    Moderate major depression (HCC) F32.1    Symptomatic anemia D64.9    Orthostatic dizziness R42    Diplopia H53.2    Idiopathic intracranial hypertension G93.2     Current Outpatient Medications   Medication Sig Dispense Refill    butalbital-acetaminophen-caff (Fioricet) -40 mg per capsule Take 1 Cap by mouth every six (6) hours as needed for Headache. 20 Cap 0    acetaZOLAMIDE SR (DIAMOX) 500 mg capsule Take 1 Cap by mouth every twelve (12) hours. Indications: pseudotumor cerebri, a condition with high fluid pressure in the brain 60 Cap 0    polyethylene glycol (MIRALAX) 17 gram packet Take 1 Packet by mouth daily. 30 Packet 0    ferrous sulfate 324 mg (65 mg iron) tablet Take 1 Tab by mouth Daily (before breakfast). 30 Tab 0    acetaminophen (TylenoL) 325 mg tablet Take 650 mg by mouth every six (6) hours as needed for Pain.  aspirin/salicylamide/caffeine (BC HEADACHE POWDER PO) Take  by mouth as needed.  multivitamin (ONE A DAY) tablet Take 1 Tab by mouth daily.        No Known Allergies  Past Medical History:   Diagnosis Date    Anemia 2019    iron deficiency anemia; stopped iron 1 year    COVID-19 virus infection 12/2020    Depression     Hemorrhoids      Past Surgical History:   Procedure Laterality Date   Kaden Callahan GYN  2011 Family History   Problem Relation Age of Onset    Hypertension Mother    Neri Talbot Anxiety Mother     Neuropathy Mother     Elevated Lipids Mother    Neri Talbot COPD Father     Glaucoma Father     Depression Father     Prostate Cancer Father     Hypertension Sister     No Known Problems Sister     Lupus Sister     Thyroid Disease Sister     Anemia Sister      Social History     Tobacco Use    Smoking status: Never Smoker    Smokeless tobacco: Never Used   Substance Use Topics    Alcohol use: Yes     Frequency: 2-4 times a month     Comment: few shots a week          Objective:     Visit Vitals  /60 (BP 1 Location: Right arm, BP Patient Position: Sitting, BP Cuff Size: Adult)   Pulse 82   Temp 98 °F (36.7 °C) (Skin)   Resp 12   Ht 5' 8\" (1.727 m)   Wt 175 lb 12.8 oz (79.7 kg)   LMP 03/18/2021   SpO2 100%   BMI 26.73 kg/m²        Physical Exam  Vitals signs reviewed. Constitutional:       Appearance: Normal appearance. She is well-developed and well-groomed. HENT:      Right Ear: Hearing normal.      Left Ear: Hearing normal.   Neck:      Thyroid: No thyromegaly. Cardiovascular:      Rate and Rhythm: Normal rate and regular rhythm. Pulses:           Dorsalis pedis pulses are 2+ on the right side and 2+ on the left side. Heart sounds: Normal heart sounds, S1 normal and S2 normal.   Pulmonary:      Effort: Pulmonary effort is normal.      Breath sounds: Normal breath sounds. Musculoskeletal:      Right lower leg: No edema. Left lower leg: No edema. Lymphadenopathy:      Cervical: No cervical adenopathy. Skin:     General: Skin is warm and dry. Neurological:      Mental Status: She is alert and oriented to person, place, and time. Psychiatric:         Attention and Perception: Attention normal.         Mood and Affect: Mood and affect normal.         Speech: Speech normal.         Behavior: Behavior normal. Behavior is cooperative. Thought Content:  Thought content normal. Cognition and Memory: Cognition and memory normal.          We discussed the expected course, resolution and complications of the diagnosis(es) in detail. Medication risks, benefits, costs, interactions, and alternatives were discussed as indicated. I advised her to contact the office if her condition worsens, changes or fails to improve as anticipated. She expressed understanding with the diagnosis(es) and plan.      Sara Hernandez, NP

## 2021-04-11 LAB
BACTERIA SPEC CULT: NORMAL
BACTERIA SPEC CULT: NORMAL
GRAM STN SPEC: NORMAL
GRAM STN SPEC: NORMAL
SERVICE CMNT-IMP: NORMAL

## 2021-04-15 ENCOUNTER — OFFICE VISIT (OUTPATIENT)
Dept: FAMILY MEDICINE CLINIC | Age: 36
End: 2021-04-15

## 2021-04-15 DIAGNOSIS — D50.9 IRON DEFICIENCY ANEMIA, UNSPECIFIED IRON DEFICIENCY ANEMIA TYPE: Primary | ICD-10-CM

## 2021-04-15 LAB — ANTI-HU, CSF,HUSF: NORMAL TITER

## 2021-04-15 NOTE — PROGRESS NOTES
Channing Tyson (: 1985) is a 38 y.o. female, established patient, here for evaluation of the following chief complaint(s):  LAB ONLY       ASSESSMENT/PLAN:  1. Iron deficiency anemia, unspecified iron deficiency anemia type  -     CBC WITH AUTOMATED DIFF; Future  -     IRON PROFILE; Future  -     FERRITIN; Future    An electronic signature was used to authenticate this note.   -- Samantha Giles NP

## 2021-04-16 LAB
BASOPHILS # BLD AUTO: 0.1 X10E3/UL (ref 0–0.2)
BASOPHILS NFR BLD AUTO: 2 %
EOSINOPHIL # BLD AUTO: 0.1 X10E3/UL (ref 0–0.4)
EOSINOPHIL NFR BLD AUTO: 3 %
ERYTHROCYTE [DISTWIDTH] IN BLOOD BY AUTOMATED COUNT: 32.2 % (ref 11.7–15.4)
FERRITIN SERPL-MCNC: 26 NG/ML (ref 15–150)
HCT VFR BLD AUTO: 36.8 % (ref 34–46.6)
HGB BLD-MCNC: 9.9 G/DL (ref 11.1–15.9)
IMM GRANULOCYTES # BLD AUTO: 0 X10E3/UL (ref 0–0.1)
IMM GRANULOCYTES NFR BLD AUTO: 0 %
IRON SATN MFR SERPL: 4 % (ref 15–55)
IRON SERPL-MCNC: 18 UG/DL (ref 27–159)
LYMPHOCYTES # BLD AUTO: 1.6 X10E3/UL (ref 0.7–3.1)
LYMPHOCYTES NFR BLD AUTO: 34 %
MCH RBC QN AUTO: 18 PG (ref 26.6–33)
MCHC RBC AUTO-ENTMCNC: 26.9 G/DL (ref 31.5–35.7)
MCV RBC AUTO: 67 FL (ref 79–97)
MONOCYTES # BLD AUTO: 0.3 X10E3/UL (ref 0.1–0.9)
MONOCYTES NFR BLD AUTO: 7 %
MORPHOLOGY BLD-IMP: ABNORMAL
NEUTROPHILS # BLD AUTO: 2.4 X10E3/UL (ref 1.4–7)
NEUTROPHILS NFR BLD AUTO: 54 %
PLATELET # BLD AUTO: 383 X10E3/UL (ref 150–450)
RBC # BLD AUTO: 5.51 X10E6/UL (ref 3.77–5.28)
TIBC SERPL-MCNC: 433 UG/DL (ref 250–450)
UIBC SERPL-MCNC: 415 UG/DL (ref 131–425)
WBC # BLD AUTO: 4.6 X10E3/UL (ref 3.4–10.8)

## 2021-04-18 NOTE — PROGRESS NOTES
Hemoglobin stable at 9.9. Iron and ferritin have come up from 2 weeks ago.   See hematologist on 4/22/21

## 2021-04-20 ENCOUNTER — PATIENT OUTREACH (OUTPATIENT)
Dept: CASE MANAGEMENT | Age: 36
End: 2021-04-20

## 2021-04-20 NOTE — PROGRESS NOTES
Goals Addressed                 This Visit's Progress     COMPLETED: Attends follow-up appointments as directed. 04/08/21   -Patient has an appt with her PCP Lynnette Jalloh NP today at 3p. Patient son is on Spring break and will be driving her there. Monitor status of PCP appt on next call. Zac RUIZ, RN, CCM / Care Transition Nurse / [phone]     04/20/21   -Patient has been seen by PCP and also OB/GYN yesterday. Zac RUIZ, RN, CCM / Care Transition Nurse / [phone]        Understands red flags post discharge. On track     04/08/21   -Patient admitted to [facility] 4/3-4/5 with Anemia, also had a LP. -Patient to ED on 4/7 with H/A, N/V. Patient given IV fluids and caffeine.    -Patient states that she feels much better today, only has a slight H/A, no N/V, is taking PO well. Monitor for further H/A, N/V, and dizziness on next call. Zac RUIZ, RN, CCM / Care Transition Nurse / [phone]     04/20/21   -Patient states that she is feeling well, has a slight H/A and that is relieved with Tylenol and Fioricet. Patient denies dizziness and is taking PO well. Patient has been seen by OB/GYN and having OP laser procedure on 5/19/21 at [facility] for her fibroids. Monitor her H/A and dizziness and menstrual bleeding on next call.     Zac RUIZ, RN, CCM / Care Transition Nurse / [phone]

## 2021-04-21 NOTE — PROGRESS NOTES
Chivo Singh is a 38 y.o. female here for virtual visit new patient appt for anemia. Referred by Dr Carroll Calderon. 1. Have you been to the ER, urgent care clinic since your last visit? Hospitalized since your last visit? New Pt     2. Have you seen or consulted any other health care providers outside of the [address] [place] [address] since your last visit? Include any pap smears or colon screening. New Pt    Pt works at [employer]. Pt states she has been a little tired but still adjusting to the Diomox medication.

## 2021-04-22 ENCOUNTER — OFFICE VISIT (OUTPATIENT)
Dept: ONCOLOGY | Age: 36
End: 2021-04-22
Payer: COMMERCIAL

## 2021-04-22 VITALS
DIASTOLIC BLOOD PRESSURE: 72 MMHG | TEMPERATURE: 98.6 F | HEART RATE: 97 BPM | HEIGHT: 68 IN | WEIGHT: 178 LBS | SYSTOLIC BLOOD PRESSURE: 112 MMHG | BODY MASS INDEX: 26.98 KG/M2 | OXYGEN SATURATION: 97 %

## 2021-04-22 DIAGNOSIS — D50.0 IRON DEFICIENCY ANEMIA SECONDARY TO BLOOD LOSS (CHRONIC): Primary | ICD-10-CM

## 2021-04-22 PROCEDURE — 99204 OFFICE O/P NEW MOD 45 MIN: CPT | Performed by: INTERNAL MEDICINE

## 2021-04-22 RX ORDER — HYDROCORTISONE SODIUM SUCCINATE 100 MG/2ML
100 INJECTION, POWDER, FOR SOLUTION INTRAMUSCULAR; INTRAVENOUS AS NEEDED
Status: CANCELLED | OUTPATIENT
Start: 2021-05-11

## 2021-04-22 RX ORDER — DIPHENHYDRAMINE HYDROCHLORIDE 50 MG/ML
25 INJECTION, SOLUTION INTRAMUSCULAR; INTRAVENOUS AS NEEDED
Status: CANCELLED
Start: 2021-05-11

## 2021-04-22 RX ORDER — ONDANSETRON 2 MG/ML
8 INJECTION INTRAMUSCULAR; INTRAVENOUS AS NEEDED
Status: CANCELLED | OUTPATIENT
Start: 2021-05-04

## 2021-04-22 RX ORDER — DIPHENHYDRAMINE HYDROCHLORIDE 50 MG/ML
50 INJECTION, SOLUTION INTRAMUSCULAR; INTRAVENOUS AS NEEDED
Status: CANCELLED
Start: 2021-05-11

## 2021-04-22 RX ORDER — ACETAMINOPHEN 325 MG/1
650 TABLET ORAL AS NEEDED
Status: CANCELLED
Start: 2021-05-11

## 2021-04-22 RX ORDER — SODIUM CHLORIDE 9 MG/ML
25 INJECTION, SOLUTION INTRAVENOUS CONTINUOUS
Status: CANCELLED | OUTPATIENT
Start: 2021-05-04 | End: 2021-05-05

## 2021-04-22 RX ORDER — DIPHENHYDRAMINE HYDROCHLORIDE 50 MG/ML
25 INJECTION, SOLUTION INTRAMUSCULAR; INTRAVENOUS AS NEEDED
Status: CANCELLED
Start: 2021-05-04

## 2021-04-22 RX ORDER — EPINEPHRINE 1 MG/ML
0.3 INJECTION, SOLUTION, CONCENTRATE INTRAVENOUS AS NEEDED
Status: CANCELLED | OUTPATIENT
Start: 2021-05-04

## 2021-04-22 RX ORDER — ONDANSETRON 2 MG/ML
8 INJECTION INTRAMUSCULAR; INTRAVENOUS AS NEEDED
Status: CANCELLED | OUTPATIENT
Start: 2021-05-11

## 2021-04-22 RX ORDER — ACETAMINOPHEN 325 MG/1
650 TABLET ORAL AS NEEDED
Status: CANCELLED
Start: 2021-05-04

## 2021-04-22 RX ORDER — ALBUTEROL SULFATE 0.83 MG/ML
2.5 SOLUTION RESPIRATORY (INHALATION) AS NEEDED
Status: CANCELLED
Start: 2021-05-11

## 2021-04-22 RX ORDER — HYDROCORTISONE SODIUM SUCCINATE 100 MG/2ML
100 INJECTION, POWDER, FOR SOLUTION INTRAMUSCULAR; INTRAVENOUS AS NEEDED
Status: CANCELLED | OUTPATIENT
Start: 2021-05-04

## 2021-04-22 RX ORDER — EPINEPHRINE 1 MG/ML
0.3 INJECTION, SOLUTION, CONCENTRATE INTRAVENOUS AS NEEDED
Status: CANCELLED | OUTPATIENT
Start: 2021-05-11

## 2021-04-22 RX ORDER — ALBUTEROL SULFATE 0.83 MG/ML
2.5 SOLUTION RESPIRATORY (INHALATION) AS NEEDED
Status: CANCELLED
Start: 2021-05-04

## 2021-04-22 RX ORDER — SODIUM CHLORIDE 9 MG/ML
25 INJECTION, SOLUTION INTRAVENOUS CONTINUOUS
Status: CANCELLED | OUTPATIENT
Start: 2021-05-11 | End: 2021-05-12

## 2021-04-22 RX ORDER — DIPHENHYDRAMINE HYDROCHLORIDE 50 MG/ML
50 INJECTION, SOLUTION INTRAMUSCULAR; INTRAVENOUS AS NEEDED
Status: CANCELLED
Start: 2021-05-04

## 2021-04-22 NOTE — LETTER
5/2/2021 Patient: Yelena Mcleod YOB: 1985 Date of Visit: 4/22/2021 Melia Mallory NP 
[address] [place] [id number] [address] [employer] Dear Melia Mallory NP, Thank you for referring Ms. Tarik Pereyra to [address] [place] [address] for evaluation. My notes for this consultation are attached. If you have questions, please do not hesitate to call me. I look forward to following your patient along with you.  
 
 
Sincerely, 
 
Abelardo Osei MD

## 2021-04-27 NOTE — TELEPHONE ENCOUNTER
Hello, my chart will not let me send a refill request for the acetaZOLAMIDE  mg capsule( I only have 7 days of capsules left). Also my VISION has not improved at all I was wondering if I need a sooner appointment? Thank you so much for your time in advance.     Last office visit 3/31/2021  Last med refill 4/5/2021 fer sigala

## 2021-04-29 RX ORDER — ACETAZOLAMIDE 500 MG/1
500 CAPSULE, EXTENDED RELEASE ORAL EVERY 12 HOURS
Qty: 60 CAP | Refills: 5 | Status: SHIPPED | OUTPATIENT
Start: 2021-04-29

## 2021-05-03 NOTE — PROGRESS NOTES
[address] [facility] [address]  at [facility] [address], [facility], [address] [place] [address]  [phone]      Hematology Note        Patient: Cierra Hendricks MRN: [id number]  SSN: [id number]    YOB: 1985  Age: 38 y.o. Sex: female      Subjective:      Cierra Hendricks is a 38 y.o. female who I am seeing for iron deficiency anemia. She is known to have iron deficiency anemia. She suffers with menorrhagia. Routine laboratory studies shows iron deficiency anemia. She stays fatigued. She denies rectal bleeding. Review of Systems:    Constitutional: negative  Eyes: negative  Ears, Nose, Mouth, Throat, and Face: negative  Respiratory: negative  Cardiovascular: negative  Gastrointestinal: negative  Genitourinary:negative  Integument/Breast: negative  Hematologic/Lymphatic: negative  Musculoskeletal:negative  Neurological: negative      Past Medical History:   Diagnosis Date    Anemia 2019    iron deficiency anemia; stopped iron 1 year    COVID-19 virus infection 12/2020    Depression     Hemorrhoids      Past Surgical History:   Procedure Laterality Date    HX GYN  2011      Family History   Problem Relation Age of Onset    Hypertension Mother    Buren Neer Anxiety Mother     Neuropathy Mother     Elevated Lipids Mother     COPD Father     Glaucoma Father     Depression Father     Prostate Cancer Father     Hypertension Sister     No Known Problems Sister     Lupus Sister     Thyroid Disease Sister     Anemia Sister      Social History     Tobacco Use    Smoking status: Never Smoker    Smokeless tobacco: Never Used   Substance Use Topics    Alcohol use: Yes     Frequency: 2-4 times a month     Comment: few shots a week      Prior to Admission medications    Medication Sig Start Date End Date Taking?  Authorizing Provider   butalbital-acetaminophen-caff (Fioricet) -40 mg per capsule Take 1 Cap by mouth every six (6) hours as needed for Headache. 4/6/21  Yes Sam Eli MD   polyethylene glycol (MIRALAX) 17 gram packet Take 1 Packet by mouth daily. 4/5/21  Yes Olive Osullivan NP   ferrous sulfate 324 mg (65 mg iron) tablet Take 1 Tab by mouth Daily (before breakfast). 4/5/21  Yes Olive Osullivan NP   acetaminophen (TylenoL) 325 mg tablet Take 650 mg by mouth every six (6) hours as needed for Pain. Yes Provider, Historical   aspirin/salicylamide/caffeine (BC HEADACHE POWDER PO) Take  by mouth as needed. Yes Provider, Historical   multivitamin (ONE A DAY) tablet Take 1 Tab by mouth daily. Yes Provider, Historical   acetaZOLAMIDE SR (DIAMOX) 500 mg capsule Take 1 Cap by mouth every twelve (12) hours. Indications: pseudotumor cerebri, a condition with high fluid pressure in the brain 4/29/21   Ruperto Davis MD            No Known Allergies        Objective:     Vitals:    04/22/21 1126   BP: 112/72   Pulse: 97   Temp: 98.6 °F (37 °C)   TempSrc: Temporal   SpO2: 97%   Weight: 178 lb (80.7 kg)   Height: 5' 8\" (1.727 m)            Physical Exam:    GENERAL: alert, cooperative, no distress, appears stated age  EYE: conjunctivae/corneas clear. PERRL, EOM's intact  LYMPHATIC: Cervical, supraclavicular, and axillary nodes normal.   THROAT & NECK: normal and no erythema or exudates noted. LUNG: clear to auscultation bilaterally  HEART: regular rate and rhythm, S1, S2 normal, no murmur, click, rub or gallop  ABDOMEN: soft, non-tender. Bowel sounds normal. No masses,  no organomegaly  EXTREMITIES:  extremities normal, atraumatic, no cyanosis or edema  SKIN: Normal.  NEUROLOGIC: AOx3. Gait normal. Reflexes and motor strength normal and symmetric. Cranial nerves 2-12 and sensation grossly intact.           Lab Results   Component Value Date/Time    WBC 4.6 04/15/2021 08:42 AM    HGB 9.9 (L) 04/15/2021 08:42 AM    HCT 36.8 04/15/2021 08:42 AM    PLATELET 691 23/13/1015 08:42 AM    MCV 67 (L) 04/15/2021 08:42 AM           Lab Results Component Value Date/Time    Iron 18 (L) 04/15/2021 08:42 AM    TIBC 433 04/15/2021 08:42 AM    Iron % saturation 4 (LL) 04/15/2021 08:42 AM    Ferritin 26 04/15/2021 08:42 AM           Assessment:     1. Iron deficiency anemia secondary to chronic gastrointestinal bleeding:    I discussed with her various ways to replace/supplement iron which includes giving oral iron preparation such as iron sulfate or similar products or utilizing intravenous access to administer the total dose of iron. The patient was given the option to choose from various oral and intravenous iron preparation and after a prolonged discussion we have agreed to proceed with IV Iron to be administered in OPIC. I counseled the patient regarding the side effects of IV Iron infusion which includes rare instances of anaphylactic reactions etc.  After weighing the benefit and risks, the patient agreed to proceed with the treatment. Plan:       1. IV Iron in OPIC  2. Labs in 3 and 6 months  3. Return in 6 monhs      Signed by: Phil Hay MD                     May 2, 2021       CC.  Neri Marroquin NP

## 2021-05-04 ENCOUNTER — HOSPITAL ENCOUNTER (OUTPATIENT)
Dept: INFUSION THERAPY | Age: 36
Discharge: HOME | End: 2021-05-04
Payer: COMMERCIAL

## 2021-05-04 VITALS
DIASTOLIC BLOOD PRESSURE: 62 MMHG | HEART RATE: 95 BPM | RESPIRATION RATE: 16 BRPM | SYSTOLIC BLOOD PRESSURE: 108 MMHG | TEMPERATURE: 97.7 F | WEIGHT: 176.1 LBS | OXYGEN SATURATION: 100 % | BODY MASS INDEX: 26.78 KG/M2

## 2021-05-04 DIAGNOSIS — D50.8 OTHER IRON DEFICIENCY ANEMIA: Primary | ICD-10-CM

## 2021-05-04 PROCEDURE — 74011250636 HC RX REV CODE- 250/636: Performed by: INTERNAL MEDICINE

## 2021-05-04 PROCEDURE — 96365 THER/PROPH/DIAG IV INF INIT: CPT

## 2021-05-04 RX ORDER — SODIUM CHLORIDE 0.9 % (FLUSH) 0.9 %
10-40 SYRINGE (ML) INJECTION AS NEEDED
Status: DISCONTINUED | OUTPATIENT
Start: 2021-05-04 | End: 2021-05-05 | Stop reason: HOSPADM

## 2021-05-04 RX ADMIN — Medication 10 ML: at 14:10

## 2021-05-04 RX ADMIN — Medication 10 ML: at 15:15

## 2021-05-04 RX ADMIN — FERRIC CARBOXYMALTOSE INJECTION 750 MG: 50 INJECTION, SOLUTION INTRAVENOUS at 14:25

## 2021-05-04 NOTE — PROGRESS NOTES
Outpatient Infusion Center Progress Note    7781- Pt admit to [facility] for Injectafer Dose 1 in stable condition. Assessment completed. Patient denied having any symptoms of COVID-19, i.e. SOB, coughing, fever, or generally not feeling well. Also denies having been exposed to COVID-19 recently or having had any recent contact with family/friend that has a pending COVID test.     24G PIV established in [facility] with positive blood return noted. Patient Vitals for the past 12 hrs:   Temp Pulse Resp BP SpO2   05/04/21 1507  95 16 108/62    05/04/21 1358 97.7 °F (36.5 °C) 84 16 106/65 100 %     The following medications administered:  Medications Administered     ferric carboxymaltose (INJECTAFER) 750 mg in 0.9% sodium chloride 250 mL IVPB     Admin Date  05/04/2021 Action  Given Dose  750 mg Rate  750 mL/hr Route  IntraVENous Administered By  Fredrick Bosworth, RN          sodium chloride (NS) flush 10-40 mL     Admin Date  05/04/2021 Action  Given Dose  10 mL Route  IntraVENous Administered By  Fredrick Bosworth, RN           Admin Date  05/04/2021 Action  Given Dose  10 mL Route  IntraVENous Administered By  Fredrick Bosworth, MICHELLE              Pt monitored x 30 mins post infusion. Pt tolerated well, no s/s of adverse reaction noted. PIV flushed and discontinued. Site wrapped in gauze and coban. Pt provided with education on possible side effects of medication along with discharge instructions. Pt verbalized understanding. 1515- D/C home ambulatory in no distress.      Pt aware of next appointment scheduled for:     Future Appointments   Date Time Provider Janine Pritchardisti   5/6/2021 11:00 AM Angel Woods MD CDE BS AMB   5/11/2021  2:00 PM [place] INFUSION NURSE 1 [address] REG   7/6/2021  3:20 PM MD SIDNEY Dorantes BS AMB   8/11/2021  3:00 PM Maggi Walker NP [facility] BS AMB   10/29/2021  9:00 AM Katerin Edwards MD [facility] BS AMB

## 2021-05-06 ENCOUNTER — OFFICE VISIT (OUTPATIENT)
Dept: ENDOCRINOLOGY | Age: 36
End: 2021-05-06
Payer: COMMERCIAL

## 2021-05-06 ENCOUNTER — PATIENT OUTREACH (OUTPATIENT)
Dept: CASE MANAGEMENT | Age: 36
End: 2021-05-06

## 2021-05-06 VITALS
HEART RATE: 81 BPM | OXYGEN SATURATION: 100 % | TEMPERATURE: 98.2 F | HEIGHT: 68 IN | WEIGHT: 176 LBS | BODY MASS INDEX: 26.67 KG/M2

## 2021-05-06 DIAGNOSIS — R63.4 WEIGHT LOSS: ICD-10-CM

## 2021-05-06 DIAGNOSIS — E23.6 EMPTY SELLA (HCC): Primary | ICD-10-CM

## 2021-05-06 PROCEDURE — 99244 OFF/OP CNSLTJ NEW/EST MOD 40: CPT | Performed by: INTERNAL MEDICINE

## 2021-05-06 NOTE — PROGRESS NOTES
Patient has graduated from the Transitions of Care Coordination  program on 5/6/21. Patient/family has the ability to self-manage at this time Care management goals have been completed. Patient was not referred to the [facility]note 3[facility] team for further management. Goals Addressed                 This Visit's Progress     COMPLETED: Understands red flags post discharge. 04/08/21   -Patient admitted to [address] [place] [address] 4/3-4/5 with Anemia, also had a LP. -Patient to ED on 4/7 with H/A, N/V. Patient given IV fluids and caffeine.    -Patient states that she feels much better today, only has a slight H/A, no N/V, is taking PO well. Monitor for further H/A, N/V, and dizziness on next call. Arsenio Jarrell MSN, RN, CCM / Care Transition Nurse / [phone]     04/20/21   -Patient states that she is feeling well, has a slight H/A and that is relieved with Tylenol and Fioricet. Patient denies dizziness and is taking PO well. Patient has been seen by OB/GYN and having OP laser procedure on 5/19/21 at [facility] for her fibroids. Monitor her H/A and dizziness and menstrual bleeding on next call. Arsenio Jarrell MSN, RN, CCM / Care Transition Nurse / [phone]     05/06/21   Patient continues to have slight H/A, her main issue today is pressure above the eyes. Denies dizziness. Her menstrual Cycle was not too bad last time. Still has eddie at [facility] for laser procedure on 5/19/21 and also has an appt with Neuro Ophthalmology coming up. Arsenio Jarrell MSN, RN, CCM / Care Transition Nurse / [phone]                 Patient has Care Transition Nurse's contact information for any further questions, concerns, or needs.   Patients upcoming visits:    Future Appointments   Date Time Provider Janine Dalia   5/6/2021 11:00 AM MD JANELL Sutton BS AMB   [facility]11[facility]2021  [facility] [address] REG   7/6/2021  3:20 PM MD SIDNEY Harman BS AMB   8/11/2021  3:00 PM Jaycee Davey NP [facility] BS AMB   10/29/2021  9:00 AM Maximilian Stapleton MD [facility] BS AMB

## 2021-05-06 NOTE — LETTER
5/7/2021 8:47 AM    Patient:  Alfonzo Mendoza   YOB: 1985  Date of Visit: 5/6/2021      Dear   No Recipients: Thank you for referring Ms. Pieter Roe to me for evaluation/treatment. Below are the relevant portions of my assessment and plan of care. If you have questions, please do not hesitate to call me. I look forward to following Ms. Slime Higgins along with you.         Sincerely,      Renee Rogel MD

## 2021-05-06 NOTE — PROGRESS NOTES
Shila Fox MD      Patient Information  Date:5/7/2021  Name : Alfonzo Mendoza 38 y.o.     YOB: 1985         Referred by:  Neurology Dr Ra Kelly      Chief Complaint   Patient presents with   [facility] Patient     empty sella       History of Present Illness: Alfonzo Mendoza is a 38 y.o. female was referred here for evaluation management of partial empty sella which was found on MRI brain. Delsa Severance MRI March 2021Partially empty sella with mild enlargement of the optic nerve sheaths, but  without flattening of the posterior sclera. This may represent early signs of  idiopathic intracranial hypertension in the appropriate clinical context. Last pregnancy was 20 years ago, had postpartum blood loss not requiring transfusion, was lactating postpartum. She has regular menstrual cycles. Thyroid function test February 2021 TSH, free T4 normal.  Has lost weight, complains of decreased appetite, had Covid  Diagnosed with pseudotumor cerebri, on Diamox, attributes dizziness due to the medication. No exogenous glucocorticoids. Has an underlying anemia    Past Medical History:   Diagnosis Date    Anemia 2019    iron deficiency anemia; stopped iron 1 year    COVID-19 virus infection 12/2020    Depression     Hemorrhoids      Past Surgical History:   Procedure Laterality Date    HX GYN  2011     Current Outpatient Medications   Medication Sig    acetaZOLAMIDE SR (DIAMOX) 500 mg capsule Take 1 Cap by mouth every twelve (12) hours. Indications: pseudotumor cerebri, a condition with high fluid pressure in the brain    butalbital-acetaminophen-caff (Fioricet) -40 mg per capsule Take 1 Cap by mouth every six (6) hours as needed for Headache.  polyethylene glycol (MIRALAX) 17 gram packet Take 1 Packet by mouth daily.  ferrous sulfate 324 mg (65 mg iron) tablet Take 1 Tab by mouth Daily (before breakfast).     acetaminophen (TylenoL) 325 mg tablet Take 650 mg by mouth every six (6) hours as needed for Pain.  aspirin/salicylamide/caffeine (BC HEADACHE POWDER PO) Take  by mouth as needed.  multivitamin (ONE A DAY) tablet Take 1 Tab by mouth daily. No current facility-administered medications for this visit. No Known Allergies      Review of Systems:  All 10 systems reviewed and are negative other than mentioned in HPI    Physical Examination:  Pulse 81, temperature 98.2 °F (36.8 °C), height 5' 8\" (1.727 m), weight 176 lb (79.8 kg), SpO2 100 %. Body mass index is 26.76 kg/m². - General: pleasant, no distress, good eye contact  - HEENT: no pallor, no periorbital edema, EOMI  - Neck: supple, no thyromegaly, no nodules  - Cardiovascular: regular,  normal S1 and S2,   - Respiratory: clear to auscultation bilaterally  - Gastrointestinal: soft, nontender,   - Musculoskeletal: no edema  - Neurological: alert and oriented  - Psychiatric: normal mood and affect    Data Reviewed:     Lab Results   Component Value Date/Time    Hemoglobin A1c 5.7 (H) 03/24/2017 12:00 AM    Glucose 99 04/06/2021 12:48 PM    LDL, calculated 84 02/16/2018 03:45 PM    Creatinine 0.72 04/06/2021 12:48 PM      Lab Results   Component Value Date/Time    GFR est non-AA >60 04/06/2021 12:48 PM    GFR est AA >60 04/06/2021 12:48 PM    Creatinine 0.72 04/06/2021 12:48 PM    BUN 17 04/06/2021 12:48 PM    Sodium 138 04/06/2021 12:48 PM    Potassium 3.6 04/06/2021 12:48 PM    Chloride 110 (H) 04/06/2021 12:48 PM    CO2 22 04/06/2021 12:48 PM    Magnesium 2.1 04/03/2021 07:47 AM       Assessment/Plan:     1. Empty sella ([address]    2. Weight loss        Partial empty sella: She has intact pituitary gonadal axis as well as pituitary thyroid axis. Screen other anterior pituitary hormones especially adrenal axis given weight loss and nausea. .  ACTH, a.m. cortisol, prolactin, IGF-I, growth hormone, BMP  If normal no further work-up.     Intracranial hypertension: Followed by neurology        I have discussed the diagnosis with the patient and the intended plan . The patient has received an after-visit summary and questions were answered concerning future plans. I have discussed medication side effects . Thank you for allowing me to participate in the care of this patient. Drew Diggs MD      There are no Patient Instructions on file for this visit. Follow-up and Dispositions    · Return if symptoms worsen or fail to improve, for AM labs soon. Patient /caregiver verbalized understanding . Voice-recognition software was used to generate this report, which may result in some phonetic-based errors in the grammar and contents. Even though attempts were made to correct all the mistakes, some may have been missed and remained in the body of the report.

## 2021-05-06 NOTE — LETTER
5/7/2021    Patient: Yelena Mcleod   YOB: 1985   Date of Visit: 5/6/2021     Melia Mallory NP  [address] [address]  Via In Basket    Dear Melia Mallory NP,      Thank you for referring Ms. Tarik Pereyra to [id number] [place] [address] for evaluation. My notes for this consultation are attached. If you have questions, please do not hesitate to call me. I look forward to following your patient along with you.       Sincerely,    Pedro Caceres MD

## 2021-05-11 ENCOUNTER — HOSPITAL ENCOUNTER (OUTPATIENT)
Dept: INFUSION THERAPY | Age: 36
Discharge: HOME | End: 2021-05-11
Payer: COMMERCIAL

## 2021-05-11 VITALS
SYSTOLIC BLOOD PRESSURE: 95 MMHG | HEART RATE: 82 BPM | OXYGEN SATURATION: 100 % | DIASTOLIC BLOOD PRESSURE: 56 MMHG | TEMPERATURE: 98.6 F | RESPIRATION RATE: 16 BRPM

## 2021-05-11 DIAGNOSIS — D50.8 OTHER IRON DEFICIENCY ANEMIA: Primary | ICD-10-CM

## 2021-05-11 PROCEDURE — 74011250636 HC RX REV CODE- 250/636: Performed by: INTERNAL MEDICINE

## 2021-05-11 PROCEDURE — 96365 THER/PROPH/DIAG IV INF INIT: CPT

## 2021-05-11 RX ORDER — SODIUM CHLORIDE 0.9 % (FLUSH) 0.9 %
5-10 SYRINGE (ML) INJECTION AS NEEDED
Status: DISCONTINUED | OUTPATIENT
Start: 2021-05-11 | End: 2021-05-12 | Stop reason: HOSPADM

## 2021-05-11 RX ADMIN — Medication 10 ML: at 14:00

## 2021-05-11 RX ADMIN — Medication 10 ML: at 14:35

## 2021-05-11 RX ADMIN — FERRIC CARBOXYMALTOSE INJECTION 750 MG: 50 INJECTION, SOLUTION INTRAVENOUS at 14:15

## 2021-05-11 NOTE — PROGRESS NOTES
[address] arrived to [facility] ambulatory in no acute distress for Injectafer Dose 2/2. Assessment completed, no new complaints voiced. IV established in left AC without issue and positive blood return noted. Patient denied having any symptoms of COVID-19, i.e. SOB, coughing, fever, or generally not feeling well. Also denies having been exposed to COVID-19 recently or having had any recent contact with family/friend that has a pending COVID test.    The following medications administered:  Injectafer 750 mg IV over 20 mins    Patient Vitals for the past 12 hrs:   Temp Pulse Resp BP SpO2   05/11/21 1506  82 16 (!) 95/56    05/11/21 1353 98.6 °F (37 °C) 93 16 105/62 100 %       1510- Pt tolerated treatment well. Patient monitored for 30 mins post infusion and no adverse reactions noted. IV flushed per policy and removed, 2x2 and coban placed. Pt discharged ambulatory in no acute distress, accompanied by self. No further appts scheduled at this time.

## 2021-07-06 ENCOUNTER — OFFICE VISIT (OUTPATIENT)
Dept: NEUROLOGY | Age: 36
End: 2021-07-06
Payer: COMMERCIAL

## 2021-07-06 VITALS
WEIGHT: 176 LBS | HEART RATE: 83 BPM | DIASTOLIC BLOOD PRESSURE: 64 MMHG | RESPIRATION RATE: 16 BRPM | OXYGEN SATURATION: 99 % | BODY MASS INDEX: 26.67 KG/M2 | HEIGHT: 68 IN | SYSTOLIC BLOOD PRESSURE: 98 MMHG

## 2021-07-06 DIAGNOSIS — E55.9 VITAMIN D DEFICIENCY: Primary | ICD-10-CM

## 2021-07-06 DIAGNOSIS — J30.89 ENVIRONMENTAL AND SEASONAL ALLERGIES: ICD-10-CM

## 2021-07-06 PROCEDURE — 99214 OFFICE O/P EST MOD 30 MIN: CPT | Performed by: PSYCHIATRY & NEUROLOGY

## 2021-07-06 RX ORDER — ASPIRIN 325 MG
50000 TABLET, DELAYED RELEASE (ENTERIC COATED) ORAL
Qty: 12 CAPSULE | Refills: 2 | Status: SHIPPED | OUTPATIENT
Start: 2021-07-06 | End: 2022-03-07

## 2021-07-06 RX ORDER — CETIRIZINE HCL 10 MG
10 TABLET ORAL DAILY
Qty: 30 TABLET | Refills: 3 | Status: SHIPPED | OUTPATIENT
Start: 2021-07-06

## 2021-07-06 RX ORDER — DIPHENHYDRAMINE HCL 25 MG
25 CAPSULE ORAL EVERY 6 HOURS
Qty: 60 CAPSULE | Refills: 1 | Status: SHIPPED | OUTPATIENT
Start: 2021-07-06 | End: 2021-07-16

## 2021-07-06 NOTE — PROGRESS NOTES
Chief Complaint   Patient presents with    Follow-up     3 month follow up states that right now she has pressure that feels like a sinus infection and when she gets the headache.  states that the fioricet is not helping seems to make it more intensified.

## 2021-07-06 NOTE — PROGRESS NOTES
Follow-up Visit    Name Sylvester Beebe Age 38 y.o. MRN [id number]  1985       Chief Complaint: double vision    Patient continues to complain of headaches double vision left tinnitus. She has no relief and is in discomfort. Medications are innefective. She has sinus pain over her face. Tolerating acetazolamide. Assesment and Plan  1. Empty sella ([address]  - REFERRAL TO ENDOCRINOLOGY    2. Idiopathic intracranial hypertension  Spinal tap shows opening pressure of 31    3. Diplopia  Scheduled to see [facility] on the     4. Tinnitus    5. Seasonal allergies      Allergies  Patient has no known allergies. Medications  Current Outpatient Medications   Medication Sig    acetaZOLAMIDE SR (DIAMOX) 500 mg capsule Take 1 Cap by mouth every twelve (12) hours. Indications: pseudotumor cerebri, a condition with high fluid pressure in the brain    butalbital-acetaminophen-caff (Fioricet) -40 mg per capsule Take 1 Cap by mouth every six (6) hours as needed for Headache.  polyethylene glycol (MIRALAX) 17 gram packet Take 1 Packet by mouth daily.  ferrous sulfate 324 mg (65 mg iron) tablet Take 1 Tab by mouth Daily (before breakfast).  acetaminophen (TylenoL) 325 mg tablet Take 650 mg by mouth every six (6) hours as needed for Pain.  aspirin/salicylamide/caffeine (BC HEADACHE POWDER PO) Take  by mouth as needed.  multivitamin (ONE A DAY) tablet Take 1 Tab by mouth daily. No current facility-administered medications for this visit. Medical History  Past Medical History:   Diagnosis Date    Anemia     iron deficiency anemia; stopped iron 1 year    COVID-19 virus infection 2020    Depression     Hemorrhoids        Review of Systems   Constitutional: Negative for chills and fever. HENT: Negative for ear pain. Eyes: Positive for double vision. Negative for pain and discharge. Respiratory: Negative for cough and hemoptysis.     Cardiovascular: Negative for chest pain and claudication. Gastrointestinal: Negative for constipation and diarrhea. Genitourinary: Negative for flank pain and hematuria. Musculoskeletal: Negative for back pain and myalgias. Skin: Negative for itching and rash. Neurological: Positive for dizziness and headaches. Endo/Heme/Allergies: Negative for environmental allergies. Does not bruise/bleed easily. Psychiatric/Behavioral: Negative for depression and hallucinations. Exam:  Visit Vitals  BP 98/64 (BP 1 Location: Left upper arm, BP Patient Position: Sitting, BP Cuff Size: Adult)   Pulse 83   Resp 16   Ht 5' 8\" (1.727 m)   Wt 176 lb (79.8 kg)   LMP 07/03/2021   SpO2 99%   BMI 26.76 kg/m²        General: Well developed, well nourished. Patient in no apparent distress   Head: Normocephalic, atraumatic, anicteric sclera   Neck Normal ROM, No thyromegally   Lungs:  Clear to auscultation    Cardiac: Regular rate and rhythm with no murmurs. Abd: Bowel sounds were audible. Ext: No pedal edema   Skin: Supple no rash     NeurologicExam:  Mental Status: Alert and oriented to person place and time   Speech: Fluent no aphasia or dysarthria. Cranial Nerves:  II - XII Intact with the exception of diplopia and tinnitus  Funduscopic exam shows sharp discs   Motor:  Full and symmetric strength of upper and lower extremities. Normal bulk and tone. Reflexes:   Deep tendon reflexes 2+/4 and symmetric. Sensory:   Symmetric and intact with no perceived deficits . Gait:  Gait is balanced  with normal arm swing. Tremor:   No tremor noted. Cerebellar:  Coordination intact. Neurovascular: No carotid bruits.  No JVD          Lab Review  Lab Results   Component Value Date/Time    WBC 4.6 04/15/2021 08:42 AM    HCT 36.8 04/15/2021 08:42 AM    HGB 9.9 (L) 04/15/2021 08:42 AM    PLATELET 615 95/73/8379 08:42 AM       Lab Results   Component Value Date/Time    Sodium 138 04/06/2021 12:48 PM    Potassium 3.6 04/06/2021 12:48 PM    Chloride 110 (H) 04/06/2021 12:48 PM    CO2 22 04/06/2021 12:48 PM    Glucose 99 04/06/2021 12:48 PM    BUN 17 04/06/2021 12:48 PM    Creatinine 0.72 04/06/2021 12:48 PM    Calcium 9.4 04/06/2021 12:48 PM       Lab Results   Component Value Date/Time    Hemoglobin A1c 5.7 (H) 03/24/2017 12:00 AM    Hemoglobin A1c (POC) 4.9 11/27/2018 08:19 AM            Lab Results   Component Value Date/Time    Cholesterol, total 157 02/16/2018 03:45 PM    HDL Cholesterol 62 02/16/2018 03:45 PM    LDL, calculated 84 02/16/2018 03:45 PM    VLDL, calculated 11 02/16/2018 03:45 PM    Triglyceride 55 02/16/2018 03:45 PM

## 2021-08-11 ENCOUNTER — OFFICE VISIT (OUTPATIENT)
Dept: FAMILY MEDICINE CLINIC | Age: 36
End: 2021-08-11
Payer: COMMERCIAL

## 2021-08-11 VITALS
SYSTOLIC BLOOD PRESSURE: 98 MMHG | RESPIRATION RATE: 12 BRPM | HEIGHT: 68 IN | BODY MASS INDEX: 26.73 KG/M2 | OXYGEN SATURATION: 99 % | WEIGHT: 176.4 LBS | TEMPERATURE: 97.6 F | HEART RATE: 83 BPM | DIASTOLIC BLOOD PRESSURE: 60 MMHG

## 2021-08-11 DIAGNOSIS — E55.9 VITAMIN D DEFICIENCY: ICD-10-CM

## 2021-08-11 DIAGNOSIS — D50.9 IRON DEFICIENCY ANEMIA, UNSPECIFIED IRON DEFICIENCY ANEMIA TYPE: Primary | ICD-10-CM

## 2021-08-11 DIAGNOSIS — N92.0 MENORRHAGIA WITH REGULAR CYCLE: ICD-10-CM

## 2021-08-11 DIAGNOSIS — E23.6 EMPTY SELLA (HCC): ICD-10-CM

## 2021-08-11 DIAGNOSIS — G93.2 PSEUDOTUMOR CEREBRI: ICD-10-CM

## 2021-08-11 PROBLEM — D25.9 UTERINE LEIOMYOMA: Status: ACTIVE | Noted: 2021-08-11

## 2021-08-11 PROCEDURE — 99214 OFFICE O/P EST MOD 30 MIN: CPT | Performed by: NURSE PRACTITIONER

## 2021-08-11 NOTE — PROGRESS NOTES
Pili Mora (: 1985) is a 38 y.o. female, established patient, here for evaluation of the following chief complaint(s):  Anemia       ASSESSMENT/PLAN:  Below is the assessment and plan developed based on review of pertinent history, physical exam, labs, studies, and medications. 1. Iron deficiency anemia, unspecified iron deficiency anemia type  -     CBC WITH AUTOMATED DIFF; Future  -     FERRITIN; Future  -     IRON PROFILE; Future  2. Pseudotumor cerebri  -     LIPID PANEL; Future  3. Empty sella ([address]  -     GROWTH HORMONE; Future  -     METABOLIC PANEL, COMPREHENSIVE; Future  -     INSULIN-LIKE GROWTH FACTOR 1; Future  -     PROLACTIN; Future  -     ACTH; Future  -     CORTISOL, AM; Future  -     LIPID PANEL; Future  4. Menorrhagia with regular cycle  -     CBC WITH AUTOMATED DIFF; Future  -     FERRITIN; Future  -     IRON PROFILE; Future  5. Vitamin D deficiency  -     VITAMIN D, 25 HYDROXY; Future      Return in about 6 months (around 2022), or if symptoms worsen or fail to improve. SUBJECTIVE/OBJECTIVE:  HPI  Patient comes in today for follow up. Heme/onc - followed for SAYDA, has received 2 IV iron infusions 21, 21. No repeat labs yet. Has follow up in Oct 2021    Neuro - Dr. Jef Ragsdale, followed for idiopathic intracranial hypertension. Put her on zyrtec and benadryl for 1 week for allergies. Put back on vitamin D. May take tylenol every 2 weeks for headache. Dr. Jef Ragsdale had started her on topamax - did not like side effects,  transitioning back to diamox. Neuro-Ophthal - [facility]. Told not a focal issues, to wear special type of prism glasses    GYN - Dr. Burnett/Kelvin -   Had uterine leiomyoma removed, states first 2 cycles after procedure were \"awful\", hoping next cycle is better. Endo - - Dr. Jae Hurtado - followed for partial empty sella. Per endo note: She has intact pituitary gonadal axis as well as pituitary thyroid axis.   Screen other anterior pituitary hormones especially adrenal axis given weight loss and nausea. .  Ordered labs that patient has not done yet:  ACTH, a.m. cortisol, prolactin, IGF-I, growth hormone, BMP.   No Known Allergies    Past Medical History:   Diagnosis Date    Anemia 2019    iron deficiency anemia; stopped iron 1 year    COVID-19 virus infection 12/2020    Depression     Empty sella ([address] 8/11/2021    Hemorrhoids     Idiopathic intracranial hypertension 4/5/2021    Iron deficiency anemia 4/4/2017    Vitamin D deficiency 4/4/2017       Past Surgical History:   Procedure Laterality Date    HX GYN  2011       Social History     Socioeconomic History    Marital status: SINGLE     Spouse name: Not on file    Number of children: Not on file    Years of education: Not on file    Highest education level: Not on file   Occupational History    Not on file   Tobacco Use    Smoking status: Never Smoker    Smokeless tobacco: Never Used   Vaping Use    Vaping Use: Never used   Substance and Sexual Activity    Alcohol use: Yes     Comment: occ    Drug use: No    Sexual activity: Yes     Partners: Male     Birth control/protection: Surgical   Other Topics Concern    Not on file   Social History Narrative    Not on file     Social Determinants of Health     Financial Resource Strain:     Difficulty of Paying Living Expenses:    Food Insecurity:     Worried About Running Out of Food in the Last Year:     Ran Out of Food in the Last Year:    Transportation Needs:     Lack of Transportation (Medical):      Lack of Transportation (Non-Medical):    Physical Activity:     Days of Exercise per Week:     Minutes of Exercise per Session:    Stress:     Feeling of Stress :    Social Connections:     Frequency of Communication with Friends and Family:     Frequency of Social Gatherings with Friends and Family:     Attends [employer] Services:     Active Member of Clubs or Organizations:     Attends Club or Organization Meetings:     Marital Status:    Intimate Partner Violence:     Fear of Current or Ex-Partner:     Emotionally Abused:     Physically Abused:     Sexually Abused:        Family History   Problem Relation Age of Onset    Hypertension Mother     Anxiety Mother     Neuropathy Mother     Elevated Lipids Mother     COPD Father     Glaucoma Father     Depression Father     Prostate Cancer Father     Hypertension Sister     No Known Problems Sister     Lupus Sister     Thyroid Disease Sister     Anemia Sister        Current Outpatient Medications   Medication Sig    cholecalciferol (VITAMIN D3) (50,000 UNITS /1250 MCG) capsule Take 1 Capsule by mouth every seven (7) days.  cetirizine (ZYRTEC) 10 mg tablet Take 1 Tablet by mouth daily.  acetaZOLAMIDE SR (DIAMOX) 500 mg capsule Take 1 Cap by mouth every twelve (12) hours. Indications: pseudotumor cerebri, a condition with high fluid pressure in the brain    butalbital-acetaminophen-caff (Fioricet) -40 mg per capsule Take 1 Cap by mouth every six (6) hours as needed for Headache.  acetaminophen (TylenoL) 325 mg tablet Take 650 mg by mouth every six (6) hours as needed for Pain.  aspirin/salicylamide/caffeine (BC HEADACHE POWDER PO) Take  by mouth as needed.  polyethylene glycol (MIRALAX) 17 gram packet Take 1 Packet by mouth daily. (Patient not taking: Reported on 8/11/2021)    ferrous sulfate 324 mg (65 mg iron) tablet Take 1 Tab by mouth Daily (before breakfast). (Patient not taking: Reported on 8/11/2021)    multivitamin (ONE A DAY) tablet Take 1 Tab by mouth daily. (Patient not taking: Reported on 8/11/2021)     No current facility-administered medications for this visit. Review of Systems   Constitutional: Negative for chills, diaphoresis, fatigue, fever and unexpected weight change. Eyes: Positive for visual disturbance. Respiratory: Negative for cough and shortness of breath.     Cardiovascular: Negative for chest pain, palpitations and leg swelling. Gastrointestinal: Negative for abdominal pain, blood in stool, constipation, diarrhea, nausea and vomiting. Endocrine: Negative for cold intolerance and heat intolerance. Genitourinary: Positive for menstrual problem. Negative for dysuria, flank pain, frequency, hematuria and urgency. Musculoskeletal: Negative for back pain and myalgias. Skin: Negative. Neurological: Positive for dizziness and headaches. Negative for speech difficulty, light-headedness and numbness. Psychiatric/Behavioral: Negative for dysphoric mood and sleep disturbance. The patient is not nervous/anxious. Vitals:    08/11/21 1515   BP: 98/60   Pulse: 83   Resp: 12   Temp: 97.6 °F (36.4 °C)   TempSrc: Oral   SpO2: 99%   Weight: 176 lb 6.4 oz (80 kg)   Height: 5' 8\" (1.727 m)       Physical Exam  Vitals reviewed. Constitutional:       Appearance: Normal appearance. She is well-developed and well-groomed. HENT:      Right Ear: Hearing normal.      Left Ear: Hearing normal.   Neck:      Thyroid: No thyromegaly. Cardiovascular:      Rate and Rhythm: Normal rate and regular rhythm. Pulses:           Dorsalis pedis pulses are 2+ on the right side and 2+ on the left side. Heart sounds: Normal heart sounds, S1 normal and S2 normal.   Pulmonary:      Effort: Pulmonary effort is normal.      Breath sounds: Normal breath sounds. Musculoskeletal:      Right lower leg: No edema. Left lower leg: No edema. Lymphadenopathy:      Cervical: No cervical adenopathy. Skin:     General: Skin is warm and dry. Neurological:      Mental Status: She is alert and oriented to person, place, and time. Psychiatric:         Attention and Perception: Attention normal.         Mood and Affect: Mood and affect normal.         Speech: Speech normal.         Behavior: Behavior normal. Behavior is cooperative. Thought Content:  Thought content normal.         Cognition and Memory: Cognition and memory normal.       On this date 08/11/2021 I have spent 35 minutes reviewing previous notes, test results and face to face with the patient discussing the diagnosis and importance of compliance with the treatment plan as well as documenting on the day of the visit. An electronic signature was used to authenticate this note.   -- Tamara Thacker NP

## 2021-08-11 NOTE — PROGRESS NOTES
Chief Complaint   Patient presents with    Anemia     1. Have you been to the ER, urgent care clinic since your last visit? Hospitalized since your last visit? No    2. Have you seen or consulted any other health care providers outside of the [address] [place] [address] since your last visit? Include any pap smears or colon screening. Yes, fibroid removal at [address] doctor [facility] and [facility].      [facility] Due   Topic Date Due    Hepatitis C Screening  Never done    COVID-19 Vaccine (1) Never done    A1C test (Diabetic or Prediabetic)  11/27/2019    PAP AKA CERVICAL CYTOLOGY  03/29/2020

## 2021-08-18 ENCOUNTER — CLINICAL SUPPORT (OUTPATIENT)
Dept: FAMILY MEDICINE CLINIC | Age: 36
End: 2021-08-18

## 2021-08-18 DIAGNOSIS — Z01.89 ROUTINE LAB DRAW: Primary | ICD-10-CM

## 2021-08-18 NOTE — PROGRESS NOTES
Jones Fletcher (: 1985) is a 38 y.o. female, established patient, here for evaluation of the following chief complaint(s):  Labs Only     LAB ONLY, ORDERS ON 21 VISIT    An electronic signature was used to authenticate this note.   -- Imer Sheth NP

## 2021-08-20 LAB
25(OH)D3+25(OH)D2 SERPL-MCNC: 40.8 NG/ML (ref 30–100)
ACTH PLAS-MCNC: 16 PG/ML (ref 7.2–63.3)
ALBUMIN SERPL-MCNC: 4 G/DL (ref 3.8–4.8)
ALBUMIN/GLOB SERPL: 1.3 {RATIO} (ref 1.2–2.2)
ALP SERPL-CCNC: 71 IU/L (ref 48–121)
ALT SERPL-CCNC: 22 IU/L (ref 0–32)
AST SERPL-CCNC: 19 IU/L (ref 0–40)
BASOPHILS # BLD AUTO: 0.1 X10E3/UL (ref 0–0.2)
BASOPHILS NFR BLD AUTO: 1 %
BILIRUB SERPL-MCNC: 0.3 MG/DL (ref 0–1.2)
BUN SERPL-MCNC: 10 MG/DL (ref 6–20)
BUN/CREAT SERPL: 16 (ref 9–23)
CALCIUM SERPL-MCNC: 8.9 MG/DL (ref 8.7–10.2)
CHLORIDE SERPL-SCNC: 103 MMOL/L (ref 96–106)
CHOLEST SERPL-MCNC: 174 MG/DL (ref 100–199)
CO2 SERPL-SCNC: 23 MMOL/L (ref 20–29)
CORTIS AM PEAK SERPL-MCNC: 9.9 UG/DL (ref 6.2–19.4)
CREAT SERPL-MCNC: 0.64 MG/DL (ref 0.57–1)
EOSINOPHIL # BLD AUTO: 0.2 X10E3/UL (ref 0–0.4)
EOSINOPHIL NFR BLD AUTO: 5 %
ERYTHROCYTE [DISTWIDTH] IN BLOOD BY AUTOMATED COUNT: 12.6 % (ref 11.7–15.4)
FERRITIN SERPL-MCNC: 19 NG/ML (ref 15–150)
GH SERPL-MCNC: 0.3 NG/ML (ref 0–10)
GLOBULIN SER CALC-MCNC: 3.2 G/DL (ref 1.5–4.5)
GLUCOSE SERPL-MCNC: 77 MG/DL (ref 65–99)
HCT VFR BLD AUTO: 42 % (ref 34–46.6)
HDLC SERPL-MCNC: 67 MG/DL
HGB BLD-MCNC: 13.3 G/DL (ref 11.1–15.9)
IGF-I SERPL-MCNC: 216 NG/ML (ref 79–259)
IMM GRANULOCYTES # BLD AUTO: 0 X10E3/UL (ref 0–0.1)
IMM GRANULOCYTES NFR BLD AUTO: 0 %
IMP & REVIEW OF LAB RESULTS: NORMAL
IRON SATN MFR SERPL: 15 % (ref 15–55)
IRON SERPL-MCNC: 53 UG/DL (ref 27–159)
LDLC SERPL CALC-MCNC: 98 MG/DL (ref 0–99)
LYMPHOCYTES # BLD AUTO: 1.4 X10E3/UL (ref 0.7–3.1)
LYMPHOCYTES NFR BLD AUTO: 30 %
MCH RBC QN AUTO: 27.4 PG (ref 26.6–33)
MCHC RBC AUTO-ENTMCNC: 31.7 G/DL (ref 31.5–35.7)
MCV RBC AUTO: 86 FL (ref 79–97)
MONOCYTES # BLD AUTO: 0.3 X10E3/UL (ref 0.1–0.9)
MONOCYTES NFR BLD AUTO: 6 %
NEUTROPHILS # BLD AUTO: 2.7 X10E3/UL (ref 1.4–7)
NEUTROPHILS NFR BLD AUTO: 58 %
PLATELET # BLD AUTO: 285 X10E3/UL (ref 150–450)
POTASSIUM SERPL-SCNC: 4.6 MMOL/L (ref 3.5–5.2)
PROLACTIN SERPL-MCNC: 12.1 NG/ML (ref 4.8–23.3)
PROT SERPL-MCNC: 7.2 G/DL (ref 6–8.5)
RBC # BLD AUTO: 4.86 X10E6/UL (ref 3.77–5.28)
SODIUM SERPL-SCNC: 139 MMOL/L (ref 134–144)
SPECIMEN STATUS REPORT, ROLRST: NORMAL
TIBC SERPL-MCNC: 345 UG/DL (ref 250–450)
TRIGL SERPL-MCNC: 45 MG/DL (ref 0–149)
UIBC SERPL-MCNC: 292 UG/DL (ref 131–425)
VLDLC SERPL CALC-MCNC: 9 MG/DL (ref 5–40)
WBC # BLD AUTO: 4.6 X10E3/UL (ref 3.4–10.8)

## 2022-03-07 ENCOUNTER — VIRTUAL VISIT (OUTPATIENT)
Dept: FAMILY MEDICINE CLINIC | Age: 37
End: 2022-03-07
Payer: COMMERCIAL

## 2022-03-07 DIAGNOSIS — R68.81 EARLY SATIETY: ICD-10-CM

## 2022-03-07 DIAGNOSIS — E23.6 EMPTY SELLA (HCC): ICD-10-CM

## 2022-03-07 DIAGNOSIS — R63.0 DECREASED APPETITE: ICD-10-CM

## 2022-03-07 DIAGNOSIS — E55.9 VITAMIN D DEFICIENCY: ICD-10-CM

## 2022-03-07 DIAGNOSIS — D50.9 IRON DEFICIENCY ANEMIA, UNSPECIFIED IRON DEFICIENCY ANEMIA TYPE: Primary | ICD-10-CM

## 2022-03-07 DIAGNOSIS — G93.2 PSEUDOTUMOR CEREBRI: ICD-10-CM

## 2022-03-07 PROCEDURE — 99214 OFFICE O/P EST MOD 30 MIN: CPT | Performed by: NURSE PRACTITIONER

## 2022-03-07 RX ORDER — CHOLECALCIFEROL (VITAMIN D3) 125 MCG
5000 CAPSULE ORAL DAILY
Qty: 90 CAPSULE | Refills: 3 | Status: SHIPPED | OUTPATIENT
Start: 2022-03-07

## 2022-03-07 NOTE — LETTER
3/7/2022 8:41 AM    Dear Elie Camara MD    Please fax us the most recent PAP smear so that we may update the patient's records for continuity of care.      Our fax number: [phone]    Patient:   Volodymyr Dec  1985                        Sincerely,      Tracie Burrell NP

## 2022-03-07 NOTE — PROGRESS NOTES
Bianka Lopez (: 1985) is a 43 y.o. female, established patient, here for evaluation of the following chief complaint(s):   Anemia       ASSESSMENT/PLAN:  Below is the assessment and plan developed based on review of pertinent history, labs, studies, and medications. 1. Iron deficiency anemia, unspecified iron deficiency anemia type  -     CBC WITH AUTOMATED DIFF; Future  -     METABOLIC PANEL, COMPREHENSIVE; Future  -     IRON PROFILE; Future  -     FERRITIN; Future  2. Empty sella ([address]  -     CBC WITH AUTOMATED DIFF; Future  -     METABOLIC PANEL, COMPREHENSIVE; Future  3. Pseudotumor cerebri  -     CBC WITH AUTOMATED DIFF; Future  -     METABOLIC PANEL, COMPREHENSIVE; Future  4. Vitamin D deficiency  -     cholecalciferol (VITAMIN D3) (5000 Units /125 mcg) capsule; Take 1 Capsule by mouth daily. , Normal, Disp-90 Capsule, R-3  -     VITAMIN D, 25 HYDROXY; Future  5. Decreased appetite  -     REFERRAL TO GASTROENTEROLOGY  6. Early satiety  -     REFERRAL TO GASTROENTEROLOGY      SUBJECTIVE/OBJECTIVE:  HPI   Heme/onc - followed for SAYDA, received 2 IV iron infusions 21, 21. last hemoglobin/iron levels normal in Aug 2021    Headaches increased about 3 weeks ago. Needs to reschedule with neurology since Dr. Marlene Blackwood left practice. If she gets too much rest, if she gets too little rest - headaches will increase. Appetite fair, weight is stable. Previously seen Dr. Marlene Blackwood, followed for idiopathic intracranial hypertension. taking Diamox    Neuro-Ophthal - [facility]. Told not a focal issues, to wear special type of prism glasses     Endo - - Dr. Elodia John - followed for partial empty sella. Per endo note: She has intact pituitary gonadal axis as well as pituitary thyroid axis. Screen other anterior pituitary hormones especially adrenal axis given weight loss and nausea.   No Known Allergies    Past Medical History:   Diagnosis Date    Anemia 2019    iron deficiency anemia; stopped iron 1 year  COVID-19 virus infection 12/2020    Depression     Empty sella ([address] 8/11/2021    Hemorrhoids     Idiopathic intracranial hypertension 4/5/2021    Iron deficiency anemia 4/4/2017    Vitamin D deficiency 4/4/2017       Past Surgical History:   Procedure Laterality Date    HX GYN  2011       Social History     Socioeconomic History    Marital status: SINGLE     Spouse name: Not on file    Number of children: Not on file    Years of education: Not on file    Highest education level: Not on file   Occupational History    Not on file   Tobacco Use    Smoking status: Never Smoker    Smokeless tobacco: Never Used   Vaping Use    Vaping Use: Never used   Substance and Sexual Activity    Alcohol use: Yes     Comment: occ    Drug use: No    Sexual activity: Yes     Partners: Male     Birth control/protection: Surgical   Other Topics Concern    Not on file   Social History Narrative    Not on file     Social Determinants of Health     Financial Resource Strain:     Difficulty of Paying Living Expenses: Not on file   Food Insecurity:     Worried About Running Out of Food in the Last Year: Not on file    [place] of Food in the Last Year: Not on file   Transportation Needs:     Lack of Transportation (Medical): Not on file    Lack of Transportation (Non-Medical):  Not on file   Physical Activity:     Days of Exercise per Week: Not on file    Minutes of Exercise per Session: Not on file   Stress:     Feeling of Stress : Not on file   Social Connections:     Frequency of Communication with Friends and Family: Not on file    Frequency of Social Gatherings with Friends and Family: Not on file    Attends Confucianism Services: Not on file    Active Member of Clubs or Organizations: Not on file    Attends Club or Organization Meetings: Not on file    Marital Status: Not on file   Intimate Partner Violence:     Fear of Current or Ex-Partner: Not on file    Emotionally Abused: Not on file   [address] [place] [address] Physically Abused: Not on file    Sexually Abused: Not on file   Housing Stability:     Unable to Pay for Housing in the Last Year: Not on file    Number of Places Lived in the Last Year: Not on file    Unstable Housing in the Last Year: Not on file       Family History   Problem Relation Age of Onset    Hypertension Mother    [place] Anxiety Mother     Neuropathy Mother     Elevated Lipids Mother     COPD Father     Glaucoma Father     Depression Father     Prostate Cancer Father     Hypertension Sister     No Known Problems Sister     Lupus Sister     Thyroid Disease Sister     Anemia Sister        Current Outpatient Medications   Medication Sig    cholecalciferol (VITAMIN D3) (5000 Units /125 mcg) capsule Take 1 Capsule by mouth daily.  cetirizine (ZYRTEC) 10 mg tablet Take 1 Tablet by mouth daily.  acetaZOLAMIDE SR (DIAMOX) 500 mg capsule Take 1 Cap by mouth every twelve (12) hours. Indications: pseudotumor cerebri, a condition with high fluid pressure in the brain    butalbital-acetaminophen-caff (Fioricet) -40 mg per capsule Take 1 Cap by mouth every six (6) hours as needed for Headache.  acetaminophen (TylenoL) 325 mg tablet Take 650 mg by mouth every six (6) hours as needed for Pain.  aspirin/salicylamide/caffeine (BC HEADACHE POWDER PO) Take  by mouth as needed.  multivitamin (ONE A DAY) tablet Take 1 Tab by mouth daily. (Patient not taking: Reported on 8/11/2021)     No current facility-administered medications for this visit. Review of Systems   Constitutional: Positive for appetite change and unexpected weight change. Negative for chills, diaphoresis, fatigue and fever. Respiratory: Negative for cough and shortness of breath. Cardiovascular: Negative for chest pain, palpitations and leg swelling. Gastrointestinal: Positive for nausea. Negative for abdominal pain, blood in stool, constipation, diarrhea and vomiting.         Weight loss, early satiety Genitourinary: Negative. Musculoskeletal: Negative for back pain and myalgias. Skin: Negative. Neurological: Positive for headaches. Negative for dizziness, speech difficulty, light-headedness and numbness. Psychiatric/Behavioral: Negative for dysphoric mood and sleep disturbance. The patient is not nervous/anxious. No data recorded     Physical Exam  Constitutional: [x] Appears well-developed and well-nourished [x] No apparent distress      Mental status: [x] Alert and awake  [x] Oriented to person/place/time [x] Able to follow commands      Neck: [x] No visualized mass    Pulmonary/Chest: [x] Respiratory effort normal   [x] No visualized signs of difficulty breathing or respiratory distresS     Musculoskeletal:   [x] Normal range of motion of neck    Neurological:        [x] No Facial Asymmetry (Cranial nerve 7 motor function) (limited exam due to video visit)           Skin:        [x] No significant exanthematous lesions or discoloration noted on facial skin            Psychiatric:       [x] Normal Affect       [x] No Hallucinations    On this date 03/07/2022 I have spent 30 minutes reviewing previous notes, test results and face to face (virtual) with the patient discussing the diagnosis and importance of compliance with the treatment plan as well as documenting on the day of the visit. Shelley Oliva, was evaluated through a synchronous (real-time) audio-video encounter. The patient (or guardian if applicable) is aware that this is a billable service, which includes applicable co-pays. Verbal consent to proceed has been obtained. The visit was conducted pursuant to the emergency declaration under the [address] [place] [address] authority and the [employer] and [employer] General Act. Patient identification was verified, and a caregiver was present when appropriate.  The patient was located at home in a state where the provider was licensed to provide care. An electronic signature was used to authenticate this note.   -- Shelbie Ocampo NP

## 2022-03-07 NOTE — PROGRESS NOTES
Chief Complaint   Patient presents with    Anemia       1. \"Have you been to the ER, urgent care clinic since your last visit? Hospitalized since your last visit? \" No    2. \"Have you seen or consulted any other health care providers outside of the [address] [place] [address] since your last visit? \" No     3. For patients aged 39-70: Has the patient had a colonoscopy / FIT/ Cologuard? NA - based on age      If the patient is female:    4. For patients aged 41-77: Has the patient had a mammogram within the past 2 years? NA - based on age or sex      11. For patients aged 21-65: Has the patient had a pap smear? Yes - Care Gap present. Rooming MA/LPN to request most recent results PAP - Kike Reilly, Medical Release Signed.         Health Maintenance Due   Topic Date Due    Hepatitis C Screening  Never done    A1C test (Diabetic or Prediabetic)  11/27/2019    Flu Vaccine (1) 09/01/2021    COVID-19 Vaccine (3 - Booster for Pfizer series) 03/02/2022    Cervical cancer screen  03/29/2022

## 2022-03-15 ENCOUNTER — CLINICAL SUPPORT (OUTPATIENT)
Dept: FAMILY MEDICINE CLINIC | Age: 37
End: 2022-03-15

## 2022-03-15 VITALS — HEIGHT: 68 IN | WEIGHT: 183.2 LBS | BODY MASS INDEX: 27.77 KG/M2

## 2022-03-15 DIAGNOSIS — Z01.89 ROUTINE LAB DRAW: Primary | ICD-10-CM

## 2022-03-15 NOTE — PROGRESS NOTES
Jose Freeman (: 1985) is a 43 y.o. female, established patient, here for evaluation of the following chief complaint(s):  Labs Only       ASSESSMENT/PLAN:  Below is the assessment and plan developed based on review of pertinent history, physical exam, labs, studies, and medications. 1. Routine lab draw  lab orders on 3/7/22 visit  Wt today 183 lb    Weight Metrics 3/15/2022 2021 2021 2021 2021 2021 2021   Weight 183 lb 3.2 oz 176 lb 6.4 oz 176 lb 176 lb 176 lb 1.6 oz 178 lb 175 lb 12.8 oz   BMI 27.86 kg/m2 26.82 kg/m2 26.76 kg/m2 26.76 kg/m2 26.78 kg/m2 27.06 kg/m2 26.73 kg/m2       An electronic signature was used to authenticate this note.   -- Kaur Costa NP

## 2022-03-16 LAB
25(OH)D3+25(OH)D2 SERPL-MCNC: 37.8 NG/ML (ref 30–100)
ALBUMIN SERPL-MCNC: 4.6 G/DL (ref 3.8–4.8)
ALBUMIN/GLOB SERPL: 1.2 {RATIO} (ref 1.2–2.2)
ALP SERPL-CCNC: 59 IU/L (ref 44–121)
ALT SERPL-CCNC: 32 IU/L (ref 0–32)
AST SERPL-CCNC: 19 IU/L (ref 0–40)
BASOPHILS # BLD AUTO: 0.1 X10E3/UL (ref 0–0.2)
BASOPHILS NFR BLD AUTO: 1 %
BILIRUB SERPL-MCNC: 0.3 MG/DL (ref 0–1.2)
BUN SERPL-MCNC: 12 MG/DL (ref 6–20)
BUN/CREAT SERPL: 17 (ref 9–23)
CALCIUM SERPL-MCNC: 9.6 MG/DL (ref 8.7–10.2)
CHLORIDE SERPL-SCNC: 100 MMOL/L (ref 96–106)
CO2 SERPL-SCNC: 24 MMOL/L (ref 20–29)
CREAT SERPL-MCNC: 0.71 MG/DL (ref 0.57–1)
EGFR: 112 ML/MIN/1.73
EOSINOPHIL # BLD AUTO: 0.2 X10E3/UL (ref 0–0.4)
EOSINOPHIL NFR BLD AUTO: 4 %
ERYTHROCYTE [DISTWIDTH] IN BLOOD BY AUTOMATED COUNT: 15.9 % (ref 11.7–15.4)
FERRITIN SERPL-MCNC: 5 NG/ML (ref 15–150)
GLOBULIN SER CALC-MCNC: 3.7 G/DL (ref 1.5–4.5)
GLUCOSE SERPL-MCNC: 69 MG/DL (ref 65–99)
HCT VFR BLD AUTO: 39.3 % (ref 34–46.6)
HGB BLD-MCNC: 11.4 G/DL (ref 11.1–15.9)
IMM GRANULOCYTES # BLD AUTO: 0 X10E3/UL (ref 0–0.1)
IMM GRANULOCYTES NFR BLD AUTO: 0 %
INTERPRETATION: NORMAL
IRON SATN MFR SERPL: 13 % (ref 15–55)
IRON SERPL-MCNC: 62 UG/DL (ref 27–159)
LYMPHOCYTES # BLD AUTO: 1.4 X10E3/UL (ref 0.7–3.1)
LYMPHOCYTES NFR BLD AUTO: 31 %
MCH RBC QN AUTO: 21.6 PG (ref 26.6–33)
MCHC RBC AUTO-ENTMCNC: 29 G/DL (ref 31.5–35.7)
MCV RBC AUTO: 74 FL (ref 79–97)
MONOCYTES # BLD AUTO: 0.4 X10E3/UL (ref 0.1–0.9)
MONOCYTES NFR BLD AUTO: 8 %
NEUTROPHILS # BLD AUTO: 2.5 X10E3/UL (ref 1.4–7)
NEUTROPHILS NFR BLD AUTO: 56 %
PLATELET # BLD AUTO: 355 X10E3/UL (ref 150–450)
POTASSIUM SERPL-SCNC: 4.5 MMOL/L (ref 3.5–5.2)
PROT SERPL-MCNC: 8.3 G/DL (ref 6–8.5)
RBC # BLD AUTO: 5.28 X10E6/UL (ref 3.77–5.28)
SODIUM SERPL-SCNC: 139 MMOL/L (ref 134–144)
TIBC SERPL-MCNC: 473 UG/DL (ref 250–450)
UIBC SERPL-MCNC: 411 UG/DL (ref 131–425)
WBC # BLD AUTO: 4.6 X10E3/UL (ref 3.4–10.8)

## 2022-03-18 PROBLEM — D64.9 SYMPTOMATIC ANEMIA: Status: ACTIVE | Noted: 2021-04-03

## 2022-03-18 PROBLEM — E23.6 EMPTY SELLA (HCC): Status: ACTIVE | Noted: 2021-08-11

## 2022-03-18 PROBLEM — N92.0 MENORRHAGIA WITH REGULAR CYCLE: Status: ACTIVE | Noted: 2021-08-11

## 2022-03-19 PROBLEM — R42 ORTHOSTATIC DIZZINESS: Status: ACTIVE | Noted: 2021-04-03

## 2022-03-19 PROBLEM — G93.2 IDIOPATHIC INTRACRANIAL HYPERTENSION: Status: ACTIVE | Noted: 2021-04-05

## 2022-03-19 PROBLEM — D25.9 UTERINE LEIOMYOMA: Status: ACTIVE | Noted: 2021-08-11

## 2022-03-19 PROBLEM — H53.2 DIPLOPIA: Status: ACTIVE | Noted: 2021-04-05

## 2022-03-19 PROBLEM — R73.03 PREDIABETES: Status: ACTIVE | Noted: 2017-04-04

## 2022-03-19 PROBLEM — E55.9 VITAMIN D DEFICIENCY: Status: ACTIVE | Noted: 2017-04-04

## 2022-03-19 PROBLEM — E66.9 OBESITY (BMI 30-39.9): Status: ACTIVE | Noted: 2017-02-14

## 2022-03-20 PROBLEM — F41.8 DEPRESSION WITH ANXIETY: Status: ACTIVE | Noted: 2017-01-16

## 2022-03-20 PROBLEM — D50.9 IRON DEFICIENCY ANEMIA: Status: ACTIVE | Noted: 2017-04-04

## 2022-03-21 NOTE — PROGRESS NOTES
Iron profile and ferritin are still a little low. Can schedule with hematology for iron infusion if needed. Also see gastroenterology as planned.     Liver and kidney function normal.  Vitamin D normal.

## 2022-05-26 ENCOUNTER — VIRTUAL VISIT (OUTPATIENT)
Dept: ONCOLOGY | Age: 37
End: 2022-05-26
Payer: COMMERCIAL

## 2022-05-26 DIAGNOSIS — D50.0 IRON DEFICIENCY ANEMIA SECONDARY TO BLOOD LOSS (CHRONIC): Primary | ICD-10-CM

## 2022-05-26 PROCEDURE — 99214 OFFICE O/P EST MOD 30 MIN: CPT | Performed by: INTERNAL MEDICINE

## 2022-05-26 RX ORDER — DIPHENHYDRAMINE HYDROCHLORIDE 50 MG/ML
50 INJECTION, SOLUTION INTRAMUSCULAR; INTRAVENOUS AS NEEDED
Start: 2022-06-22

## 2022-05-26 RX ORDER — DIPHENHYDRAMINE HYDROCHLORIDE 50 MG/ML
50 INJECTION, SOLUTION INTRAMUSCULAR; INTRAVENOUS AS NEEDED
Start: 2022-07-20

## 2022-05-26 RX ORDER — HEPARIN 100 UNIT/ML
300-500 SYRINGE INTRAVENOUS AS NEEDED
Start: 2022-07-20

## 2022-05-26 RX ORDER — ALBUTEROL SULFATE 0.83 MG/ML
2.5 SOLUTION RESPIRATORY (INHALATION) AS NEEDED
Start: 2022-07-20

## 2022-05-26 RX ORDER — SODIUM CHLORIDE 0.9 % (FLUSH) 0.9 %
10 SYRINGE (ML) INJECTION AS NEEDED
OUTPATIENT
Start: 2022-07-20

## 2022-05-26 RX ORDER — SODIUM CHLORIDE 9 MG/ML
10 INJECTION INTRAMUSCULAR; INTRAVENOUS; SUBCUTANEOUS AS NEEDED
OUTPATIENT
Start: 2022-07-20

## 2022-05-26 RX ORDER — SODIUM CHLORIDE 9 MG/ML
10 INJECTION INTRAMUSCULAR; INTRAVENOUS; SUBCUTANEOUS AS NEEDED
OUTPATIENT
Start: 2022-06-22

## 2022-05-26 RX ORDER — DIPHENHYDRAMINE HYDROCHLORIDE 50 MG/ML
25 INJECTION, SOLUTION INTRAMUSCULAR; INTRAVENOUS AS NEEDED
Start: 2022-06-22

## 2022-05-26 RX ORDER — ONDANSETRON 2 MG/ML
8 INJECTION INTRAMUSCULAR; INTRAVENOUS AS NEEDED
OUTPATIENT
Start: 2022-07-06

## 2022-05-26 RX ORDER — HEPARIN 100 UNIT/ML
300-500 SYRINGE INTRAVENOUS AS NEEDED
Start: 2022-06-29

## 2022-05-26 RX ORDER — ACETAMINOPHEN 325 MG/1
650 TABLET ORAL ONCE
Status: CANCELLED
Start: 2022-07-08 | End: 2022-07-08

## 2022-05-26 RX ORDER — ONDANSETRON 2 MG/ML
8 INJECTION INTRAMUSCULAR; INTRAVENOUS AS NEEDED
OUTPATIENT
Start: 2022-06-22

## 2022-05-26 RX ORDER — SODIUM CHLORIDE 0.9 % (FLUSH) 0.9 %
10 SYRINGE (ML) INJECTION AS NEEDED
OUTPATIENT
Start: 2022-07-06

## 2022-05-26 RX ORDER — SODIUM CHLORIDE 9 MG/ML
10 INJECTION INTRAMUSCULAR; INTRAVENOUS; SUBCUTANEOUS AS NEEDED
OUTPATIENT
Start: 2022-07-13

## 2022-05-26 RX ORDER — DIPHENHYDRAMINE HYDROCHLORIDE 50 MG/ML
25 INJECTION, SOLUTION INTRAMUSCULAR; INTRAVENOUS AS NEEDED
Start: 2022-06-29

## 2022-05-26 RX ORDER — HEPARIN 100 UNIT/ML
300-500 SYRINGE INTRAVENOUS AS NEEDED
Start: 2022-06-22

## 2022-05-26 RX ORDER — EPINEPHRINE 1 MG/ML
0.3 INJECTION, SOLUTION, CONCENTRATE INTRAVENOUS AS NEEDED
OUTPATIENT
Start: 2022-07-20

## 2022-05-26 RX ORDER — DIPHENHYDRAMINE HYDROCHLORIDE 50 MG/ML
50 INJECTION, SOLUTION INTRAMUSCULAR; INTRAVENOUS ONCE
Status: CANCELLED
Start: 2022-07-01 | End: 2022-07-01

## 2022-05-26 RX ORDER — SODIUM CHLORIDE 9 MG/ML
25 INJECTION, SOLUTION INTRAVENOUS CONTINUOUS
Status: CANCELLED | OUTPATIENT
Start: 2022-06-17

## 2022-05-26 RX ORDER — ALBUTEROL SULFATE 0.83 MG/ML
2.5 SOLUTION RESPIRATORY (INHALATION) AS NEEDED
Start: 2022-07-06

## 2022-05-26 RX ORDER — EPINEPHRINE 1 MG/ML
0.3 INJECTION, SOLUTION, CONCENTRATE INTRAVENOUS AS NEEDED
OUTPATIENT
Start: 2022-07-06

## 2022-05-26 RX ORDER — DIPHENHYDRAMINE HYDROCHLORIDE 50 MG/ML
50 INJECTION, SOLUTION INTRAMUSCULAR; INTRAVENOUS ONCE
Status: CANCELLED
Start: 2022-06-24 | End: 2022-06-24

## 2022-05-26 RX ORDER — DIPHENHYDRAMINE HYDROCHLORIDE 50 MG/ML
50 INJECTION, SOLUTION INTRAMUSCULAR; INTRAVENOUS AS NEEDED
Start: 2022-07-13

## 2022-05-26 RX ORDER — HYDROCORTISONE SODIUM SUCCINATE 100 MG/2ML
100 INJECTION, POWDER, FOR SOLUTION INTRAMUSCULAR; INTRAVENOUS AS NEEDED
OUTPATIENT
Start: 2022-06-22

## 2022-05-26 RX ORDER — SODIUM CHLORIDE 0.9 % (FLUSH) 0.9 %
10 SYRINGE (ML) INJECTION AS NEEDED
OUTPATIENT
Start: 2022-06-22

## 2022-05-26 RX ORDER — ACETAMINOPHEN 325 MG/1
650 TABLET ORAL AS NEEDED
Start: 2022-06-22

## 2022-05-26 RX ORDER — DIPHENHYDRAMINE HYDROCHLORIDE 50 MG/ML
50 INJECTION, SOLUTION INTRAMUSCULAR; INTRAVENOUS AS NEEDED
Start: 2022-06-29

## 2022-05-26 RX ORDER — ONDANSETRON 2 MG/ML
8 INJECTION INTRAMUSCULAR; INTRAVENOUS AS NEEDED
OUTPATIENT
Start: 2022-06-29

## 2022-05-26 RX ORDER — ACETAMINOPHEN 325 MG/1
650 TABLET ORAL ONCE
Status: CANCELLED
Start: 2022-06-24 | End: 2022-06-24

## 2022-05-26 RX ORDER — ACETAMINOPHEN 325 MG/1
650 TABLET ORAL AS NEEDED
Start: 2022-07-20

## 2022-05-26 RX ORDER — ACETAMINOPHEN 325 MG/1
650 TABLET ORAL ONCE
Status: CANCELLED
Start: 2022-07-01 | End: 2022-07-01

## 2022-05-26 RX ORDER — ACETAMINOPHEN 325 MG/1
650 TABLET ORAL ONCE
Status: CANCELLED
Start: 2022-06-10 | End: 2022-06-10

## 2022-05-26 RX ORDER — HEPARIN 100 UNIT/ML
300-500 SYRINGE INTRAVENOUS AS NEEDED
Start: 2022-07-13

## 2022-05-26 RX ORDER — EPINEPHRINE 1 MG/ML
0.3 INJECTION, SOLUTION, CONCENTRATE INTRAVENOUS AS NEEDED
OUTPATIENT
Start: 2022-06-29

## 2022-05-26 RX ORDER — SODIUM CHLORIDE 9 MG/ML
25 INJECTION, SOLUTION INTRAVENOUS CONTINUOUS
Status: CANCELLED | OUTPATIENT
Start: 2022-06-10

## 2022-05-26 RX ORDER — EPINEPHRINE 1 MG/ML
0.3 INJECTION, SOLUTION, CONCENTRATE INTRAVENOUS AS NEEDED
OUTPATIENT
Start: 2022-06-22

## 2022-05-26 RX ORDER — ONDANSETRON 2 MG/ML
8 INJECTION INTRAMUSCULAR; INTRAVENOUS AS NEEDED
OUTPATIENT
Start: 2022-07-20

## 2022-05-26 RX ORDER — ACETAMINOPHEN 325 MG/1
650 TABLET ORAL AS NEEDED
Start: 2022-07-06

## 2022-05-26 RX ORDER — DIPHENHYDRAMINE HYDROCHLORIDE 50 MG/ML
50 INJECTION, SOLUTION INTRAMUSCULAR; INTRAVENOUS AS NEEDED
Start: 2022-07-06

## 2022-05-26 RX ORDER — ACETAMINOPHEN 325 MG/1
650 TABLET ORAL AS NEEDED
Start: 2022-06-29

## 2022-05-26 RX ORDER — SODIUM CHLORIDE 9 MG/ML
25 INJECTION, SOLUTION INTRAVENOUS CONTINUOUS
Status: CANCELLED | OUTPATIENT
Start: 2022-07-08

## 2022-05-26 RX ORDER — DIPHENHYDRAMINE HYDROCHLORIDE 50 MG/ML
25 INJECTION, SOLUTION INTRAMUSCULAR; INTRAVENOUS AS NEEDED
Start: 2022-07-13

## 2022-05-26 RX ORDER — DIPHENHYDRAMINE HYDROCHLORIDE 50 MG/ML
50 INJECTION, SOLUTION INTRAMUSCULAR; INTRAVENOUS ONCE
Status: CANCELLED
Start: 2022-06-17 | End: 2022-06-17

## 2022-05-26 RX ORDER — DIPHENHYDRAMINE HYDROCHLORIDE 50 MG/ML
50 INJECTION, SOLUTION INTRAMUSCULAR; INTRAVENOUS ONCE
Status: CANCELLED
Start: 2022-07-08 | End: 2022-07-08

## 2022-05-26 RX ORDER — DIPHENHYDRAMINE HYDROCHLORIDE 50 MG/ML
25 INJECTION, SOLUTION INTRAMUSCULAR; INTRAVENOUS AS NEEDED
Start: 2022-07-06

## 2022-05-26 RX ORDER — SODIUM CHLORIDE 0.9 % (FLUSH) 0.9 %
10 SYRINGE (ML) INJECTION AS NEEDED
OUTPATIENT
Start: 2022-06-29

## 2022-05-26 RX ORDER — ALBUTEROL SULFATE 0.83 MG/ML
2.5 SOLUTION RESPIRATORY (INHALATION) AS NEEDED
Start: 2022-06-22

## 2022-05-26 RX ORDER — ACETAMINOPHEN 325 MG/1
650 TABLET ORAL ONCE
Status: CANCELLED
Start: 2022-06-17 | End: 2022-06-17

## 2022-05-26 RX ORDER — SODIUM CHLORIDE 9 MG/ML
25 INJECTION, SOLUTION INTRAVENOUS CONTINUOUS
Status: CANCELLED | OUTPATIENT
Start: 2022-06-24

## 2022-05-26 RX ORDER — HYDROCORTISONE SODIUM SUCCINATE 100 MG/2ML
100 INJECTION, POWDER, FOR SOLUTION INTRAMUSCULAR; INTRAVENOUS AS NEEDED
OUTPATIENT
Start: 2022-07-06

## 2022-05-26 RX ORDER — HYDROCORTISONE SODIUM SUCCINATE 100 MG/2ML
100 INJECTION, POWDER, FOR SOLUTION INTRAMUSCULAR; INTRAVENOUS AS NEEDED
OUTPATIENT
Start: 2022-07-13

## 2022-05-26 RX ORDER — SODIUM CHLORIDE 0.9 % (FLUSH) 0.9 %
10 SYRINGE (ML) INJECTION AS NEEDED
OUTPATIENT
Start: 2022-07-13

## 2022-05-26 RX ORDER — ALBUTEROL SULFATE 0.83 MG/ML
2.5 SOLUTION RESPIRATORY (INHALATION) AS NEEDED
Start: 2022-07-13

## 2022-05-26 RX ORDER — DIPHENHYDRAMINE HYDROCHLORIDE 50 MG/ML
25 INJECTION, SOLUTION INTRAMUSCULAR; INTRAVENOUS AS NEEDED
Start: 2022-07-20

## 2022-05-26 RX ORDER — EPINEPHRINE 1 MG/ML
0.3 INJECTION, SOLUTION, CONCENTRATE INTRAVENOUS AS NEEDED
OUTPATIENT
Start: 2022-07-13

## 2022-05-26 RX ORDER — SODIUM CHLORIDE 9 MG/ML
10 INJECTION INTRAMUSCULAR; INTRAVENOUS; SUBCUTANEOUS AS NEEDED
OUTPATIENT
Start: 2022-06-29

## 2022-05-26 RX ORDER — ACETAMINOPHEN 325 MG/1
650 TABLET ORAL AS NEEDED
Start: 2022-07-13

## 2022-05-26 RX ORDER — SODIUM CHLORIDE 9 MG/ML
25 INJECTION, SOLUTION INTRAVENOUS CONTINUOUS
Status: CANCELLED | OUTPATIENT
Start: 2022-07-01

## 2022-05-26 RX ORDER — HEPARIN 100 UNIT/ML
300-500 SYRINGE INTRAVENOUS AS NEEDED
Start: 2022-07-06

## 2022-05-26 RX ORDER — ONDANSETRON 2 MG/ML
8 INJECTION INTRAMUSCULAR; INTRAVENOUS AS NEEDED
OUTPATIENT
Start: 2022-07-13

## 2022-05-26 RX ORDER — DIPHENHYDRAMINE HYDROCHLORIDE 50 MG/ML
50 INJECTION, SOLUTION INTRAMUSCULAR; INTRAVENOUS ONCE
Status: CANCELLED
Start: 2022-06-10 | End: 2022-06-10

## 2022-05-26 RX ORDER — ALBUTEROL SULFATE 0.83 MG/ML
2.5 SOLUTION RESPIRATORY (INHALATION) AS NEEDED
Start: 2022-06-29

## 2022-05-26 RX ORDER — HYDROCORTISONE SODIUM SUCCINATE 100 MG/2ML
100 INJECTION, POWDER, FOR SOLUTION INTRAMUSCULAR; INTRAVENOUS AS NEEDED
OUTPATIENT
Start: 2022-07-20

## 2022-05-26 RX ORDER — HYDROCORTISONE SODIUM SUCCINATE 100 MG/2ML
100 INJECTION, POWDER, FOR SOLUTION INTRAMUSCULAR; INTRAVENOUS AS NEEDED
OUTPATIENT
Start: 2022-06-29

## 2022-05-26 RX ORDER — SODIUM CHLORIDE 9 MG/ML
10 INJECTION INTRAMUSCULAR; INTRAVENOUS; SUBCUTANEOUS AS NEEDED
OUTPATIENT
Start: 2022-07-06

## 2022-05-26 NOTE — PROGRESS NOTES
[address] [facility] [address] [facility], [address] [place] [address]  [phone]      Hematology Note        Patient: Wagner Carlson MRN: [id number]  SSN: [id number]    YOB: 1985  Age: 43 y.o. Sex: female        Reason for Visit:   Wagner Carlson is a 43 y.o. female who is seen by synchronous (real-time) audio-video technology for follow up of iron def anemia    Subjective:      Wagner Carlson is a 43 y.o. female who I am seeing for iron deficiency anemia. She is known to have iron deficiency anemia. She suffers with menorrhagia. Routine laboratory studies shows iron deficiency anemia. She stays fatigued. She denies rectal bleeding.        Review of Systems:    Constitutional: negative  Eyes: negative  Ears, Nose, Mouth, Throat, and Face: negative  Respiratory: negative  Cardiovascular: negative  Gastrointestinal: negative  Genitourinary:negative  Integument/Breast: negative  Hematologic/Lymphatic: negative  Musculoskeletal:negative  Neurological: negative      Past Medical History:   Diagnosis Date    Anemia 2019    iron deficiency anemia; stopped iron 1 year    COVID-19 virus infection 12/2020    Depression     Empty sella ([address] 8/11/2021    Hemorrhoids     Idiopathic intracranial hypertension 4/5/2021    Iron deficiency anemia 4/4/2017    Vitamin D deficiency 4/4/2017     Past Surgical History:   Procedure Laterality Date    HX GYN  2011      Family History   Problem Relation Age of Onset    Hypertension Mother    [place] Anxiety Mother     Neuropathy Mother     Elevated Lipids Mother     COPD Father     Glaucoma Father     Depression Father     Prostate Cancer Father     Hypertension Sister     No Known Problems Sister     Lupus Sister     Thyroid Disease Sister     Anemia Sister      Social History     Tobacco Use    Smoking status: Never Smoker    Smokeless tobacco: Never Used   Substance Use Topics    Alcohol use: Yes     Comment: occ      Prior to Admission medications    Medication Sig Start Date End Date Taking? Authorizing Provider   cholecalciferol (VITAMIN D3) (5000 Units /125 mcg) capsule Take 1 Capsule by mouth daily. 3/7/22  Yes Margaret Valdez NP   cetirizine (ZYRTEC) 10 mg tablet Take 1 Tablet by mouth daily. 7/6/21  Yes Valeriy Ann MD   acetaZOLAMIDE SR (DIAMOX) 500 mg capsule Take 1 Cap by mouth every twelve (12) hours. Indications: pseudotumor cerebri, a condition with high fluid pressure in the brain 4/29/21  Yes Valeriy Ann MD   butalbital-acetaminophen-caff (Fioricet) -40 mg per capsule Take 1 Cap by mouth every six (6) hours as needed for Headache. 4/6/21  Yes Dean Wolff MD   acetaminophen (TylenoL) 325 mg tablet Take 650 mg by mouth every six (6) hours as needed for Pain. Yes Provider, Historical   aspirin/salicylamide/caffeine (BC HEADACHE POWDER PO) Take  by mouth as needed. Yes Provider, Historical   multivitamin (ONE A DAY) tablet Take 1 Tab by mouth daily. Patient not taking: Reported on 8/11/2021    Provider, Historical            No Known Allergies        Objective:     General: alert, cooperative, no distress   Mental  status: normal mood, behavior, speech, dress, motor activity, and thought processes, able to follow commands   HENT: NCAT   Neck: no visualized mass   Resp: no respiratory distress   Neuro: no gross deficits   Skin: no discoloration or lesions of concern on visible areas   Psychiatric: normal affect, consistent with stated mood, no evidence of hallucinations       Due to this being a TeleHealth evaluation (During WTT-71 public health emergency), many elements of the physical examination are unable to be assessed. Evaluation of the following organ systems was limited: Vitals/Constitutional/EENT/Resp/CV/GI//MS/Neuro/Skin/Heme-Lymph-Imm.       Lab Results   Component Value Date/Time    WBC 4.6 03/15/2022 08:41 AM    HGB 11.4 03/15/2022 08:41 AM    HCT 39.3 03/15/2022 08:41 AM    PLATELET 944 03/47/3828 08:41 AM    MCV 74 (L) 03/15/2022 08:41 AM           Lab Results   Component Value Date/Time    Iron 62 03/15/2022 08:41 AM    TIBC 473 (H) 03/15/2022 08:41 AM    Iron % saturation 13 (L) 03/15/2022 08:41 AM    Ferritin 5 (L) 03/15/2022 08:41 AM           Assessment:     1. Iron deficiency anemia from menorrhagia:    She is intolerant to PO iron. IV iron in [facility]      Plan:       1. IV Iron in [facility]  2. Labs in 3 and 6 months  3. Return in 6 monhs      Signed by: Mariela Pinto MD                     May 26, 2022       CC. Wendy Allison NP         The patient was evaluated through a synchronous (real-time) audio-video encounter. The patient (or guardian if applicable) is aware that this is a billable service, which includes applicable co-pays. This Virtual Visit was conducted with patient's (and/or legal guardian's) consent. The visit was conducted pursuant to the emergency declaration under the [facility] [address] waiver authority and the [employer] General Act. Patient identification was verified, and a caregiver was present when appropriate. The patient was located in a state where the provider was licensed to provide care.

## 2022-05-26 NOTE — PROGRESS NOTES
Last IV iron was 5/2021. Injectafer. Pt agrees to get venofer. Labs in 3 months and see in 6 months with repeat labs.

## 2022-06-08 ENCOUNTER — TELEPHONE (OUTPATIENT)
Dept: ONCOLOGY | Age: 37
End: 2022-06-08

## 2022-06-08 NOTE — TELEPHONE ENCOUNTER
Called pt. HIPAA verified by two patient identifiers. She received lab slips in the mail so she will get them done this week. She is aware to save the other lab slips for 3 months down the road. She will let us know when she gets them drawn.

## 2022-06-10 LAB
BASOPHILS # BLD AUTO: 0.1 X10E3/UL (ref 0–0.2)
BASOPHILS NFR BLD AUTO: 2 %
EOSINOPHIL # BLD AUTO: 0.2 X10E3/UL (ref 0–0.4)
EOSINOPHIL NFR BLD AUTO: 4 %
ERYTHROCYTE [DISTWIDTH] IN BLOOD BY AUTOMATED COUNT: 15.2 % (ref 11.7–15.4)
FERRITIN SERPL-MCNC: 7 NG/ML (ref 15–150)
HCT VFR BLD AUTO: 35.8 % (ref 34–46.6)
HGB BLD-MCNC: 10.3 G/DL (ref 11.1–15.9)
IMM GRANULOCYTES # BLD AUTO: 0 X10E3/UL (ref 0–0.1)
IMM GRANULOCYTES NFR BLD AUTO: 0 %
IRON SATN MFR SERPL: 3 % (ref 15–55)
IRON SERPL-MCNC: 12 UG/DL (ref 27–159)
LYMPHOCYTES # BLD AUTO: 1.6 X10E3/UL (ref 0.7–3.1)
LYMPHOCYTES NFR BLD AUTO: 28 %
MCH RBC QN AUTO: 21 PG (ref 26.6–33)
MCHC RBC AUTO-ENTMCNC: 28.8 G/DL (ref 31.5–35.7)
MCV RBC AUTO: 73 FL (ref 79–97)
MONOCYTES # BLD AUTO: 0.6 X10E3/UL (ref 0.1–0.9)
MONOCYTES NFR BLD AUTO: 11 %
NEUTROPHILS # BLD AUTO: 3.1 X10E3/UL (ref 1.4–7)
NEUTROPHILS NFR BLD AUTO: 55 %
PLATELET # BLD AUTO: 333 X10E3/UL (ref 150–450)
RBC # BLD AUTO: 4.91 X10E6/UL (ref 3.77–5.28)
TIBC SERPL-MCNC: 444 UG/DL (ref 250–450)
UIBC SERPL-MCNC: 432 UG/DL (ref 131–425)
WBC # BLD AUTO: 5.6 X10E3/UL (ref 3.4–10.8)

## 2022-06-22 ENCOUNTER — PATIENT MESSAGE (OUTPATIENT)
Dept: FAMILY MEDICINE CLINIC | Age: 37
End: 2022-06-22

## 2022-06-22 ENCOUNTER — HOSPITAL ENCOUNTER (OUTPATIENT)
Dept: INFUSION THERAPY | Age: 37
End: 2022-06-22

## 2022-06-28 NOTE — PROGRESS NOTES
Tried to call and speak with Ms. Solange Tay to confirm that she has IV venofer appointments set up with [facility]. No VM for number provided.

## 2022-06-28 NOTE — TELEPHONE ENCOUNTER
From: Bal Penny  To: Aba Tovar NP  Sent: 6/22/2022 4:03 PM EDT  Subject: Iliana Chávez! My FMLA form states 24 hours a month(I may need off) instead of those three day I work my last hour of work from home(which helps out a lot). Would it be possible for Sheri Tamez to ok me to work from home from 3:00 till 4:00(which I have done for the past year) My manager just want it more specific in writing(so sorry to bother you with this). I thank you for your time in advance!

## 2022-06-29 ENCOUNTER — HOSPITAL ENCOUNTER (OUTPATIENT)
Dept: INFUSION THERAPY | Age: 37
End: 2022-06-29

## 2022-07-06 ENCOUNTER — APPOINTMENT (OUTPATIENT)
Dept: INFUSION THERAPY | Age: 37
End: 2022-07-06

## 2022-07-13 ENCOUNTER — APPOINTMENT (OUTPATIENT)
Dept: INFUSION THERAPY | Age: 37
End: 2022-07-13

## 2022-07-20 ENCOUNTER — APPOINTMENT (OUTPATIENT)
Dept: INFUSION THERAPY | Age: 37
End: 2022-07-20

## 2022-07-25 ENCOUNTER — OFFICE VISIT (OUTPATIENT)
Dept: NEUROLOGY | Age: 37
End: 2022-07-25
Payer: COMMERCIAL

## 2022-07-25 VITALS
SYSTOLIC BLOOD PRESSURE: 110 MMHG | BODY MASS INDEX: 29.34 KG/M2 | RESPIRATION RATE: 16 BRPM | HEART RATE: 80 BPM | DIASTOLIC BLOOD PRESSURE: 70 MMHG | TEMPERATURE: 98 F | OXYGEN SATURATION: 100 % | WEIGHT: 193.6 LBS | HEIGHT: 68 IN

## 2022-07-25 DIAGNOSIS — H53.2 DIPLOPIA: ICD-10-CM

## 2022-07-25 DIAGNOSIS — G43.119 INTRACTABLE MIGRAINE WITH AURA WITHOUT STATUS MIGRAINOSUS: ICD-10-CM

## 2022-07-25 DIAGNOSIS — G93.2 IDIOPATHIC INTRACRANIAL HYPERTENSION: Primary | ICD-10-CM

## 2022-07-25 PROCEDURE — 99214 OFFICE O/P EST MOD 30 MIN: CPT | Performed by: NURSE PRACTITIONER

## 2022-07-25 RX ORDER — OMEPRAZOLE 20 MG/1
20 CAPSULE, DELAYED RELEASE ORAL DAILY
COMMUNITY
Start: 2022-06-22

## 2022-07-25 RX ORDER — SUMATRIPTAN 25 MG/1
TABLET, FILM COATED ORAL
Qty: 9 TABLET | Refills: 3 | Status: SHIPPED | OUTPATIENT
Start: 2022-07-25

## 2022-07-25 NOTE — PROGRESS NOTES
[facility]  [facility] [address] [place] [address]  [place]  Tel: [phone]  Fax: [phone]      Date:  22     Name:  Kaycee Amaya  :  1985  MRN:  [id number]     PCP:  Mallory Colón NP    Chief Complaint   Patient presents with    Follow-up     Seen by Dr Bozena Martin 21 C/O double vision and tinnitus. HISTORY OF PRESENT ILLNESS:  Patient presents today for ongoing headaches, and vision issues. She did seeEndo: everything was fine in relation to empty sella  IIH: She was seen by Dr. Humberto Sharif,  [address] Ophthalmology: This is a 23-year-old female with a diagnosis of idiopathic intracranial hypertension based on elevated opening pressure on lumbar puncture, and MRI findings. It is not clear if she ever had papilledema. She does not have papilledema now. This could be because of effective treatment, or she could have a variant of IIH without papilledema. Her double vision is monocular in both eyes, and not neurologic pattern. This is more likely due to refractive error or ocular surface disease. Would recommend the use of artificial tears and updated refraction. Her main neurologic symptom at this point is ongoing headaches. Would recommend that we switch from Diamox to Topamax for better headache control. We can start at 25 mg daily and gradually titrate up to 50 mg twice daily as needed and as tolerated. We discussed potential side effects. Follow-up again in 2 months to see how she is doing after the medication change. Per patient she tried the Topamax, she did not like how she felt with that, she was queasy made her head hurt worse she tried it for several weeks. She has seen her regular eye doctor, prisms were placed, didn't seem to help. Was doing pretty good. Has mild headaches. Over the past 2 months, they have increased. One of them recently lasted an last 3-4 days. Across the eyes, into the back of her head.   C/o light and sound sensitivity. C/o some nausea, no vomiting. Will get an aura, broken up lines at times before certain headaches. 6-7 total headache days. Sleep is ok. No smoking, wine rarely. Exercises regularly 2-3 x week. C/o stressors at work. No specific triggers. Warm compress, laying down in a dark quiet room. Fioricet wasn't really working. Trying to avoid BC Powders. REVIEW OF SYSTEMS:     Review of Systems   HENT: Negative. Eyes:  Positive for double vision (B eyes,). Negative for blurred vision. Gastrointestinal:  Negative for nausea and vomiting. Neurological:  Positive for dizziness and headaches. Negative for tingling, tremors, sensory change and seizures. Psychiatric/Behavioral:  Negative for depression. The patient is not nervous/anxious and does not have insomnia.  but things are better. All other systems reviewed and are negative. Current Outpatient Medications   Medication Sig    omeprazole (PRILOSEC) 20 mg capsule Take 20 mg by mouth in the morning. cholecalciferol (VITAMIN D3) (5000 Units /125 mcg) capsule Take 1 Capsule by mouth daily. cetirizine (ZYRTEC) 10 mg tablet Take 1 Tablet by mouth daily. (Patient taking differently: Take 10 mg by mouth daily as needed.)    acetaZOLAMIDE SR (DIAMOX) 500 mg capsule Take 1 Cap by mouth every twelve (12) hours. Indications: pseudotumor cerebri, a condition with high fluid pressure in the brain    acetaminophen (TYLENOL) 325 mg tablet Take 650 mg by mouth every six (6) hours as needed for Pain. aspirin/salicylamide/caffeine (BC HEADACHE POWDER PO) Take  by mouth as needed. multivitamin (ONE A DAY) tablet Take 1 Tablet by mouth in the morning. butalbital-acetaminophen-caff (Fioricet) -40 mg per capsule Take 1 Cap by mouth every six (6) hours as needed for Headache. (Patient not taking: Reported on 7/25/2022)     No current facility-administered medications for this visit.      No Known Allergies  Past Medical History:   Diagnosis Date    Anemia 2019    iron deficiency anemia; stopped iron 1 year    COVID-19 virus infection 12/2020    Depression     Empty sella ([address] 8/11/2021    Hemorrhoids     Idiopathic intracranial hypertension 4/5/2021    Iron deficiency anemia 4/4/2017    Vitamin D deficiency 4/4/2017     Past Surgical History:   Procedure Laterality Date    HX GYN  2011    HX TUBAL LIGATION Bilateral     Nov 17,2011     Social History     Socioeconomic History    Marital status: SINGLE     Spouse name: Not on file    Number of children: Not on file    Years of education: Not on file    Highest education level: Not on file   Occupational History    Not on file   Tobacco Use    Smoking status: Never    Smokeless tobacco: Never   Vaping Use    Vaping Use: Never used   Substance and Sexual Activity    Alcohol use: Yes     Comment: occ    Drug use: No    Sexual activity: Yes     Partners: Male     Birth control/protection: Surgical   Other Topics Concern    Not on file   Social History Narrative    Not on file     Social Determinants of Health     Financial Resource Strain: Not on file   Food Insecurity: Not on file   Transportation Needs: Not on file   Physical Activity: Not on file   Stress: Not on file   Social Connections: Not on file   Intimate Partner Violence: Not on file   Housing Stability: Not on file     Family History   Problem Relation Age of Onset    Hypertension Mother     Anxiety Mother     Neuropathy Mother     Elevated Lipids Mother     COPD Father     Glaucoma Father     Depression Father     Prostate Cancer Father     Hypertension Sister     No Known Problems Sister     Lupus Sister     Thyroid Disease Sister     Anemia Sister          PHYSICAL EXAMINATION:    Visit Vitals  /70 (BP 1 Location: Left upper arm, BP Patient Position: Sitting, BP Cuff Size: Large adult)   Pulse 80   Temp 98 °F (36.7 °C) (Temporal)   Resp 16   Ht 5' 8\" (1.727 m)   Wt 193 lb 9.6 oz (87.8 kg)   SpO2 100%   BMI 29.44 kg/m²     General:  Well defined, nourished, and well groomed individual in no acute distress. Neck: Supple, nontender, normal range of motion. Musculoskeletal:  Extremities revealed no edema and had full range of motion of joints. Psych:  Good mood and bright affect    NEUROLOGICAL EXAMINATION:     Mental Status:   Alert and oriented to person, place, and time with recent and remote memory intact. Attention span and concentration are normal. Clear speech. Fund of knowledge preserved. Cranial Nerves:   PERRLA. Visual fields were full  EOM: no evidence of nystagmus  Facial sensation:  normal and symmetric  Facial motor: normal and symmetric, no facial droop noted. Hearing intact  SCM strength intact  Tongue: midline without fasciculations    Motor Examination: Normal tone. 5/5 muscle strength in bilateral upper and lower extremities. No muscle wasting, no twitching or fasciculation noted. Sensory exam:  Normal throughout to light touch in BUE and BLE    Coordination:   Finger to nose and rapid arm movement testing was normal.  No resting or intention tremor. Negative Romberg, negative pronator drift. Gait and Station:  Steady while walking on toes, heels, and with tandem walking. Normal arm swing. Reflexes:  DTRs 2+ in bilateral biceps, brachioradialis, patella and ankle. No clonus noted. ASSESSMENT AND PLAN      ICD-10-CM ICD-9-CM    1. Idiopathic intracranial hypertension  G93.2 348.2       2. Intractable migraine with aura without status migrainosus  G43.119 346.01 SUMAtriptan (IMITREX) 25 mg tablet      3. Diplopia  H53.2 368.2         1. Idiopathic intracranial hypertension: Patient was seen by neuro-ophthalmology Dr. Danika Temple July 2021 he had wanted her seen back in 2 months, but she did not make appointment. Patient has been continued on the Diamox, without difficulty.   He had done a trial of Topamax however she had side effects to it so she stopped it but she is taking Diamox. Discussed that she needs to schedule follow-up appointment with him as soon as possible for further evaluation. 2. Intractable migraine with aura without status migrainosus: These headaches can definitely be coming from the [address] however I will give her a prescription for Imitrex that she can take as needed for that headache/migraine. Safety side effects discussed with patient. She is to limit BC powder use. Continue to identify triggers, headache diary etc. to better identify what is contributing to her symptoms  -     SUMAtriptan (IMITREX) 25 mg tablet; 1 tablet prn bad headache/migraine, may repeat in 2 hours if needed., Normal, Disp-9 Tablet, R-3  3. Diplopia: Patient is to follow-up with neuro-ophthalmology as well as her eye doctor. Patient and/or family verbalized understand of all instructions and all questions/concerns were addressed. Safety/side effects of medications discussed. Patient remains a complex patient secondary to polypharmacy, significant comorbid conditions, and use of multiple medications which complicate the decision making process related to patient's neurologic diagnosis. Patient is contact me and let me know how she is doing with the new addition of the Imitrex. ,  Upon reviewing her chart later noted that Dr. Arabella Bishop neuro-ophthalmology had monitor seen, I called the patient notified she needs to schedule follow-up with them since possible.     HILARIO Christianson-BC

## 2022-07-25 NOTE — PROGRESS NOTES
Chief Complaint   Patient presents with    Follow-up     Seen by Dr Tony Zavala 7/6/21 C/O double vision and tinnitus. 1. Have you been to the ER, urgent care clinic since your last visit? NO  Hospitalized since your last visit? NO    2. Have you seen or consulted any other health care providers outside of the [address] [place] [address] since your last visit? GI & eye exam  Include any pap smears or colon screening. Papsmear     Patient also C/O mild headaches lasting  a few days around her eyes (BC help and tylenol) with some cognitive decline.

## 2023-03-02 NOTE — PROGRESS NOTES
Jcarlos Jimenez is a 42 y.o. female here for virtual visit follow up for SAYDA. She received iron at [facility]. Pt states she is doing well. She is now taking Tranexamic acid to help with bleeding. 1. Have you been to the ER, urgent care clinic since your last visit? Hospitalized since your last visit? no    2. Have you seen or consulted any other health care providers outside of the [address] [place] [address] since your last visit? Include any pap smears or colon screening.   no

## 2023-03-06 ENCOUNTER — VIRTUAL VISIT (OUTPATIENT)
Dept: ONCOLOGY | Age: 38
End: 2023-03-06
Payer: COMMERCIAL

## 2023-03-06 DIAGNOSIS — D50.0 IRON DEFICIENCY ANEMIA SECONDARY TO BLOOD LOSS (CHRONIC): Primary | ICD-10-CM

## 2023-03-06 PROCEDURE — 99213 OFFICE O/P EST LOW 20 MIN: CPT | Performed by: INTERNAL MEDICINE

## 2023-03-06 RX ORDER — TRANEXAMIC ACID 650 MG/1
TABLET ORAL
COMMUNITY
Start: 2023-02-24

## 2023-03-06 NOTE — PROGRESS NOTES
[address] [facility] [address], [address] [facility], [address] [place] [address]  [phone]      Hematology Note        Patient: Serena Martell MRN: [id number]  SSN: [id number]    YOB: 1985  Age: 42 y.o. Sex: female        Reason for Visit:   Serena Martell is a 42 y.o. female who is seen by synchronous (real-time) audio-video technology for follow up of iron def anemia    Subjective:      Serena Martell is a 42 y.o. female who I am seeing for iron deficiency anemia. She is known to have iron deficiency anemia. She suffers with menorrhagia. Routine laboratory studies shows iron deficiency anemia. She stays fatigued. She denies rectal bleeding. She had IV iron in 09/2022. She recently started [facility] for excessive vaginal bleeding.        Review of Systems:    Constitutional: negative  Eyes: negative  Ears, Nose, Mouth, Throat, and Face: negative  Respiratory: negative  Cardiovascular: negative  Gastrointestinal: negative  Genitourinary:negative  Integument/Breast: negative  Hematologic/Lymphatic: negative  Musculoskeletal:negative  Neurological: negative      Past Medical History:   Diagnosis Date    Anemia 2019    iron deficiency anemia; stopped iron 1 year    COVID-19 virus infection 12/2020    Depression     Empty sella ([address] 8/11/2021    Hemorrhoids     Idiopathic intracranial hypertension 4/5/2021    Iron deficiency anemia 4/4/2017    Vitamin D deficiency 4/4/2017     Past Surgical History:   Procedure Laterality Date    HX GYN  2011    HX TUBAL LIGATION Bilateral     Nov 17,2011      Family History   Problem Relation Age of Onset    Hypertension Mother     Anxiety Mother     Neuropathy Mother     Elevated Lipids Mother     COPD Father     Glaucoma Father     Depression Father     Prostate Cancer Father     Hypertension Sister     No Known Problems Sister     Lupus Sister     Thyroid Disease Sister     Anemia Sister Social History     Tobacco Use    Smoking status: Never    Smokeless tobacco: Never   Substance Use Topics    Alcohol use: Yes     Comment: occ      Prior to Admission medications    Medication Sig Start Date End Date Taking? Authorizing Provider   tranexamic acid (LYSTEDA) 650 mg tab tablet  2/24/23   Provider, Historical   omeprazole (PRILOSEC) 20 mg capsule Take 20 mg by mouth in the morning. 6/22/22   Provider, Historical   SUMAtriptan (IMITREX) 25 mg tablet 1 tablet prn bad headache/migraine, may repeat in 2 hours if needed. 7/25/22   Char Camacho NP   cholecalciferol (VITAMIN D3) (5000 Units /125 mcg) capsule Take 1 Capsule by mouth daily. 3/7/22   Miko Valdez NP   cetirizine (ZYRTEC) 10 mg tablet Take 1 Tablet by mouth daily. Patient taking differently: Take 10 mg by mouth daily as needed. 7/6/21   Jodie Gaxiola MD   acetaZOLAMIDE SR (DIAMOX) 500 mg capsule Take 1 Cap by mouth every twelve (12) hours. Indications: pseudotumor cerebri, a condition with high fluid pressure in the brain 4/29/21   Jodie Gaxiola MD   acetaminophen (TYLENOL) 325 mg tablet Take 650 mg by mouth every six (6) hours as needed for Pain. Provider, Historical   aspirin/salicylamide/caffeine (BC HEADACHE POWDER PO) Take  by mouth as needed. Provider, Historical   multivitamin (ONE A DAY) tablet Take 1 Tablet by mouth in the morning.     Provider, Historical            No Known Allergies        Objective:     General: alert, cooperative, no distress   Mental  status: normal mood, behavior, speech, dress, motor activity, and thought processes, able to follow commands   HENT: NCAT   Neck: no visualized mass   Resp: no respiratory distress   Neuro: no gross deficits   Skin: no discoloration or lesions of concern on visible areas   Psychiatric: normal affect, consistent with stated mood, no evidence of hallucinations       Due to this being a TeleHealth evaluation (During [place] [address] public health emergency), many elements of the physical examination are unable to be assessed. Evaluation of the following organ systems was limited: Vitals/Constitutional/EENT/Resp/CV/GI//MS/Neuro/Skin/Heme-Lymph-Imm. Lab Results   Component Value Date/Time    WBC 5.6 06/09/2022 02:55 PM    HGB 10.3 (L) 06/09/2022 02:55 PM    HCT 35.8 06/09/2022 02:55 PM    PLATELET 256 47/15/3099 02:55 PM    MCV 73 (L) 06/09/2022 02:55 PM           Lab Results   Component Value Date/Time    Iron 12 (L) 06/09/2022 02:55 PM    TIBC 444 06/09/2022 02:55 PM    Iron % saturation 3 (LL) 06/09/2022 02:55 PM    Ferritin 7 (L) 06/09/2022 02:55 PM           Assessment:     1. Iron deficiency anemia from menorrhagia:    She is intolerant to PO iron. IV iron in [facility] in the past      Plan:       1. IV Iron in [facility] as needed  2. Labs now in 3 and 6 months  3. Return in 6 monhs      Signed by: Liliana Rock MD                     March 6, 2023       CC. Roque Madsen NP         The patient was evaluated through a synchronous (real-time) audio-video encounter. The patient (or guardian if applicable) is aware that this is a billable service, which includes applicable co-pays. This Virtual Visit was conducted with patient's (and/or legal guardian's) consent. The visit was conducted pursuant to the emergency declaration under the [address] [place] [address], [address] [place] [address] authority and the [employer] General Act. Patient identification was verified, and a caregiver was present when appropriate. The patient was located in a state where the provider was licensed to provide care.

## 2023-03-24 LAB
BASOPHILS # BLD AUTO: 0.1 X10E3/UL (ref 0–0.2)
BASOPHILS NFR BLD AUTO: 1 %
EOSINOPHIL # BLD AUTO: 0.2 X10E3/UL (ref 0–0.4)
EOSINOPHIL NFR BLD AUTO: 2 %
ERYTHROCYTE [DISTWIDTH] IN BLOOD BY AUTOMATED COUNT: 14.4 % (ref 11.7–15.4)
FERRITIN SERPL-MCNC: 5 NG/ML (ref 15–150)
HCT VFR BLD AUTO: 31.6 % (ref 34–46.6)
HGB BLD-MCNC: 9.4 G/DL (ref 11.1–15.9)
IMM GRANULOCYTES # BLD AUTO: 0 X10E3/UL (ref 0–0.1)
IMM GRANULOCYTES NFR BLD AUTO: 0 %
IRON SATN MFR SERPL: 3 % (ref 15–55)
IRON SERPL-MCNC: 12 UG/DL (ref 27–159)
LYMPHOCYTES # BLD AUTO: 2.1 X10E3/UL (ref 0.7–3.1)
LYMPHOCYTES NFR BLD AUTO: 33 %
MCH RBC QN AUTO: 20.4 PG (ref 26.6–33)
MCHC RBC AUTO-ENTMCNC: 29.7 G/DL (ref 31.5–35.7)
MCV RBC AUTO: 69 FL (ref 79–97)
MONOCYTES # BLD AUTO: 0.5 X10E3/UL (ref 0.1–0.9)
MONOCYTES NFR BLD AUTO: 8 %
NEUTROPHILS # BLD AUTO: 3.5 X10E3/UL (ref 1.4–7)
NEUTROPHILS NFR BLD AUTO: 56 %
PLATELET # BLD AUTO: 301 X10E3/UL (ref 150–450)
RBC # BLD AUTO: 4.61 X10E6/UL (ref 3.77–5.28)
TIBC SERPL-MCNC: 471 UG/DL (ref 250–450)
UIBC SERPL-MCNC: 459 UG/DL (ref 131–425)
WBC # BLD AUTO: 6.2 X10E3/UL (ref 3.4–10.8)

## 2023-04-05 RX ORDER — CHOLECALCIFEROL (VITAMIN D3) 125 MCG
5000 CAPSULE ORAL DAILY
Qty: 90 CAPSULE | Refills: 0 | Status: SHIPPED
Start: 2023-04-05

## 2023-04-05 NOTE — TELEPHONE ENCOUNTER
Last Visit: 3/7/22 with NP José Miguel  Next Appointment: 4/17/23 with NP José Miguel  Previous Refill Encounter(s): 3/7/22 #90 with 3 refills    Requested Prescriptions     Pending Prescriptions Disp Refills    cholecalciferol (VITAMIN D3) (5000 Units /125 mcg) capsule 90 Capsule 3     Sig: Take 1 Capsule by mouth daily. For Pharmacy Admin Tracking Only    Program: Medication Refill  CPA in place:   Recommendation Provided To:    Intervention Detail: New Rx: 1, reason: Patient Preference  Intervention Accepted By:   Pedro Rice Closed?:   Time Spent (min): 5

## 2023-04-17 ENCOUNTER — VIRTUAL VISIT (OUTPATIENT)
Dept: FAMILY MEDICINE CLINIC | Age: 38
End: 2023-04-17
Payer: COMMERCIAL

## 2023-04-17 DIAGNOSIS — D50.9 IRON DEFICIENCY ANEMIA, UNSPECIFIED IRON DEFICIENCY ANEMIA TYPE: ICD-10-CM

## 2023-04-17 DIAGNOSIS — Z13.220 SCREENING FOR LIPID DISORDERS: ICD-10-CM

## 2023-04-17 DIAGNOSIS — G93.2 IDIOPATHIC INTRACRANIAL HYPERTENSION: Primary | ICD-10-CM

## 2023-04-17 DIAGNOSIS — E55.9 VITAMIN D DEFICIENCY: ICD-10-CM

## 2023-04-17 DIAGNOSIS — E23.6 EMPTY SELLA (HCC): ICD-10-CM

## 2023-04-17 DIAGNOSIS — N92.0 MENORRHAGIA WITH REGULAR CYCLE: ICD-10-CM

## 2023-04-17 DIAGNOSIS — Z79.899 ENCOUNTER FOR LONG-TERM (CURRENT) USE OF MEDICATIONS: ICD-10-CM

## 2023-04-17 PROCEDURE — 99214 OFFICE O/P EST MOD 30 MIN: CPT | Performed by: NURSE PRACTITIONER

## 2023-04-17 NOTE — PROGRESS NOTES
Sydney Wolf (: 1985) is a 42 y.o. female, established patient, here for evaluation of the following chief complaint(s):   No chief complaint on file. ASSESSMENT/PLAN:  Below is the assessment and plan developed based on review of pertinent history, labs, studies, and medications. 1. Idiopathic intracranial hypertension  2. Empty sella ([address]  3. Iron deficiency anemia, unspecified iron deficiency anemia type  4. Menorrhagia with regular cycle  -     TSH 3RD GENERATION; Future  5. Vitamin D deficiency  -     VITAMIN D, 25 HYDROXY; Future  6. Screening for lipid disorders  -     LIPID PANEL; Future  7. Encounter for long-term (current) use of medications  -     METABOLIC PANEL, COMPREHENSIVE; Future  -     HEMOGLOBIN A1C WITH EAG; Future  -     LIPID PANEL; Future  -     VITAMIN D, 25 HYDROXY; Future  -     TSH 3RD GENERATION; Future  -     URINALYSIS W/MICROSCOPIC; Future        SUBJECTIVE/OBJECTIVE:  HPI  patient is seen virtually today for follow up SAYDA, headaches, partial empty sella. Saw Dr. Kevin Morales in March, her iron levels are low again. States she has been having menses again - wants to do iron infusions  She has seen GYN - was prescribed Lysteda to take the first 5 days of cycle to help control flow. She has not started yet. She had repeat US, fibroids are not large. Followed by neurology for IIH. No longer taking Diamox. Seen yearly. Has not seen neuroophthal since . Followed by regular ophthalmologist - Cathy Armstrong not a focal issues, to wear special type of prism glasses. States she is dealing with it. She is not a candidate for corrective eye surgery because of her dry eyes      Endo - - Dr. Grant Tapia - followed for partial empty sella. Needs FMLA completed for chronic conditions  Does not need altered scheduled. Needs intermittent leave 2-3 times monthyl, 8h per episode.   Has follow up appts every 3 months    Review of Systems   Constitutional:  Negative for chills, diaphoresis, fatigue, fever and unexpected weight change. Eyes:  Positive for visual disturbance. Respiratory:  Negative for cough and shortness of breath. Cardiovascular:  Negative for chest pain, palpitations and leg swelling. Gastrointestinal:  Negative for abdominal pain, blood in stool, constipation, diarrhea, nausea and vomiting. Endocrine: Negative for cold intolerance and heat intolerance. Genitourinary:  Positive for menstrual problem. Negative for dysuria, flank pain, frequency, hematuria, urgency and vaginal discharge. Musculoskeletal:  Negative for back pain and myalgias. Skin: Negative. Neurological:  Positive for headaches. Negative for dizziness, speech difficulty, light-headedness and numbness. Psychiatric/Behavioral:  Negative for dysphoric mood and sleep disturbance. The patient is not nervous/anxious. No data recorded     Physical Exam  Constitutional: [x] Appears well-developed and well-nourished [x] No apparent distress      Mental status: [x] Alert and awake  [x] Oriented to person/place/time [x] Able to follow commands      Neck: [x] No visualized mass    Pulmonary/Chest: [x] Respiratory effort normal   [x] No visualized signs of difficulty breathing or respiratory distresS     Musculoskeletal:   [x] Normal range of motion of neck    Neurological:        [x] No Facial Asymmetry (Cranial nerve 7 motor function) (limited exam due to video visit)           Skin:        [x] No significant exanthematous lesions or discoloration noted on facial skin            Psychiatric:       [x] Normal Affect       [x] No Hallucinations      On this date 04/17/2023 I have spent 35 minutes reviewing previous notes, test results and face to face (virtual) with the patient discussing the diagnosis and importance of compliance with the treatment plan as well as documenting on the day of the visit. Rima Moreau, was evaluated through a synchronous (real-time) audio-video encounter. The patient (or guardian if applicable) is aware that this is a billable service, which includes applicable co-pays. This Virtual Visit was conducted with patient's (and/or legal guardian's) consent. The visit was conducted pursuant to the emergency declaration under the [address] [place] [address], [address] [place] [address] authority and the [employer] General Act. Patient identification was verified, and a caregiver was present when appropriate. The patient was located at: Home: [address] [address]  The provider was located at: Home: [id number] [facility] was used to authenticate this note.   -- Julio Curtis NP

## 2023-04-18 ENCOUNTER — APPOINTMENT (OUTPATIENT)
Dept: INFUSION THERAPY | Age: 38
End: 2023-04-18

## 2023-04-25 ENCOUNTER — APPOINTMENT (OUTPATIENT)
Dept: INFUSION THERAPY | Age: 38
End: 2023-04-25

## 2023-05-02 ENCOUNTER — APPOINTMENT (OUTPATIENT)
Dept: INFUSION THERAPY | Age: 38
End: 2023-05-02

## 2023-05-09 ENCOUNTER — APPOINTMENT (OUTPATIENT)
Dept: INFUSION THERAPY | Age: 38
End: 2023-05-09

## 2023-05-16 ENCOUNTER — APPOINTMENT (OUTPATIENT)
Dept: INFUSION THERAPY | Age: 38
End: 2023-05-16

## 2023-10-02 ENCOUNTER — TELEPHONE (OUTPATIENT)
Age: 38
End: 2023-10-02

## 2023-10-02 NOTE — TELEPHONE ENCOUNTER
Called patient to reschedule appt due to provider is out. N/A - patient has been tentatively rescheduled and left a message to call office.

## 2023-11-15 DIAGNOSIS — D50.0 IRON DEFICIENCY ANEMIA SECONDARY TO BLOOD LOSS (CHRONIC): Primary | ICD-10-CM

## 2023-11-15 NOTE — PROGRESS NOTES
SARAH FROM DR Lindsay Rangel CBC WITH DIFF FERRITIN IRON PROFILE. Pt was a no show today. Per last notes it looks like pt did not return call to confirm today appt from 11/6 to today.

## 2024-04-05 LAB
BASOPHILS # BLD AUTO: 0.1 X10E3/UL (ref 0–0.2)
BASOPHILS NFR BLD AUTO: 1 %
EOSINOPHIL # BLD AUTO: 0.2 X10E3/UL (ref 0–0.4)
EOSINOPHIL NFR BLD AUTO: 3 %
ERYTHROCYTE [DISTWIDTH] IN BLOOD BY AUTOMATED COUNT: 13.1 % (ref 11.7–15.4)
FERRITIN SERPL-MCNC: 5 NG/ML (ref 15–150)
HCT VFR BLD AUTO: 32 % (ref 34–46.6)
HGB BLD-MCNC: 9.3 G/DL (ref 11.1–15.9)
IMM GRANULOCYTES # BLD AUTO: 0 X10E3/UL (ref 0–0.1)
IMM GRANULOCYTES NFR BLD AUTO: 0 %
IRON SATN MFR SERPL: 4 % (ref 15–55)
IRON SERPL-MCNC: 17 UG/DL (ref 27–159)
LYMPHOCYTES # BLD AUTO: 2.1 X10E3/UL (ref 0.7–3.1)
LYMPHOCYTES NFR BLD AUTO: 30 %
MCH RBC QN AUTO: 21.7 PG (ref 26.6–33)
MCHC RBC AUTO-ENTMCNC: 29.1 G/DL (ref 31.5–35.7)
MCV RBC AUTO: 75 FL (ref 79–97)
MONOCYTES # BLD AUTO: 0.7 X10E3/UL (ref 0.1–0.9)
MONOCYTES NFR BLD AUTO: 10 %
NEUTROPHILS # BLD AUTO: 3.8 X10E3/UL (ref 1.4–7)
NEUTROPHILS NFR BLD AUTO: 56 %
PLATELET # BLD AUTO: 427 X10E3/UL (ref 150–450)
RBC # BLD AUTO: 4.28 X10E6/UL (ref 3.77–5.28)
TIBC SERPL-MCNC: 453 UG/DL (ref 250–450)
UIBC SERPL-MCNC: 436 UG/DL (ref 131–425)
WBC # BLD AUTO: 6.9 X10E3/UL (ref 3.4–10.8)

## 2024-09-09 ENCOUNTER — HOSPITAL ENCOUNTER (OUTPATIENT)
Facility: HOSPITAL | Age: 39
Discharge: HOME | End: 2024-09-12
Payer: COMMERCIAL

## 2024-09-09 VITALS
OXYGEN SATURATION: 100 % | DIASTOLIC BLOOD PRESSURE: 56 MMHG | RESPIRATION RATE: 14 BRPM | HEIGHT: 67 IN | SYSTOLIC BLOOD PRESSURE: 116 MMHG | BODY MASS INDEX: 32.02 KG/M2 | WEIGHT: 204 LBS | HEART RATE: 87 BPM

## 2024-09-09 LAB
ABO + RH BLD: NORMAL
BASOPHILS # BLD: 0.1 K/UL (ref 0–0.1)
BASOPHILS NFR BLD: 1 % (ref 0–1)
BLOOD GROUP ANTIBODIES SERPL: NORMAL
DIFFERENTIAL METHOD BLD: ABNORMAL
EOSINOPHIL # BLD: 0.2 K/UL (ref 0–0.4)
EOSINOPHIL NFR BLD: 2 % (ref 0–7)
ERYTHROCYTE [DISTWIDTH] IN BLOOD BY AUTOMATED COUNT: 17.1 % (ref 11.5–14.5)
HCT VFR BLD AUTO: 36 % (ref 35–47)
HGB BLD-MCNC: 11 G/DL (ref 11.5–16)
IMM GRANULOCYTES # BLD AUTO: 0 K/UL (ref 0–0.04)
IMM GRANULOCYTES NFR BLD AUTO: 0 % (ref 0–0.5)
LYMPHOCYTES # BLD: 2.2 K/UL (ref 0.8–3.5)
LYMPHOCYTES NFR BLD: 30 % (ref 12–49)
MCH RBC QN AUTO: 22.6 PG (ref 26–34)
MCHC RBC AUTO-ENTMCNC: 30.6 G/DL (ref 30–36.5)
MCV RBC AUTO: 74.1 FL (ref 80–99)
MONOCYTES # BLD: 0.7 K/UL (ref 0–1)
MONOCYTES NFR BLD: 9 % (ref 5–13)
NEUTS SEG # BLD: 4.1 K/UL (ref 1.8–8)
NEUTS SEG NFR BLD: 58 % (ref 32–75)
NRBC # BLD: 0 K/UL (ref 0–0.01)
NRBC BLD-RTO: 0 PER 100 WBC
PLATELET # BLD AUTO: 323 K/UL (ref 150–400)
PMV BLD AUTO: 10.9 FL (ref 8.9–12.9)
RBC # BLD AUTO: 4.86 M/UL (ref 3.8–5.2)
SPECIMEN EXP DATE BLD: NORMAL
WBC # BLD AUTO: 7.2 K/UL (ref 3.6–11)

## 2024-09-09 PROCEDURE — 36415 COLL VENOUS BLD VENIPUNCTURE: CPT

## 2024-09-09 PROCEDURE — 86850 RBC ANTIBODY SCREEN: CPT

## 2024-09-09 PROCEDURE — 86900 BLOOD TYPING SEROLOGIC ABO: CPT

## 2024-09-09 PROCEDURE — 85025 COMPLETE CBC W/AUTO DIFF WBC: CPT

## 2024-09-09 PROCEDURE — 86901 BLOOD TYPING SEROLOGIC RH(D): CPT

## 2024-09-19 RX ORDER — SODIUM CHLORIDE, SODIUM LACTATE, POTASSIUM CHLORIDE, CALCIUM CHLORIDE 600; 310; 30; 20 MG/100ML; MG/100ML; MG/100ML; MG/100ML
INJECTION, SOLUTION INTRAVENOUS CONTINUOUS
Status: CANCELLED | OUTPATIENT
Start: 2024-09-19

## 2024-09-19 RX ORDER — FENTANYL CITRATE 50 UG/ML
100 INJECTION, SOLUTION INTRAMUSCULAR; INTRAVENOUS
Status: CANCELLED | OUTPATIENT
Start: 2024-09-19 | End: 2024-09-20

## 2024-09-19 RX ORDER — MIDAZOLAM HYDROCHLORIDE 2 MG/2ML
2 INJECTION, SOLUTION INTRAMUSCULAR; INTRAVENOUS
Status: CANCELLED | OUTPATIENT
Start: 2024-09-19 | End: 2024-09-20

## 2024-09-19 RX ORDER — LIDOCAINE HYDROCHLORIDE 10 MG/ML
1 INJECTION, SOLUTION EPIDURAL; INFILTRATION; INTRACAUDAL; PERINEURAL
Status: CANCELLED | OUTPATIENT
Start: 2024-09-19 | End: 2024-09-20

## 2024-09-20 ENCOUNTER — ANESTHESIA EVENT (OUTPATIENT)
Facility: HOSPITAL | Age: 39
End: 2024-09-20
Payer: COMMERCIAL

## 2024-09-20 ENCOUNTER — HOSPITAL ENCOUNTER (OUTPATIENT)
Facility: HOSPITAL | Age: 39
Setting detail: OUTPATIENT SURGERY
Discharge: HOME | End: 2024-09-20
Attending: STUDENT IN AN ORGANIZED HEALTH CARE EDUCATION/TRAINING PROGRAM | Admitting: STUDENT IN AN ORGANIZED HEALTH CARE EDUCATION/TRAINING PROGRAM
Payer: COMMERCIAL

## 2024-09-20 ENCOUNTER — ANESTHESIA (OUTPATIENT)
Facility: HOSPITAL | Age: 39
End: 2024-09-20
Payer: COMMERCIAL

## 2024-09-20 VITALS
OXYGEN SATURATION: 100 % | RESPIRATION RATE: 17 BRPM | TEMPERATURE: 97.6 F | HEART RATE: 84 BPM | DIASTOLIC BLOOD PRESSURE: 66 MMHG | SYSTOLIC BLOOD PRESSURE: 114 MMHG

## 2024-09-20 PROCEDURE — 2500000003 HC RX 250 WO HCPCS: Performed by: STUDENT IN AN ORGANIZED HEALTH CARE EDUCATION/TRAINING PROGRAM

## 2024-09-20 PROCEDURE — 7100000001 HC PACU RECOVERY - ADDTL 15 MIN: Performed by: STUDENT IN AN ORGANIZED HEALTH CARE EDUCATION/TRAINING PROGRAM

## 2024-09-20 PROCEDURE — 7100000011 HC PHASE II RECOVERY - ADDTL 15 MIN: Performed by: STUDENT IN AN ORGANIZED HEALTH CARE EDUCATION/TRAINING PROGRAM

## 2024-09-20 PROCEDURE — 2709999900 HC NON-CHARGEABLE SUPPLY: Performed by: STUDENT IN AN ORGANIZED HEALTH CARE EDUCATION/TRAINING PROGRAM

## 2024-09-20 PROCEDURE — 6360000002 HC RX W HCPCS: Performed by: ANESTHESIOLOGY

## 2024-09-20 PROCEDURE — 6360000002 HC RX W HCPCS: Performed by: STUDENT IN AN ORGANIZED HEALTH CARE EDUCATION/TRAINING PROGRAM

## 2024-09-20 PROCEDURE — 3700000001 HC ADD 15 MINUTES (ANESTHESIA): Performed by: STUDENT IN AN ORGANIZED HEALTH CARE EDUCATION/TRAINING PROGRAM

## 2024-09-20 PROCEDURE — 2580000003 HC RX 258: Performed by: STUDENT IN AN ORGANIZED HEALTH CARE EDUCATION/TRAINING PROGRAM

## 2024-09-20 PROCEDURE — 2720000010 HC SURG SUPPLY STERILE: Performed by: STUDENT IN AN ORGANIZED HEALTH CARE EDUCATION/TRAINING PROGRAM

## 2024-09-20 PROCEDURE — 88307 TISSUE EXAM BY PATHOLOGIST: CPT

## 2024-09-20 PROCEDURE — 7100000000 HC PACU RECOVERY - FIRST 15 MIN: Performed by: STUDENT IN AN ORGANIZED HEALTH CARE EDUCATION/TRAINING PROGRAM

## 2024-09-20 PROCEDURE — 3600000004 HC SURGERY LEVEL 4 BASE: Performed by: STUDENT IN AN ORGANIZED HEALTH CARE EDUCATION/TRAINING PROGRAM

## 2024-09-20 PROCEDURE — 3700000000 HC ANESTHESIA ATTENDED CARE: Performed by: STUDENT IN AN ORGANIZED HEALTH CARE EDUCATION/TRAINING PROGRAM

## 2024-09-20 PROCEDURE — 3600000014 HC SURGERY LEVEL 4 ADDTL 15MIN: Performed by: STUDENT IN AN ORGANIZED HEALTH CARE EDUCATION/TRAINING PROGRAM

## 2024-09-20 PROCEDURE — 7100000010 HC PHASE II RECOVERY - FIRST 15 MIN: Performed by: STUDENT IN AN ORGANIZED HEALTH CARE EDUCATION/TRAINING PROGRAM

## 2024-09-20 RX ORDER — ONDANSETRON 2 MG/ML
INJECTION INTRAMUSCULAR; INTRAVENOUS
Status: DISCONTINUED | OUTPATIENT
Start: 2024-09-20 | End: 2024-09-20 | Stop reason: SDUPTHER

## 2024-09-20 RX ORDER — SODIUM CHLORIDE, SODIUM LACTATE, POTASSIUM CHLORIDE, CALCIUM CHLORIDE 600; 310; 30; 20 MG/100ML; MG/100ML; MG/100ML; MG/100ML
INJECTION, SOLUTION INTRAVENOUS
Status: DISCONTINUED | OUTPATIENT
Start: 2024-09-20 | End: 2024-09-20 | Stop reason: SDUPTHER

## 2024-09-20 RX ORDER — HYDROMORPHONE HYDROCHLORIDE 2 MG/ML
INJECTION, SOLUTION INTRAMUSCULAR; INTRAVENOUS; SUBCUTANEOUS
Status: DISCONTINUED | OUTPATIENT
Start: 2024-09-20 | End: 2024-09-20 | Stop reason: SDUPTHER

## 2024-09-20 RX ORDER — DEXAMETHASONE SODIUM PHOSPHATE 4 MG/ML
INJECTION, SOLUTION INTRA-ARTICULAR; INTRALESIONAL; INTRAMUSCULAR; INTRAVENOUS; SOFT TISSUE
Status: DISCONTINUED | OUTPATIENT
Start: 2024-09-20 | End: 2024-09-20 | Stop reason: SDUPTHER

## 2024-09-20 RX ORDER — DIPHENHYDRAMINE HYDROCHLORIDE 50 MG/ML
12.5 INJECTION INTRAMUSCULAR; INTRAVENOUS
Status: DISCONTINUED | OUTPATIENT
Start: 2024-09-20 | End: 2024-09-20 | Stop reason: HOSPADM

## 2024-09-20 RX ORDER — MIDAZOLAM HYDROCHLORIDE 1 MG/ML
INJECTION INTRAMUSCULAR; INTRAVENOUS
Status: DISCONTINUED | OUTPATIENT
Start: 2024-09-20 | End: 2024-09-20 | Stop reason: SDUPTHER

## 2024-09-20 RX ORDER — ONDANSETRON 2 MG/ML
4 INJECTION INTRAMUSCULAR; INTRAVENOUS
Status: DISCONTINUED | OUTPATIENT
Start: 2024-09-20 | End: 2024-09-20 | Stop reason: HOSPADM

## 2024-09-20 RX ORDER — KETOROLAC TROMETHAMINE 30 MG/ML
30 INJECTION, SOLUTION INTRAMUSCULAR; INTRAVENOUS
Status: COMPLETED | OUTPATIENT
Start: 2024-09-20 | End: 2024-09-20

## 2024-09-20 RX ORDER — METRONIDAZOLE 500 MG/100ML
500 INJECTION, SOLUTION INTRAVENOUS
Status: COMPLETED | OUTPATIENT
Start: 2024-09-20 | End: 2024-09-20

## 2024-09-20 RX ORDER — ROCURONIUM BROMIDE 10 MG/ML
INJECTION, SOLUTION INTRAVENOUS
Status: DISCONTINUED | OUTPATIENT
Start: 2024-09-20 | End: 2024-09-20 | Stop reason: SDUPTHER

## 2024-09-20 RX ORDER — SODIUM CHLORIDE, SODIUM LACTATE, POTASSIUM CHLORIDE, CALCIUM CHLORIDE 600; 310; 30; 20 MG/100ML; MG/100ML; MG/100ML; MG/100ML
INJECTION, SOLUTION INTRAVENOUS CONTINUOUS
Status: DISCONTINUED | OUTPATIENT
Start: 2024-09-20 | End: 2024-09-20 | Stop reason: HOSPADM

## 2024-09-20 RX ORDER — PROPOFOL 10 MG/ML
INJECTION, EMULSION INTRAVENOUS
Status: DISCONTINUED | OUTPATIENT
Start: 2024-09-20 | End: 2024-09-20 | Stop reason: SDUPTHER

## 2024-09-20 RX ORDER — NALOXONE HYDROCHLORIDE 0.4 MG/ML
INJECTION, SOLUTION INTRAMUSCULAR; INTRAVENOUS; SUBCUTANEOUS PRN
Status: DISCONTINUED | OUTPATIENT
Start: 2024-09-20 | End: 2024-09-20 | Stop reason: HOSPADM

## 2024-09-20 RX ORDER — LIDOCAINE HYDROCHLORIDE 20 MG/ML
INJECTION, SOLUTION EPIDURAL; INFILTRATION; INTRACAUDAL; PERINEURAL
Status: DISCONTINUED | OUTPATIENT
Start: 2024-09-20 | End: 2024-09-20 | Stop reason: SDUPTHER

## 2024-09-20 RX ORDER — BUPIVACAINE HYDROCHLORIDE AND EPINEPHRINE 5; 5 MG/ML; UG/ML
INJECTION, SOLUTION PERINEURAL PRN
Status: DISCONTINUED | OUTPATIENT
Start: 2024-09-20 | End: 2024-09-20 | Stop reason: HOSPADM

## 2024-09-20 RX ADMIN — LIDOCAINE HYDROCHLORIDE 80 MG: 20 INJECTION, SOLUTION EPIDURAL; INFILTRATION; INTRACAUDAL; PERINEURAL at 07:40

## 2024-09-20 RX ADMIN — WATER 2000 MG: 1 INJECTION INTRAMUSCULAR; INTRAVENOUS; SUBCUTANEOUS at 07:47

## 2024-09-20 RX ADMIN — PROPOFOL 150 MCG/KG/MIN: 10 INJECTION, EMULSION INTRAVENOUS at 07:43

## 2024-09-20 RX ADMIN — HYDROMORPHONE HYDROCHLORIDE 0.5 MG: 1 INJECTION, SOLUTION INTRAMUSCULAR; INTRAVENOUS; SUBCUTANEOUS at 10:25

## 2024-09-20 RX ADMIN — DEXAMETHASONE SODIUM PHOSPHATE 4 MG: 4 INJECTION, SOLUTION INTRAMUSCULAR; INTRAVENOUS at 07:47

## 2024-09-20 RX ADMIN — SODIUM CHLORIDE, POTASSIUM CHLORIDE, SODIUM LACTATE AND CALCIUM CHLORIDE: 600; 310; 30; 20 INJECTION, SOLUTION INTRAVENOUS at 07:33

## 2024-09-20 RX ADMIN — ROCURONIUM BROMIDE 50 MG: 10 INJECTION INTRAVENOUS at 07:40

## 2024-09-20 RX ADMIN — HYDROMORPHONE HYDROCHLORIDE 1 MG: 2 INJECTION, SOLUTION INTRAMUSCULAR; INTRAVENOUS; SUBCUTANEOUS at 08:10

## 2024-09-20 RX ADMIN — PROPOFOL 200 MG: 10 INJECTION, EMULSION INTRAVENOUS at 07:40

## 2024-09-20 RX ADMIN — METRONIDAZOLE 500 MG: 500 INJECTION, SOLUTION INTRAVENOUS at 07:47

## 2024-09-20 RX ADMIN — KETOROLAC TROMETHAMINE 30 MG: 30 INJECTION, SOLUTION INTRAMUSCULAR at 10:13

## 2024-09-20 RX ADMIN — MIDAZOLAM HYDROCHLORIDE 2 MG: 1 INJECTION, SOLUTION INTRAMUSCULAR; INTRAVENOUS at 07:28

## 2024-09-20 RX ADMIN — ONDANSETRON HYDROCHLORIDE 4 MG: 2 SOLUTION INTRAMUSCULAR; INTRAVENOUS at 09:23

## 2024-09-20 RX ADMIN — Medication 20 MG: at 07:40

## 2024-09-20 RX ADMIN — SUGAMMADEX 200 MG: 100 INJECTION, SOLUTION INTRAVENOUS at 09:26

## 2024-09-20 RX ADMIN — Medication 20 MG: at 08:10

## 2024-09-20 RX ADMIN — HYDROMORPHONE HYDROCHLORIDE 0.5 MG: 1 INJECTION, SOLUTION INTRAMUSCULAR; INTRAVENOUS; SUBCUTANEOUS at 10:03

## 2024-09-20 RX ADMIN — Medication 10 MG: at 09:23

## 2024-09-20 ASSESSMENT — PAIN DESCRIPTION - ONSET
ONSET: GRADUAL
ONSET: AWAKENED FROM SLEEP
ONSET: AWAKENED FROM SLEEP

## 2024-09-20 ASSESSMENT — PAIN DESCRIPTION - DESCRIPTORS
DESCRIPTORS: SHARP
DESCRIPTORS: SHARP
DESCRIPTORS: CRAMPING

## 2024-09-20 ASSESSMENT — PAIN DESCRIPTION - LOCATION
LOCATION: ABDOMEN

## 2024-09-20 ASSESSMENT — PAIN DESCRIPTION - PAIN TYPE
TYPE: SURGICAL PAIN

## 2024-09-20 ASSESSMENT — PAIN SCALES - GENERAL
PAINLEVEL_OUTOF10: 1
PAINLEVEL_OUTOF10: 6
PAINLEVEL_OUTOF10: 3
PAINLEVEL_OUTOF10: 6

## 2024-09-20 ASSESSMENT — PAIN DESCRIPTION - FREQUENCY
FREQUENCY: CONTINUOUS
FREQUENCY: CONTINUOUS
FREQUENCY: INTERMITTENT

## 2024-09-20 ASSESSMENT — PAIN - FUNCTIONAL ASSESSMENT
PAIN_FUNCTIONAL_ASSESSMENT: PREVENTS OR INTERFERES SOME ACTIVE ACTIVITIES AND ADLS
PAIN_FUNCTIONAL_ASSESSMENT: ACTIVITIES ARE NOT PREVENTED
PAIN_FUNCTIONAL_ASSESSMENT: PREVENTS OR INTERFERES SOME ACTIVE ACTIVITIES AND ADLS

## (undated) DEVICE — Z DISCONTINUED USE 2889526 SHEARS ENDOSCP L36CM DIA5MM ULTRASONIC CRV TIP W/ ADV

## (undated) DEVICE — DRAPE,REIN 53X77,STERILE: Brand: MEDLINE

## (undated) DEVICE — NEEDLE INSUFFLATION 14 GAX120 MM SURGINEEDLE

## (undated) DEVICE — SUTURE V-LOC 180 SZ 0 L9IN ABSRB GRN GS-21 L37MM 1/2 CIR VLOCL0346

## (undated) DEVICE — TROCAR: Brand: KII SLEEVE

## (undated) DEVICE — TROCAR: Brand: KII OPTICAL ACCESS SYSTEM

## (undated) DEVICE — SOLUTION IV 1000ML 0.9% SOD CHL PH 5 INJ USP VIAFLX PLAS

## (undated) DEVICE — SOLUTION IRRIG 1000ML STRL H2O USP PLAS POUR BTL

## (undated) DEVICE — GLOVE SURG SZ 6 THK91MIL LTX FREE SYN POLYISOPRENE ANTI

## (undated) DEVICE — SUTURE MONOCRYL SZ 4-0 L27IN ABSRB UD L24MM PS-1 3/8 CIR PRIM Y935H

## (undated) DEVICE — GLOVE SURG SZ 65 L12IN FNGR THK79MIL GRN LTX FREE

## (undated) DEVICE — GYN LAPAROSCOPY-SFMC: Brand: MEDLINE INDUSTRIES, INC.

## (undated) DEVICE — LIQUIBAND RAPID ADHESIVE 36/CS 0.8ML: Brand: MEDLINE

## (undated) DEVICE — SYSTEM SMK EVAC LAP TBNG FILTER HSNG BENT STYL PNK SEE CLR

## (undated) DEVICE — PAD POSITIONING WNG STD KIT W/BODY STRP LF DISP

## (undated) DEVICE — SOLUTION IRRIG 1000ML 0.9% SOD CHL USP POUR PLAS BTL

## (undated) DEVICE — TUBING FLTR PLUME AWAY EVAC W/ SUCT DEV DISP PUREVIEW